# Patient Record
Sex: FEMALE | Race: WHITE | NOT HISPANIC OR LATINO
[De-identification: names, ages, dates, MRNs, and addresses within clinical notes are randomized per-mention and may not be internally consistent; named-entity substitution may affect disease eponyms.]

---

## 2017-01-30 ENCOUNTER — APPOINTMENT (OUTPATIENT)
Dept: CARDIOLOGY | Facility: CLINIC | Age: 67
End: 2017-01-30

## 2017-04-27 ENCOUNTER — APPOINTMENT (OUTPATIENT)
Dept: OTOLARYNGOLOGY | Facility: CLINIC | Age: 67
End: 2017-04-27

## 2017-07-19 ENCOUNTER — APPOINTMENT (OUTPATIENT)
Dept: CARDIOLOGY | Facility: CLINIC | Age: 67
End: 2017-07-19

## 2017-07-31 ENCOUNTER — EMERGENCY (EMERGENCY)
Facility: HOSPITAL | Age: 67
LOS: 0 days | Discharge: HOME | End: 2017-07-31
Admitting: FAMILY MEDICINE

## 2017-07-31 DIAGNOSIS — R19.7 DIARRHEA, UNSPECIFIED: ICD-10-CM

## 2017-07-31 DIAGNOSIS — E03.9 HYPOTHYROIDISM, UNSPECIFIED: ICD-10-CM

## 2017-07-31 DIAGNOSIS — N18.9 CHRONIC KIDNEY DISEASE, UNSPECIFIED: ICD-10-CM

## 2017-07-31 DIAGNOSIS — I12.9 HYPERTENSIVE CHRONIC KIDNEY DISEASE WITH STAGE 1 THROUGH STAGE 4 CHRONIC KIDNEY DISEASE, OR UNSPECIFIED CHRONIC KIDNEY DISEASE: ICD-10-CM

## 2017-07-31 DIAGNOSIS — R06.02 SHORTNESS OF BREATH: ICD-10-CM

## 2017-07-31 DIAGNOSIS — R53.1 WEAKNESS: ICD-10-CM

## 2017-07-31 DIAGNOSIS — N39.0 URINARY TRACT INFECTION, SITE NOT SPECIFIED: ICD-10-CM

## 2017-07-31 DIAGNOSIS — E11.9 TYPE 2 DIABETES MELLITUS WITHOUT COMPLICATIONS: ICD-10-CM

## 2017-07-31 DIAGNOSIS — Z79.899 OTHER LONG TERM (CURRENT) DRUG THERAPY: ICD-10-CM

## 2017-08-15 ENCOUNTER — APPOINTMENT (OUTPATIENT)
Dept: CARDIOLOGY | Facility: CLINIC | Age: 67
End: 2017-08-15

## 2017-08-15 VITALS — SYSTOLIC BLOOD PRESSURE: 120 MMHG | HEART RATE: 92 BPM | RESPIRATION RATE: 18 BRPM | DIASTOLIC BLOOD PRESSURE: 70 MMHG

## 2017-08-15 VITALS — HEIGHT: 63 IN

## 2017-08-15 DIAGNOSIS — I07.1 RHEUMATIC TRICUSPID INSUFFICIENCY: ICD-10-CM

## 2018-02-14 ENCOUNTER — APPOINTMENT (OUTPATIENT)
Dept: CARDIOLOGY | Facility: CLINIC | Age: 68
End: 2018-02-14

## 2018-02-14 VITALS — HEART RATE: 80 BPM | RESPIRATION RATE: 18 BRPM | SYSTOLIC BLOOD PRESSURE: 118 MMHG | DIASTOLIC BLOOD PRESSURE: 70 MMHG

## 2018-02-14 VITALS — HEIGHT: 63 IN

## 2018-08-20 ENCOUNTER — OUTPATIENT (OUTPATIENT)
Dept: OUTPATIENT SERVICES | Facility: HOSPITAL | Age: 68
LOS: 1 days | Discharge: HOME | End: 2018-08-20

## 2018-08-20 ENCOUNTER — LABORATORY RESULT (OUTPATIENT)
Age: 68
End: 2018-08-20

## 2018-08-20 ENCOUNTER — APPOINTMENT (OUTPATIENT)
Dept: OBGYN | Facility: CLINIC | Age: 68
End: 2018-08-20
Payer: MEDICARE

## 2018-08-20 VITALS — HEIGHT: 63 IN

## 2018-08-20 DIAGNOSIS — M85.80 OTHER SPECIFIED DISORDERS OF BONE DENSITY AND STRUCTURE, UNSPECIFIED SITE: ICD-10-CM

## 2018-08-20 PROCEDURE — 99387 INIT PM E/M NEW PAT 65+ YRS: CPT

## 2018-08-20 PROCEDURE — 81003 URINALYSIS AUTO W/O SCOPE: CPT | Mod: QW

## 2018-08-21 DIAGNOSIS — Z01.419 ENCOUNTER FOR GYNECOLOGICAL EXAMINATION (GENERAL) (ROUTINE) WITHOUT ABNORMAL FINDINGS: ICD-10-CM

## 2018-08-27 ENCOUNTER — APPOINTMENT (OUTPATIENT)
Dept: OTOLARYNGOLOGY | Facility: CLINIC | Age: 68
End: 2018-08-27

## 2018-09-07 LAB
BILIRUB UR QL STRIP: NORMAL
BILIRUB UR QL STRIP: NORMAL
CLARITY UR: CLEAR
GLUCOSE UR-MCNC: NORMAL
GLUCOSE UR-MCNC: NORMAL
HCG UR QL: NORMAL EU/DL
HCG UR QL: NORMAL EU/DL
HGB UR QL STRIP.AUTO: NORMAL
HGB UR QL STRIP.AUTO: NORMAL
KETONES UR-MCNC: NORMAL
KETONES UR-MCNC: NORMAL
LEUKOCYTE ESTERASE UR QL STRIP: 25
LEUKOCYTE ESTERASE UR QL STRIP: 25
NITRITE UR QL STRIP: NORMAL
NITRITE UR QL STRIP: NORMAL
PH UR STRIP: 7
PH UR STRIP: 7
PROT UR STRIP-MCNC: NORMAL
PROT UR STRIP-MCNC: NORMAL
SP GR UR STRIP: 1.01
SP GR UR STRIP: 1.01

## 2018-09-13 ENCOUNTER — APPOINTMENT (OUTPATIENT)
Dept: OBGYN | Facility: CLINIC | Age: 68
End: 2018-09-13
Payer: MEDICARE

## 2018-09-13 PROCEDURE — 76830 TRANSVAGINAL US NON-OB: CPT

## 2018-09-13 PROCEDURE — 76705 ECHO EXAM OF ABDOMEN: CPT

## 2018-09-13 PROCEDURE — 77085 DXA BONE DENSITY AXL VRT FX: CPT

## 2018-09-25 ENCOUNTER — APPOINTMENT (OUTPATIENT)
Dept: CARDIOLOGY | Facility: CLINIC | Age: 68
End: 2018-09-25

## 2018-09-25 VITALS — HEIGHT: 63 IN

## 2018-09-25 VITALS
RESPIRATION RATE: 18 BRPM | SYSTOLIC BLOOD PRESSURE: 136 MMHG | WEIGHT: 245 LBS | HEART RATE: 84 BPM | DIASTOLIC BLOOD PRESSURE: 80 MMHG | BODY MASS INDEX: 43.4 KG/M2

## 2018-10-12 ENCOUNTER — APPOINTMENT (OUTPATIENT)
Dept: OTOLARYNGOLOGY | Facility: CLINIC | Age: 68
End: 2018-10-12
Payer: MEDICARE

## 2018-10-12 VITALS
SYSTOLIC BLOOD PRESSURE: 130 MMHG | HEIGHT: 63 IN | DIASTOLIC BLOOD PRESSURE: 87 MMHG | BODY MASS INDEX: 43.41 KG/M2 | WEIGHT: 245 LBS

## 2018-10-12 DIAGNOSIS — H90.3 SENSORINEURAL HEARING LOSS, BILATERAL: ICD-10-CM

## 2018-10-12 DIAGNOSIS — H61.20 IMPACTED CERUMEN, UNSPECIFIED EAR: ICD-10-CM

## 2018-10-12 PROCEDURE — 92557 COMPREHENSIVE HEARING TEST: CPT

## 2018-10-12 PROCEDURE — 99203 OFFICE O/P NEW LOW 30 MIN: CPT | Mod: 25

## 2018-10-12 PROCEDURE — 92570 ACOUSTIC IMMITANCE TESTING: CPT

## 2019-02-26 ENCOUNTER — APPOINTMENT (OUTPATIENT)
Dept: CARDIOLOGY | Facility: CLINIC | Age: 69
End: 2019-02-26

## 2019-03-04 ENCOUNTER — OUTPATIENT (OUTPATIENT)
Dept: OUTPATIENT SERVICES | Facility: HOSPITAL | Age: 69
LOS: 1 days | Discharge: HOME | End: 2019-03-04

## 2019-03-04 DIAGNOSIS — E78.5 HYPERLIPIDEMIA, UNSPECIFIED: ICD-10-CM

## 2019-03-04 DIAGNOSIS — E13.9 OTHER SPECIFIED DIABETES MELLITUS WITHOUT COMPLICATIONS: ICD-10-CM

## 2019-03-04 DIAGNOSIS — D51.9 VITAMIN B12 DEFICIENCY ANEMIA, UNSPECIFIED: ICD-10-CM

## 2019-03-04 DIAGNOSIS — R74.8 ABNORMAL LEVELS OF OTHER SERUM ENZYMES: ICD-10-CM

## 2019-03-04 DIAGNOSIS — E55.9 VITAMIN D DEFICIENCY, UNSPECIFIED: ICD-10-CM

## 2019-03-04 DIAGNOSIS — R94.6 ABNORMAL RESULTS OF THYROID FUNCTION STUDIES: ICD-10-CM

## 2019-03-04 DIAGNOSIS — R53.82 CHRONIC FATIGUE, UNSPECIFIED: ICD-10-CM

## 2019-03-09 ENCOUNTER — OUTPATIENT (OUTPATIENT)
Dept: OUTPATIENT SERVICES | Facility: HOSPITAL | Age: 69
LOS: 1 days | Discharge: HOME | End: 2019-03-09

## 2019-03-09 DIAGNOSIS — R53.82 CHRONIC FATIGUE, UNSPECIFIED: ICD-10-CM

## 2019-04-23 ENCOUNTER — APPOINTMENT (OUTPATIENT)
Dept: CARDIOLOGY | Facility: CLINIC | Age: 69
End: 2019-04-23
Payer: MEDICARE

## 2019-04-23 VITALS — HEIGHT: 63 IN

## 2019-04-23 VITALS — SYSTOLIC BLOOD PRESSURE: 130 MMHG | HEART RATE: 84 BPM | DIASTOLIC BLOOD PRESSURE: 80 MMHG | RESPIRATION RATE: 18 BRPM

## 2019-04-23 PROCEDURE — 99214 OFFICE O/P EST MOD 30 MIN: CPT

## 2019-04-23 PROCEDURE — 93000 ELECTROCARDIOGRAM COMPLETE: CPT

## 2019-04-23 NOTE — PHYSICAL EXAM
[Normal Appearance] : normal appearance [General Appearance - Well Developed] : well developed [Well Groomed] : well groomed [General Appearance - Well Nourished] : well nourished [No Deformities] : no deformities [Normal Conjunctiva] : the conjunctiva exhibited no abnormalities [General Appearance - In No Acute Distress] : no acute distress [Eyelids - No Xanthelasma] : the eyelids demonstrated no xanthelasmas [Normal Oral Mucosa] : normal oral mucosa [No Oral Pallor] : no oral pallor [No Oral Cyanosis] : no oral cyanosis [Normal Jugular Venous V Waves Present] : normal jugular venous V waves present [Normal Jugular Venous A Waves Present] : normal jugular venous A waves present [No Jugular Venous Schumacher A Waves] : no jugular venous schumacher A waves [Respiration, Rhythm And Depth] : normal respiratory rhythm and effort [Exaggerated Use Of Accessory Muscles For Inspiration] : no accessory muscle use [Auscultation Breath Sounds / Voice Sounds] : lungs were clear to auscultation bilaterally [Heart Rate And Rhythm] : heart rate and rhythm were normal [Heart Sounds] : normal S1 and S2 [Systolic grade ___/6] : A grade [unfilled]/6 systolic murmur was heard. [Bowel Sounds] : normal bowel sounds [Abdomen Soft] : soft [Abdomen Tenderness] : non-tender [Abdomen Mass (___ Cm)] : no abdominal mass palpated [FreeTextEntry1] : walks with cane [Nail Clubbing] : no clubbing of the fingernails [Petechial Hemorrhages (___cm)] : no petechial hemorrhages [Cyanosis, Localized] : no localized cyanosis [Skin Color & Pigmentation] : normal skin color and pigmentation [] : no rash [No Venous Stasis] : no venous stasis [Skin Lesions] : no skin lesions [No Skin Ulcers] : no skin ulcer [No Xanthoma] : no  xanthoma was observed [Oriented To Time, Place, And Person] : oriented to person, place, and time

## 2019-10-02 ENCOUNTER — OUTPATIENT (OUTPATIENT)
Dept: OUTPATIENT SERVICES | Facility: HOSPITAL | Age: 69
LOS: 1 days | Discharge: HOME | End: 2019-10-02

## 2019-10-02 DIAGNOSIS — E78.5 HYPERLIPIDEMIA, UNSPECIFIED: ICD-10-CM

## 2019-10-02 DIAGNOSIS — E13.9 OTHER SPECIFIED DIABETES MELLITUS WITHOUT COMPLICATIONS: ICD-10-CM

## 2019-10-02 DIAGNOSIS — R53.82 CHRONIC FATIGUE, UNSPECIFIED: ICD-10-CM

## 2019-10-28 ENCOUNTER — OTHER (OUTPATIENT)
Age: 69
End: 2019-10-28

## 2019-10-28 ENCOUNTER — APPOINTMENT (OUTPATIENT)
Dept: CARDIOLOGY | Facility: CLINIC | Age: 69
End: 2019-10-28
Payer: MEDICARE

## 2019-10-28 VITALS — HEART RATE: 84 BPM | SYSTOLIC BLOOD PRESSURE: 130 MMHG | RESPIRATION RATE: 18 BRPM | DIASTOLIC BLOOD PRESSURE: 80 MMHG

## 2019-10-28 VITALS — HEIGHT: 63 IN

## 2019-10-28 PROCEDURE — 99214 OFFICE O/P EST MOD 30 MIN: CPT

## 2019-10-28 PROCEDURE — 93000 ELECTROCARDIOGRAM COMPLETE: CPT

## 2019-10-28 NOTE — PHYSICAL EXAM
[General Appearance - Well Developed] : well developed [Normal Appearance] : normal appearance [Well Groomed] : well groomed [General Appearance - Well Nourished] : well nourished [No Deformities] : no deformities [General Appearance - In No Acute Distress] : no acute distress [Normal Conjunctiva] : the conjunctiva exhibited no abnormalities [Eyelids - No Xanthelasma] : the eyelids demonstrated no xanthelasmas [Normal Oral Mucosa] : normal oral mucosa [No Oral Pallor] : no oral pallor [No Oral Cyanosis] : no oral cyanosis [Normal Jugular Venous A Waves Present] : normal jugular venous A waves present [Normal Jugular Venous V Waves Present] : normal jugular venous V waves present [No Jugular Venous Schumacher A Waves] : no jugular venous schumacher A waves [Heart Rate And Rhythm] : heart rate and rhythm were normal [Heart Sounds] : normal S1 and S2 [Systolic grade ___/6] : A grade [unfilled]/6 systolic murmur was heard. [Respiration, Rhythm And Depth] : normal respiratory rhythm and effort [Exaggerated Use Of Accessory Muscles For Inspiration] : no accessory muscle use [Auscultation Breath Sounds / Voice Sounds] : lungs were clear to auscultation bilaterally [Bowel Sounds] : normal bowel sounds [Abdomen Soft] : soft [Abdomen Tenderness] : non-tender [Abdomen Mass (___ Cm)] : no abdominal mass palpated [FreeTextEntry1] : walks with cane [Nail Clubbing] : no clubbing of the fingernails [Cyanosis, Localized] : no localized cyanosis [Petechial Hemorrhages (___cm)] : no petechial hemorrhages [Skin Color & Pigmentation] : normal skin color and pigmentation [] : no rash [No Venous Stasis] : no venous stasis [Skin Lesions] : no skin lesions [No Skin Ulcers] : no skin ulcer [No Xanthoma] : no  xanthoma was observed [Oriented To Time, Place, And Person] : oriented to person, place, and time

## 2020-04-21 ENCOUNTER — APPOINTMENT (OUTPATIENT)
Dept: CARDIOLOGY | Facility: CLINIC | Age: 70
End: 2020-04-21

## 2020-04-24 ENCOUNTER — APPOINTMENT (OUTPATIENT)
Dept: CARDIOLOGY | Facility: CLINIC | Age: 70
End: 2020-04-24

## 2021-07-25 ENCOUNTER — LABORATORY RESULT (OUTPATIENT)
Age: 71
End: 2021-07-25

## 2021-07-26 ENCOUNTER — NON-APPOINTMENT (OUTPATIENT)
Age: 71
End: 2021-07-26

## 2021-07-26 ENCOUNTER — APPOINTMENT (OUTPATIENT)
Dept: OBGYN | Facility: CLINIC | Age: 71
End: 2021-07-26
Payer: MEDICARE

## 2021-07-26 VITALS — HEIGHT: 62 IN | TEMPERATURE: 98 F

## 2021-07-26 DIAGNOSIS — M81.0 AGE-RELATED OSTEOPOROSIS W/OUT CURRENT PATHOLOGICAL FRACTURE: ICD-10-CM

## 2021-07-26 DIAGNOSIS — Z78.0 ASYMPTOMATIC MENOPAUSAL STATE: ICD-10-CM

## 2021-07-26 DIAGNOSIS — Z01.419 ENCOUNTER FOR GYNECOLOGICAL EXAMINATION (GENERAL) (ROUTINE) W/OUT ABNORMAL FINDINGS: ICD-10-CM

## 2021-07-26 DIAGNOSIS — N39.3 STRESS INCONTINENCE (FEMALE) (MALE): ICD-10-CM

## 2021-07-26 PROCEDURE — 77085 DXA BONE DENSITY AXL VRT FX: CPT

## 2021-07-26 PROCEDURE — 99397 PER PM REEVAL EST PAT 65+ YR: CPT

## 2021-07-26 PROCEDURE — 99072 ADDL SUPL MATRL&STAF TM PHE: CPT

## 2021-07-30 PROBLEM — N39.3 URINARY, INCONTINENCE, STRESS FEMALE: Status: ACTIVE | Noted: 2021-07-30

## 2021-07-30 PROBLEM — Z78.0 MENOPAUSE: Status: ACTIVE | Noted: 2018-08-20

## 2021-07-30 PROBLEM — M81.0 OSTEOPOROSIS, POSTMENOPAUSAL: Status: ACTIVE | Noted: 2021-07-30

## 2021-07-30 PROBLEM — Z01.419 WELL WOMAN EXAM WITH ROUTINE GYNECOLOGICAL EXAM: Status: ACTIVE | Noted: 2018-08-20

## 2021-08-06 ENCOUNTER — APPOINTMENT (OUTPATIENT)
Dept: OTOLARYNGOLOGY | Facility: CLINIC | Age: 71
End: 2021-08-06

## 2021-08-12 ENCOUNTER — NON-APPOINTMENT (OUTPATIENT)
Age: 71
End: 2021-08-12

## 2021-08-12 ENCOUNTER — APPOINTMENT (OUTPATIENT)
Dept: OBGYN | Facility: CLINIC | Age: 71
End: 2021-08-12

## 2021-09-13 ENCOUNTER — APPOINTMENT (OUTPATIENT)
Dept: CARDIOLOGY | Facility: CLINIC | Age: 71
End: 2021-09-13
Payer: MEDICARE

## 2021-09-13 PROCEDURE — 93306 TTE W/DOPPLER COMPLETE: CPT

## 2021-09-20 ENCOUNTER — APPOINTMENT (OUTPATIENT)
Dept: CARDIOLOGY | Facility: CLINIC | Age: 71
End: 2021-09-20
Payer: MEDICARE

## 2021-09-20 VITALS — SYSTOLIC BLOOD PRESSURE: 130 MMHG | DIASTOLIC BLOOD PRESSURE: 80 MMHG | HEART RATE: 84 BPM | RESPIRATION RATE: 18 BRPM

## 2021-09-20 PROCEDURE — 99214 OFFICE O/P EST MOD 30 MIN: CPT

## 2021-09-20 PROCEDURE — 93000 ELECTROCARDIOGRAM COMPLETE: CPT

## 2021-09-20 RX ORDER — ACETAMINOPHEN AND CODEINE 300; 30 MG/1; MG/1
300-30 TABLET ORAL
Qty: 12 | Refills: 0 | Status: ACTIVE | COMMUNITY
Start: 2021-09-09

## 2021-09-20 RX ORDER — ROSUVASTATIN CALCIUM 10 MG/1
10 TABLET, FILM COATED ORAL DAILY
Qty: 30 | Refills: 0 | Status: DISCONTINUED | COMMUNITY
Start: 2019-04-30 | End: 2021-09-20

## 2021-09-20 RX ORDER — ROSUVASTATIN CALCIUM 20 MG/1
20 TABLET, FILM COATED ORAL
Qty: 90 | Refills: 0 | Status: ACTIVE | COMMUNITY
Start: 2020-12-29

## 2021-09-20 NOTE — PHYSICAL EXAM
[General Appearance - Well Developed] : well developed [Normal Appearance] : normal appearance [Well Groomed] : well groomed [No Deformities] : no deformities [General Appearance - Well Nourished] : well nourished [General Appearance - In No Acute Distress] : no acute distress [Normal Conjunctiva] : the conjunctiva exhibited no abnormalities [Eyelids - No Xanthelasma] : the eyelids demonstrated no xanthelasmas [Normal Oral Mucosa] : normal oral mucosa [No Oral Pallor] : no oral pallor [No Oral Cyanosis] : no oral cyanosis [Normal Jugular Venous A Waves Present] : normal jugular venous A waves present [Normal Jugular Venous V Waves Present] : normal jugular venous V waves present [No Jugular Venous Schumacher A Waves] : no jugular venous schmuacher A waves [Heart Rate And Rhythm] : heart rate and rhythm were normal [Heart Sounds] : normal S1 and S2 [Systolic grade ___/6] : A grade [unfilled]/6 systolic murmur was heard. [Respiration, Rhythm And Depth] : normal respiratory rhythm and effort [Exaggerated Use Of Accessory Muscles For Inspiration] : no accessory muscle use [Auscultation Breath Sounds / Voice Sounds] : lungs were clear to auscultation bilaterally [Bowel Sounds] : normal bowel sounds [Abdomen Soft] : soft [Abdomen Tenderness] : non-tender [Abdomen Mass (___ Cm)] : no abdominal mass palpated [FreeTextEntry1] : walks with cane [Nail Clubbing] : no clubbing of the fingernails [Cyanosis, Localized] : no localized cyanosis [Petechial Hemorrhages (___cm)] : no petechial hemorrhages [Skin Color & Pigmentation] : normal skin color and pigmentation [] : no rash [No Venous Stasis] : no venous stasis [Skin Lesions] : no skin lesions [No Skin Ulcers] : no skin ulcer [No Xanthoma] : no  xanthoma was observed [Oriented To Time, Place, And Person] : oriented to person, place, and time

## 2022-05-03 ENCOUNTER — APPOINTMENT (OUTPATIENT)
Dept: CARDIOLOGY | Facility: CLINIC | Age: 72
End: 2022-05-03
Payer: MEDICARE

## 2022-05-03 VITALS — DIASTOLIC BLOOD PRESSURE: 80 MMHG | RESPIRATION RATE: 18 BRPM | SYSTOLIC BLOOD PRESSURE: 120 MMHG | HEART RATE: 76 BPM

## 2022-05-03 PROCEDURE — 93000 ELECTROCARDIOGRAM COMPLETE: CPT

## 2022-05-03 PROCEDURE — 99214 OFFICE O/P EST MOD 30 MIN: CPT

## 2022-05-04 ENCOUNTER — APPOINTMENT (OUTPATIENT)
Age: 72
End: 2022-05-04
Payer: MEDICARE

## 2022-05-17 ENCOUNTER — APPOINTMENT (OUTPATIENT)
Age: 72
End: 2022-05-17
Payer: MEDICARE

## 2022-05-17 VITALS
OXYGEN SATURATION: 93 % | HEIGHT: 62 IN | DIASTOLIC BLOOD PRESSURE: 72 MMHG | RESPIRATION RATE: 14 BRPM | SYSTOLIC BLOOD PRESSURE: 114 MMHG | HEART RATE: 88 BPM

## 2022-05-17 PROCEDURE — 99214 OFFICE O/P EST MOD 30 MIN: CPT

## 2022-05-17 NOTE — REASON FOR VISIT
[Follow-Up] : a follow-up visit [Sleep Apnea] : sleep apnea [Obesity] : obesity [TextBox_44] : The patient underwent sleep testing in December 2019.  The test showed significant obstructive sleep apnea syndrome.  CPAP machine was ordered.  However the patient never got the machine.  This was right around around the start of the COVID pandemic.  Since that time she has been lost to follow-up\par \par The patient returns today with signs and symptoms of ongoing disease.  She wishes to acquire the CPAP at this.  I am the test may be too old and that Medicare may not approve.  She does not want to repeat any testing.  I told her I would try to put it through based on the old test hoping that Medicare will approve the equipment.  If they refuse stating that the test is too low she will need to have at least a home sleep test to redocument the presence of sleep apnea.

## 2022-05-17 NOTE — ASSESSMENT
[FreeTextEntry1] : Assessment:\par CORTEZ\par Obesity\par \par PLAN:\par The patient  needs the PAP device .\par New supplies ordered \par Weight loss discussed\par I stressed the need maintain compliance  with the PAP device.\par The patient is not to use an Ozone or UV sterilizer. \par F/U in 3months \par if the insurance refuses she will need to have a home sleep test to review document the disease.\par \par \par

## 2022-11-08 ENCOUNTER — APPOINTMENT (OUTPATIENT)
Dept: CARDIOLOGY | Facility: CLINIC | Age: 72
End: 2022-11-08

## 2022-11-08 VITALS — HEART RATE: 85 BPM | HEIGHT: 62 IN

## 2022-11-08 VITALS — SYSTOLIC BLOOD PRESSURE: 118 MMHG | RESPIRATION RATE: 18 BRPM | DIASTOLIC BLOOD PRESSURE: 80 MMHG

## 2022-11-08 PROCEDURE — 93000 ELECTROCARDIOGRAM COMPLETE: CPT

## 2022-11-08 PROCEDURE — 99214 OFFICE O/P EST MOD 30 MIN: CPT | Mod: 25

## 2022-11-08 RX ORDER — INSULIN LISPRO 100 [IU]/ML
(75-25) 100 INJECTION, SUSPENSION SUBCUTANEOUS
Qty: 210 | Refills: 0 | Status: ACTIVE | COMMUNITY
Start: 2022-07-11

## 2022-12-20 ENCOUNTER — NON-APPOINTMENT (OUTPATIENT)
Age: 72
End: 2022-12-20

## 2022-12-21 ENCOUNTER — EMERGENCY (EMERGENCY)
Facility: HOSPITAL | Age: 72
LOS: 0 days | Discharge: HOME | End: 2022-12-21
Attending: EMERGENCY MEDICINE | Admitting: EMERGENCY MEDICINE

## 2022-12-21 VITALS
OXYGEN SATURATION: 99 % | HEART RATE: 92 BPM | DIASTOLIC BLOOD PRESSURE: 56 MMHG | RESPIRATION RATE: 20 BRPM | TEMPERATURE: 98 F | SYSTOLIC BLOOD PRESSURE: 115 MMHG

## 2022-12-21 VITALS
SYSTOLIC BLOOD PRESSURE: 147 MMHG | OXYGEN SATURATION: 100 % | RESPIRATION RATE: 18 BRPM | HEART RATE: 78 BPM | TEMPERATURE: 98 F | WEIGHT: 225.09 LBS | DIASTOLIC BLOOD PRESSURE: 50 MMHG

## 2022-12-21 DIAGNOSIS — R03.1 NONSPECIFIC LOW BLOOD-PRESSURE READING: ICD-10-CM

## 2022-12-21 DIAGNOSIS — R19.7 DIARRHEA, UNSPECIFIED: ICD-10-CM

## 2022-12-21 LAB
ALBUMIN SERPL ELPH-MCNC: 3.9 G/DL — SIGNIFICANT CHANGE UP (ref 3.5–5.2)
ALP SERPL-CCNC: 142 U/L — HIGH (ref 30–115)
ALT FLD-CCNC: 28 U/L — SIGNIFICANT CHANGE UP (ref 0–41)
ANION GAP SERPL CALC-SCNC: 15 MMOL/L — HIGH (ref 7–14)
AST SERPL-CCNC: 34 U/L — SIGNIFICANT CHANGE UP (ref 0–41)
BILIRUB SERPL-MCNC: 0.5 MG/DL — SIGNIFICANT CHANGE UP (ref 0.2–1.2)
BUN SERPL-MCNC: 48 MG/DL — HIGH (ref 10–20)
CALCIUM SERPL-MCNC: 9.1 MG/DL — SIGNIFICANT CHANGE UP (ref 8.4–10.5)
CHLORIDE SERPL-SCNC: 99 MMOL/L — SIGNIFICANT CHANGE UP (ref 98–110)
CO2 SERPL-SCNC: 22 MMOL/L — SIGNIFICANT CHANGE UP (ref 17–32)
CREAT SERPL-MCNC: 3.4 MG/DL — HIGH (ref 0.7–1.5)
EGFR: 14 ML/MIN/1.73M2 — LOW
GLUCOSE SERPL-MCNC: 140 MG/DL — HIGH (ref 70–99)
HCT VFR BLD CALC: 32 % — LOW (ref 37–47)
HGB BLD-MCNC: 11.5 G/DL — LOW (ref 12–16)
LIDOCAIN IGE QN: 30 U/L — SIGNIFICANT CHANGE UP (ref 7–60)
MCHC RBC-ENTMCNC: 35.9 G/DL — SIGNIFICANT CHANGE UP (ref 32–37)
MCHC RBC-ENTMCNC: 38.2 PG — HIGH (ref 27–31)
MCV RBC AUTO: 106.3 FL — HIGH (ref 81–99)
NRBC # BLD: 0 /100 WBCS — SIGNIFICANT CHANGE UP (ref 0–0)
PLATELET # BLD AUTO: 195 K/UL — SIGNIFICANT CHANGE UP (ref 130–400)
POTASSIUM SERPL-MCNC: 4.2 MMOL/L — SIGNIFICANT CHANGE UP (ref 3.5–5)
POTASSIUM SERPL-SCNC: 4.2 MMOL/L — SIGNIFICANT CHANGE UP (ref 3.5–5)
PROT SERPL-MCNC: 6.3 G/DL — SIGNIFICANT CHANGE UP (ref 6–8)
RBC # BLD: 3.01 M/UL — LOW (ref 4.2–5.4)
RBC # FLD: 13.2 % — SIGNIFICANT CHANGE UP (ref 11.5–14.5)
SODIUM SERPL-SCNC: 136 MMOL/L — SIGNIFICANT CHANGE UP (ref 135–146)
WBC # BLD: 5.81 K/UL — SIGNIFICANT CHANGE UP (ref 4.8–10.8)
WBC # FLD AUTO: 5.81 K/UL — SIGNIFICANT CHANGE UP (ref 4.8–10.8)

## 2022-12-21 PROCEDURE — 99284 EMERGENCY DEPT VISIT MOD MDM: CPT

## 2022-12-21 RX ORDER — SODIUM CHLORIDE 9 MG/ML
1000 INJECTION INTRAMUSCULAR; INTRAVENOUS; SUBCUTANEOUS ONCE
Refills: 0 | Status: COMPLETED | OUTPATIENT
Start: 2022-12-21 | End: 2022-12-21

## 2022-12-21 RX ADMIN — SODIUM CHLORIDE 2000 MILLILITER(S): 9 INJECTION INTRAMUSCULAR; INTRAVENOUS; SUBCUTANEOUS at 20:57

## 2022-12-21 NOTE — ED PROVIDER NOTE - PATIENT PORTAL LINK FT
You can access the FollowMyHealth Patient Portal offered by [facility] by registering at the following website: [email or web address]. By joining [facility]’s FollowMyHealth portal, you will also be able to view your health information using other applications (apps) compatible with our system.

## 2022-12-21 NOTE — ED PROVIDER NOTE - OBJECTIVE STATEMENT
72-year-old female to ED with low blood pressure.  Patient has few days of diarrhea was seen at urgent care today noted to have blood pressure of 70 systolic and sent to ED for evaluation.  Patient given IV fluids symptoms much improved on route to ED and on arrival blood pressure 147/50.  No other complaints no headache or back pain.  No fevers no sick contacts no cough congestion URI.  Questionable flu exposure

## 2022-12-21 NOTE — ED ADULT TRIAGE NOTE - CHIEF COMPLAINT QUOTE
Patient complaining of weakness, nausea, and diarrhea x1 week- Patient was at  and was found to be hypotensive.(70/40)

## 2022-12-21 NOTE — ED PEDIATRIC NURSE NOTE - CHIEF COMPLAINT QUOTE
Pt came from Urgent care Center due to diarrhea. Pt c/o watery stools. [place] [facility] [place]

## 2022-12-21 NOTE — ED PROVIDER NOTE - NSFOLLOWUPINSTRUCTIONS_ED_ALL_ED_FT
Diarrhea    Diarrhea is frequent loose and watery bowel movements that has many causes. Diarrhea can make you feel weak and cause you to become dehydrated. Diarrhea typically lasts 3-5 days. However, it can last longer if it is a sign of something more serious. Drink clear fluids to prevent dehydration. Eat bland, easy-to-digest foods as tolerated.     SEEK IMMEDIATE MEDICAL CARE IF YOU HAVE THE FOLLOWING SYMPTOMS: fever, lightheadedness/dizziness, chest pain, black or bloody stools, shortness of breath, severe abdominal or back pain, or any signs of dehydration.

## 2022-12-21 NOTE — ED PROVIDER NOTE - NS ED ROS FT
ROS  Constitutional:  See HPI.  Eyes:  No visual changes, eye pain or discharge.  ENMT:  No hearing changes, pain, discharge or infections. No neck pain or stiffness.  Cardiac:  No chest pain, SOB or edema. No chest pain with exertion.  Respiratory:  No cough or respiratory distress. No hemoptysis.  GI:    diarrhea    :  No dysuria, frequency or burning.  MS:  No myalgia, muscle weakness, joint pain or back pain.  Neuro:  No focal neurological complaints.  Skin:  No skin rash.  Except as documented in the HPI,  all other systems are negative.

## 2022-12-21 NOTE — ED PROVIDER NOTE - CLINICAL SUMMARY MEDICAL DECISION MAKING FREE TEXT BOX
plant to check labs, give IVF and reassess plant to check labs, give IVF and reassess      Patient feeling much much better.  No longer dizziness or weakness.  Discussed further plan she is adamant about going home and prefers to sleep in her own bed.  Will follow-up with Dr. Kinsey for her kidney function.  Baseline creatinine 3.2.

## 2023-02-08 ENCOUNTER — NON-APPOINTMENT (OUTPATIENT)
Age: 73
End: 2023-02-08

## 2023-02-10 ENCOUNTER — APPOINTMENT (OUTPATIENT)
Dept: ORTHOPEDIC SURGERY | Facility: CLINIC | Age: 73
End: 2023-02-10
Payer: MEDICARE

## 2023-02-10 VITALS — HEIGHT: 63 IN | WEIGHT: 223 LBS | BODY MASS INDEX: 39.51 KG/M2

## 2023-02-10 DIAGNOSIS — S61.218A LACERATION W/OUT FOREIGN BODY OF OTHER FINGER W/OUT DAMAGE TO NAIL, INITIAL ENCOUNTER: ICD-10-CM

## 2023-02-10 PROCEDURE — 99202 OFFICE O/P NEW SF 15 MIN: CPT | Mod: 25

## 2023-02-10 PROCEDURE — 20550 NJX 1 TENDON SHEATH/LIGAMENT: CPT | Mod: F3

## 2023-02-10 NOTE — ASSESSMENT
[FreeTextEntry1] : Patient is a right middle finger laceration.  L plates not involved.  She will do Vaseline dressing changes.  I instructed her how to do this did watch a video and see a handout regarding the same.\par \par For her left ring finger she was here for cortisone injection to left ring finger A1 pulley area.  I read this well.  She make arrangements to see us back in a month for potential second injection.

## 2023-02-10 NOTE — HISTORY OF PRESENT ILLNESS
[de-identified] : 72-year-old female comes in the office for evaluation as her left hand bothers her with locking pain discomfort in the left ring finger she also sustained a laceration with a mandolin slicer to her right middle finger she comes in without bandage as well.

## 2023-02-10 NOTE — PHYSICAL EXAM
[de-identified] : The bandages removed.  She did lose skin at the tip of the distal phalanx.  There is good range of motion to the fingers no redness or drainage.  The nail plate is intact.\par \par She has tenderness to palpation of the left ring finger A1 pulley area there is triggering present there is normal sensation normal cap refill erythema ecchymoses or abrasions present.  No Dupuytren's

## 2023-02-13 ENCOUNTER — APPOINTMENT (OUTPATIENT)
Dept: PODIATRY | Facility: CLINIC | Age: 73
End: 2023-02-13
Payer: MEDICARE

## 2023-02-13 ENCOUNTER — RESULT REVIEW (OUTPATIENT)
Age: 73
End: 2023-02-13

## 2023-02-13 VITALS
SYSTOLIC BLOOD PRESSURE: 160 MMHG | HEIGHT: 63 IN | BODY MASS INDEX: 39.87 KG/M2 | HEART RATE: 95 BPM | WEIGHT: 225 LBS | DIASTOLIC BLOOD PRESSURE: 80 MMHG | OXYGEN SATURATION: 97 % | TEMPERATURE: 97 F

## 2023-02-13 PROCEDURE — 99203 OFFICE O/P NEW LOW 30 MIN: CPT

## 2023-02-22 ENCOUNTER — OUTPATIENT (OUTPATIENT)
Dept: OUTPATIENT SERVICES | Facility: HOSPITAL | Age: 73
LOS: 1 days | End: 2023-02-22
Payer: MEDICARE

## 2023-02-22 DIAGNOSIS — E11.610 TYPE 2 DIABETES MELLITUS WITH DIABETIC NEUROPATHIC ARTHROPATHY: ICD-10-CM

## 2023-02-22 PROCEDURE — 73630 X-RAY EXAM OF FOOT: CPT | Mod: 50

## 2023-02-22 PROCEDURE — 73630 X-RAY EXAM OF FOOT: CPT | Mod: 26,50

## 2023-02-23 DIAGNOSIS — E11.610 TYPE 2 DIABETES MELLITUS WITH DIABETIC NEUROPATHIC ARTHROPATHY: ICD-10-CM

## 2023-02-27 ENCOUNTER — APPOINTMENT (OUTPATIENT)
Dept: PODIATRY | Facility: CLINIC | Age: 73
End: 2023-02-27
Payer: MEDICARE

## 2023-02-27 PROCEDURE — 99213 OFFICE O/P EST LOW 20 MIN: CPT

## 2023-03-01 NOTE — PROCEDURE
[FreeTextEntry1] : Discussed x-rays in detail patient made aware of condition the condition is not Charcot foot deformity to the as per radiologist right foot appears to be degenerative osteoarthritis discussed in detail with patient in total will make appointment with orthotist in clinic

## 2023-03-01 NOTE — PHYSICAL EXAM
[General Appearance - Alert] : alert [General Appearance - In No Acute Distress] : in no acute distress [Ankle Swelling (On Exam)] : not present [Varicose Veins Of Lower Extremities] : not present [Delayed in the Right Toes] : capillary refills normal in right toes [Delayed in the Left Toes] : capillary refills normal in the left toes [2+] : left foot dorsalis pedis 2+ [No Joint Swelling] : no joint swelling [Normal Foot/Ankle] : Both lower extremities were exposed and visualized. Standing exam demonstrates normal foot posture and alignment. Hindfoot exam shows no hindfoot valgus or varus [Skin Color & Pigmentation] : normal skin color and pigmentation [Skin Turgor] : normal skin turgor [] : no rash [Skin Lesions] : no skin lesions [Foot Ulcer] : no foot ulcer [Skin Induration] : no skin induration [Vibration Dec.] : diminished vibratory sensation at the level of the toes [Position Sense Dec.] : diminished position sense at the level of the toes [Diminished Throughout Right Foot] : diminished sensation with monofilament testing throughout right foot [Diminished Throughout Left Foot] : diminished sensation with monofilament testing throughout left foot [Oriented To Time, Place, And Person] : oriented to person, place, and time [Impaired Insight] : insight and judgment were intact [Affect] : the affect was normal

## 2023-03-01 NOTE — HISTORY OF PRESENT ILLNESS
[FreeTextEntry1] : This is a patient that is seen for podiatric care\par \par Patient complains of bilateral collapsed arches and flatfeet\par \par Patient is diabetic and has a history of neuropathy\par \par Denies nausea vomiting fevers chills no shortness of breath

## 2023-03-01 NOTE — PROCEDURE
[FreeTextEntry1] : Patient evaluated history reviewed\par Discussed possible Charcot foot right being worse than the left\par Will obtain x-rays to evaluate possible collapsed right midfoot\par Discussed osteoarthritis and Charcot in detail\par Patient will follow-up in 2 weeks\par

## 2023-03-10 ENCOUNTER — APPOINTMENT (OUTPATIENT)
Dept: ORTHOPEDIC SURGERY | Facility: CLINIC | Age: 73
End: 2023-03-10
Payer: MEDICARE

## 2023-03-10 VITALS — BODY MASS INDEX: 39.87 KG/M2 | WEIGHT: 225 LBS | HEIGHT: 63 IN

## 2023-03-10 PROCEDURE — 20550 NJX 1 TENDON SHEATH/LIGAMENT: CPT

## 2023-03-10 NOTE — ASSESSMENT
[FreeTextEntry1] : Patient is a left ring finger trigger digit patient will receive her second cortisone injection today injection does not work she will consider surgical intervention for trigger finger release under local anesthesia.

## 2023-03-10 NOTE — PHYSICAL EXAM
[de-identified] : Very minimal wound on the right middle finger there is triggering and tenderness to palpation the A1 pulley left ring finger there is normal sensation normal cap refill there is no erythema ecchymoses or abrasions no Dupuytren's contracture.

## 2023-03-10 NOTE — HISTORY OF PRESENT ILLNESS
[de-identified] : 72-year-old female had a wound to the right middle finger which is healing nicely left ring finger had a cortisone injection for for trigger digit that did well also with improvement in symptoms of locking.

## 2023-03-16 ENCOUNTER — APPOINTMENT (OUTPATIENT)
Dept: OBGYN | Facility: CLINIC | Age: 73
End: 2023-03-16

## 2023-04-04 ENCOUNTER — APPOINTMENT (OUTPATIENT)
Dept: PODIATRY | Facility: CLINIC | Age: 73
End: 2023-04-04
Payer: MEDICARE

## 2023-04-04 ENCOUNTER — OUTPATIENT (OUTPATIENT)
Dept: OUTPATIENT SERVICES | Facility: HOSPITAL | Age: 73
LOS: 1 days | End: 2023-04-04
Payer: MEDICARE

## 2023-04-04 DIAGNOSIS — Z00.00 ENCOUNTER FOR GENERAL ADULT MEDICAL EXAMINATION WITHOUT ABNORMAL FINDINGS: ICD-10-CM

## 2023-04-04 PROCEDURE — 99213 OFFICE O/P EST LOW 20 MIN: CPT

## 2023-04-05 NOTE — PROCEDURE
[] : A mold was applied. [FreeTextEntry1] : pt with bilateral flat feet. initiated fabrication of orthotics with pink and white plastizote. pt will be getting new shoes will return in several weeks with shoes,will complete fabrication of inserts at that time.

## 2023-04-14 DIAGNOSIS — E11.610 TYPE 2 DIABETES MELLITUS WITH DIABETIC NEUROPATHIC ARTHROPATHY: ICD-10-CM

## 2023-04-14 DIAGNOSIS — M79.672 PAIN IN LEFT FOOT: ICD-10-CM

## 2023-04-14 DIAGNOSIS — R26.2 DIFFICULTY IN WALKING, NOT ELSEWHERE CLASSIFIED: ICD-10-CM

## 2023-04-14 DIAGNOSIS — M79.671 PAIN IN RIGHT FOOT: ICD-10-CM

## 2023-04-24 ENCOUNTER — APPOINTMENT (OUTPATIENT)
Dept: PODIATRY | Facility: CLINIC | Age: 73
End: 2023-04-24
Payer: MEDICARE

## 2023-04-24 ENCOUNTER — OUTPATIENT (OUTPATIENT)
Dept: OUTPATIENT SERVICES | Facility: HOSPITAL | Age: 73
LOS: 1 days | End: 2023-04-24
Payer: MEDICARE

## 2023-04-24 DIAGNOSIS — M79.672 PAIN IN LEFT FOOT: ICD-10-CM

## 2023-04-24 DIAGNOSIS — Z00.00 ENCOUNTER FOR GENERAL ADULT MEDICAL EXAMINATION WITHOUT ABNORMAL FINDINGS: ICD-10-CM

## 2023-04-24 DIAGNOSIS — M79.671 PAIN IN RIGHT FOOT: ICD-10-CM

## 2023-04-24 DIAGNOSIS — E11.610 TYPE 2 DIABETES MELLITUS WITH DIABETIC NEUROPATHIC ARTHROPATHY: ICD-10-CM

## 2023-04-24 PROCEDURE — 99213 OFFICE O/P EST LOW 20 MIN: CPT

## 2023-05-03 NOTE — PROCEDURE
[FreeTextEntry1] : pt with new shoes. began fabrication of orthotics. with orthotics shoes proved to be too small. pt will exchange for 1/2 size bigger and return here in 3 weeks for completion of orthotics.

## 2023-05-23 NOTE — ASU PATIENT PROFILE, ADULT - FALL HARM RISK - UNIVERSAL INTERVENTIONS
Bed in lowest position, wheels locked, appropriate side rails in place/Call bell, personal items and telephone in reach/Instruct patient to call for assistance before getting out of bed or chair/Non-slip footwear when patient is out of bed/[place]cker to call system/Physically safe environment - no spills, clutter or unnecessary equipment/Purposeful Proactive Rounding/Room/bathroom lighting operational, light cord in reach

## 2023-05-24 ENCOUNTER — APPOINTMENT (OUTPATIENT)
Dept: ORTHOPEDIC SURGERY | Facility: AMBULATORY SURGERY CENTER | Age: 73
End: 2023-05-24

## 2023-05-24 ENCOUNTER — TRANSCRIPTION ENCOUNTER (OUTPATIENT)
Age: 73
End: 2023-05-24

## 2023-05-24 ENCOUNTER — OUTPATIENT (OUTPATIENT)
Dept: INPATIENT UNIT | Facility: HOSPITAL | Age: 73
LOS: 1 days | Discharge: HOME | End: 2023-05-24
Payer: MEDICARE

## 2023-05-24 VITALS
DIASTOLIC BLOOD PRESSURE: 63 MMHG | RESPIRATION RATE: 23 BRPM | HEART RATE: 72 BPM | SYSTOLIC BLOOD PRESSURE: 133 MMHG | TEMPERATURE: 98 F | HEIGHT: 65 IN | OXYGEN SATURATION: 99 % | WEIGHT: 220.02 LBS

## 2023-05-24 VITALS
SYSTOLIC BLOOD PRESSURE: 132 MMHG | OXYGEN SATURATION: 98 % | DIASTOLIC BLOOD PRESSURE: 63 MMHG | HEART RATE: 77 BPM | RESPIRATION RATE: 18 BRPM

## 2023-05-24 DIAGNOSIS — Z96.659 PRESENCE OF UNSPECIFIED ARTIFICIAL KNEE JOINT: Chronic | ICD-10-CM

## 2023-05-24 DIAGNOSIS — M65.342 TRIGGER FINGER, LEFT RING FINGER: ICD-10-CM

## 2023-05-24 PROCEDURE — 26055 INCISE FINGER TENDON SHEATH: CPT | Mod: F3

## 2023-05-24 RX ORDER — BUPROPION HYDROCHLORIDE 150 MG/1
1 TABLET, EXTENDED RELEASE ORAL
Refills: 0 | DISCHARGE

## 2023-05-24 RX ORDER — ROSUVASTATIN CALCIUM 5 MG/1
1 TABLET ORAL
Refills: 0 | DISCHARGE

## 2023-05-24 RX ORDER — GABAPENTIN 400 MG/1
1 CAPSULE ORAL
Refills: 0 | DISCHARGE

## 2023-05-24 RX ORDER — LEVOTHYROXINE SODIUM 125 MCG
1 TABLET ORAL
Refills: 0 | DISCHARGE

## 2023-05-24 RX ORDER — DULOXETINE HYDROCHLORIDE 30 MG/1
1 CAPSULE, DELAYED RELEASE ORAL
Refills: 0 | DISCHARGE

## 2023-05-24 RX ORDER — CLOPIDOGREL BISULFATE 75 MG/1
1 TABLET, FILM COATED ORAL
Refills: 0 | DISCHARGE

## 2023-05-24 RX ORDER — FINASTERIDE 5 MG/1
1 TABLET, FILM COATED ORAL
Refills: 0 | DISCHARGE

## 2023-05-24 NOTE — ASU DISCHARGE PLAN (ADULT/PEDIATRIC) - ASU DC SPECIAL INSTRUCTIONSFT

## 2023-05-24 NOTE — ASU DISCHARGE PLAN (ADULT/PEDIATRIC) - NS MD DC FALL RISK RISK
For information on Fall & Injury Prevention, visit: [email or web address] OR  [email or web address] OR  https://www.cdc.gov/steadi/patient.html

## 2023-05-26 DIAGNOSIS — M65.342 TRIGGER FINGER, LEFT RING FINGER: ICD-10-CM

## 2023-06-01 ENCOUNTER — TRANSCRIPTION ENCOUNTER (OUTPATIENT)
Age: 73
End: 2023-06-01

## 2023-06-01 ENCOUNTER — APPOINTMENT (OUTPATIENT)
Dept: ORTHOPEDIC SURGERY | Facility: CLINIC | Age: 73
End: 2023-06-01
Payer: MEDICARE

## 2023-06-01 ENCOUNTER — APPOINTMENT (OUTPATIENT)
Dept: PULMONOLOGY | Facility: CLINIC | Age: 73
End: 2023-06-01
Payer: MEDICARE

## 2023-06-01 VITALS
BODY MASS INDEX: 38.98 KG/M2 | WEIGHT: 220 LBS | SYSTOLIC BLOOD PRESSURE: 124 MMHG | HEIGHT: 63 IN | DIASTOLIC BLOOD PRESSURE: 70 MMHG | OXYGEN SATURATION: 97 % | RESPIRATION RATE: 14 BRPM | HEART RATE: 86 BPM

## 2023-06-01 PROBLEM — I10 ESSENTIAL (PRIMARY) HYPERTENSION: Chronic | Status: ACTIVE | Noted: 2023-05-24

## 2023-06-01 PROBLEM — M19.90 UNSPECIFIED OSTEOARTHRITIS, UNSPECIFIED SITE: Chronic | Status: ACTIVE | Noted: 2023-05-24

## 2023-06-01 PROBLEM — E03.9 HYPOTHYROIDISM, UNSPECIFIED: Chronic | Status: ACTIVE | Noted: 2023-05-24

## 2023-06-01 PROBLEM — Z86.73 PERSONAL HISTORY OF TRANSIENT ISCHEMIC ATTACK (TIA), AND CEREBRAL INFARCTION WITHOUT RESIDUAL DEFICITS: Chronic | Status: ACTIVE | Noted: 2023-05-24

## 2023-06-01 PROCEDURE — 99024 POSTOP FOLLOW-UP VISIT: CPT

## 2023-06-01 PROCEDURE — 99212 OFFICE O/P EST SF 10 MIN: CPT

## 2023-06-01 NOTE — DISCUSSION/SUMMARY
[de-identified] : At this time the sutures were removed, patient tolerated this well.\par At this point continue working on range of motion and scar massage, we will see them back as needed. \par Patient will call me if any other problems or concerns.  Patient verbalized understanding and agreed with the plan, all questions were answered in the office today.\par

## 2023-06-01 NOTE — HISTORY OF PRESENT ILLNESS
[de-identified] : 72-year-old female is here today for her first postop appointment status post left fourth trigger finger release 5/24/2023 with Dr. Ramirez.  She is doing well.

## 2023-06-01 NOTE — REASON FOR VISIT
[Follow-Up] : a follow-up visit [TextBox_44] : Patient comes to office arguing with both front office and back office staff.  She was offered a spot for an appointment today she declined it but showed up anyway saying that she booked the appointment.  It is unclear where the misunderstanding arose.  She is stating that her machine is broken and that a prescription is needed to send to the vendor to evaluate the machine.  This was sent yesterday after her phone call. [Sleep Apnea] : sleep apnea

## 2023-06-01 NOTE — ASSESSMENT
[FreeTextEntry1] : It is unclear where the misunderstanding arose\par She needs to have her CPAP unit evaluated by the vendor.  If truly broken beyond repair it needs to be replaced.\par This was discussed with the patient in detail.\par She remained aggravated throughout the entire visit.

## 2023-06-01 NOTE — PHYSICAL EXAM
[de-identified] : On examination of her left hand there is resolving swelling, no ecchymosis.  The wound is healing well, sutures are in place.  There is no surrounding erythema or drainage from the wound.  She is able to open and close her fist, sensation is intact throughout, 2+ radial pulse.

## 2023-06-05 ENCOUNTER — APPOINTMENT (OUTPATIENT)
Dept: ORTHOPEDIC SURGERY | Facility: CLINIC | Age: 73
End: 2023-06-05
Payer: MEDICARE

## 2023-06-05 VITALS — BODY MASS INDEX: 38.98 KG/M2 | HEIGHT: 63 IN | WEIGHT: 220 LBS

## 2023-06-05 PROCEDURE — 99024 POSTOP FOLLOW-UP VISIT: CPT

## 2023-06-05 NOTE — PHYSICAL EXAM
[de-identified] : Patient has small area that is still open in the finger there is some fluid coming out of that area.  She has no redness or drainage surrounding it.  Range of motion of the finger.

## 2023-06-05 NOTE — ASSESSMENT
[FreeTextEntry1] : Patient is a left ring finger trigger digit done.  She has a wound issue at this time.  Concerned about there being a sinus tract developing she will consider going back to the office in the near future for evaluation regarding this.  The potential for opening up of the wound with 6 excision and then closing of the skin we discussed with the patient.  She will keep an eye on the situation of the sinus tract closes she will see us as needed
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
## 2023-06-22 ENCOUNTER — APPOINTMENT (OUTPATIENT)
Dept: ORTHOPEDIC SURGERY | Facility: CLINIC | Age: 73
End: 2023-06-22
Payer: MEDICARE

## 2023-06-22 DIAGNOSIS — M65.342 TRIGGER FINGER, LEFT RING FINGER: ICD-10-CM

## 2023-06-22 DIAGNOSIS — M18.11 UNILATERAL PRIMARY OSTEOARTHRITIS OF FIRST CARPOMETACARPAL JOINT, RIGHT HAND: ICD-10-CM

## 2023-06-22 PROCEDURE — 99213 OFFICE O/P EST LOW 20 MIN: CPT | Mod: 24

## 2023-06-22 NOTE — ASSESSMENT
[FreeTextEntry1] : Patient status post left ring finger trigger digit release.  Her wounds have healed.  On the right side she has basal joint arthritis.  Cortisone injections bracing anti-inflammatories and the surgical intervention for basal joint arthritis were discussed with the patient.  She does not want to have any surgery performed on the right hand.

## 2023-06-22 NOTE — HISTORY OF PRESENT ILLNESS
[de-identified] : 72-year-old female status post left ring finger trigger digit release.  She is happy that the wounds have healed and she is moving well.  She is also reporting right-sided thumb pain and discomfort.

## 2023-06-22 NOTE — PHYSICAL EXAM
[de-identified] : Patient is a well-healed surgical skin incision.  Good range of motion of the left hand.  There is no flexion contracture or Dupuytren's present.  On the right side she has pain discomfort along the basal joint with a positive axial grind.

## 2023-07-12 ENCOUNTER — OUTPATIENT (OUTPATIENT)
Dept: OUTPATIENT SERVICES | Facility: HOSPITAL | Age: 73
LOS: 1 days | End: 2023-07-12
Payer: MEDICARE

## 2023-07-12 ENCOUNTER — APPOINTMENT (OUTPATIENT)
Dept: PODIATRY | Facility: CLINIC | Age: 73
End: 2023-07-12
Payer: MEDICARE

## 2023-07-12 DIAGNOSIS — M79.671 PAIN IN RIGHT FOOT: ICD-10-CM

## 2023-07-12 DIAGNOSIS — E11.610 TYPE 2 DIABETES MELLITUS WITH DIABETIC NEUROPATHIC ARTHROPATHY: ICD-10-CM

## 2023-07-12 DIAGNOSIS — L97.509 NON-PRESSURE CHRONIC ULCER OF OTHER PART OF UNSPECIFIED FOOT WITH UNSPECIFIED SEVERITY: ICD-10-CM

## 2023-07-12 DIAGNOSIS — Z96.659 PRESENCE OF UNSPECIFIED ARTIFICIAL KNEE JOINT: Chronic | ICD-10-CM

## 2023-07-12 DIAGNOSIS — Z00.00 ENCOUNTER FOR GENERAL ADULT MEDICAL EXAMINATION WITHOUT ABNORMAL FINDINGS: ICD-10-CM

## 2023-07-12 DIAGNOSIS — M14.671 CHARCOT'S JOINT, RIGHT ANKLE AND FOOT: ICD-10-CM

## 2023-07-12 PROCEDURE — 99213 OFFICE O/P EST LOW 20 MIN: CPT

## 2023-07-24 ENCOUNTER — APPOINTMENT (OUTPATIENT)
Dept: PULMONOLOGY | Facility: CLINIC | Age: 73
End: 2023-07-24

## 2023-07-26 NOTE — PROCEDURE
[] : A mold was applied. [FreeTextEntry1] : right Charcot foot. orthotics fabricated from pink and white plastizote. went over wearing instructions and precautions. if any problem pt will not wear. will call if has a problem. follow up in several weeks with a telephone call.

## 2023-08-03 ENCOUNTER — OUTPATIENT (OUTPATIENT)
Dept: OUTPATIENT SERVICES | Facility: HOSPITAL | Age: 73
LOS: 1 days | End: 2023-08-03
Payer: MEDICARE

## 2023-08-03 ENCOUNTER — APPOINTMENT (OUTPATIENT)
Dept: PODIATRY | Facility: CLINIC | Age: 73
End: 2023-08-03
Payer: MEDICARE

## 2023-08-03 ENCOUNTER — APPOINTMENT (OUTPATIENT)
Dept: PULMONOLOGY | Facility: CLINIC | Age: 73
End: 2023-08-03

## 2023-08-03 DIAGNOSIS — Z96.659 PRESENCE OF UNSPECIFIED ARTIFICIAL KNEE JOINT: Chronic | ICD-10-CM

## 2023-08-03 DIAGNOSIS — Z00.00 ENCOUNTER FOR GENERAL ADULT MEDICAL EXAMINATION WITHOUT ABNORMAL FINDINGS: ICD-10-CM

## 2023-08-03 PROCEDURE — 99213 OFFICE O/P EST LOW 20 MIN: CPT

## 2023-08-07 NOTE — PROCEDURE
[] : A mold was applied. [FreeTextEntry1] : pt states feel comfortable with orthotics but that shoe feels that toes are hitting toe box with ambulation. feet measured with a Luna device. shoe size corresponds with foot size possibly orthotic pushing feet forward. added felt to tongues of shoes to keep feet further back,also ground down front of orthotic. pt will purchase a new snearker 1/2 size bigger. pt will make an appointment when obtains sneakers.

## 2023-08-08 ENCOUNTER — APPOINTMENT (OUTPATIENT)
Dept: PODIATRY | Facility: CLINIC | Age: 73
End: 2023-08-08
Payer: MEDICARE

## 2023-08-08 VITALS
TEMPERATURE: 96.5 F | WEIGHT: 217 LBS | BODY MASS INDEX: 38.45 KG/M2 | HEIGHT: 63 IN | HEART RATE: 105 BPM | OXYGEN SATURATION: 95 %

## 2023-08-08 DIAGNOSIS — E11.610 TYPE 2 DIABETES MELLITUS WITH DIABETIC NEUROPATHIC ARTHROPATHY: ICD-10-CM

## 2023-08-08 DIAGNOSIS — M79.671 PAIN IN RIGHT FOOT: ICD-10-CM

## 2023-08-08 DIAGNOSIS — M79.672 PAIN IN LEFT FOOT: ICD-10-CM

## 2023-08-08 PROCEDURE — 99213 OFFICE O/P EST LOW 20 MIN: CPT

## 2023-08-09 ENCOUNTER — APPOINTMENT (OUTPATIENT)
Dept: PULMONOLOGY | Facility: CLINIC | Age: 73
End: 2023-08-09
Payer: MEDICARE

## 2023-08-09 DIAGNOSIS — G47.33 OBSTRUCTIVE SLEEP APNEA (ADULT) (PEDIATRIC): ICD-10-CM

## 2023-08-09 DIAGNOSIS — E66.9 OBESITY, UNSPECIFIED: ICD-10-CM

## 2023-08-09 PROBLEM — E11.610 CHARCOT FOOT DUE TO DIABETES MELLITUS: Status: ACTIVE | Noted: 2023-02-13

## 2023-08-09 PROCEDURE — 99213 OFFICE O/P EST LOW 20 MIN: CPT | Mod: 95

## 2023-08-09 NOTE — ASSESSMENT
[FreeTextEntry1] : Assessment: CORTEZ Obesity   PLAN: The patient is benefitting from the PAP device. New supplies will be ordered when required. Weight loss discussed. I stressed the need maintain compliance with the PAP device. The patient is not to use an Ozone or UV sterilizer. F/U in 12 months

## 2023-08-09 NOTE — REASON FOR VISIT
[Follow-Up] : a follow-up visit [Sleep Apnea] : sleep apnea [Home] : at home, [unfilled] , at the time of the visit. [Medical Office: ([facility])___] : at the medical office located in  [Patient] : the patient [This encounter was initiated by telehealth (audio with video) and converted to telephone (audio only) due to technical difficulties.] : This encounter was initiated by telehealth (audio with video) and converted to telephone (audio only) due to technical difficulties. [TextBox_44] : doing well in general

## 2023-08-09 NOTE — REASON FOR VISIT
[Follow-Up] : a follow-up visit [Sleep Apnea] : sleep apnea [Home] : at home, [unfilled] , at the time of the visit. [Medical Office: ([facility])___] : at the medical office located in  [Patient] : the patient [This encounter was initiated by telehealth (audio with video) and converted to telephone (audio only) due to technical difficulties.] : This encounter was initiated by telehealth (audio with video) and converted to telephone (audio only) due to technical difficulties. [TextBox_44] : doing well in general

## 2023-08-09 NOTE — REASON FOR VISIT
[Follow-Up] : a follow-up visit [Sleep Apnea] : sleep apnea [Home] : at home, [unfilled] , at the time of the visit. [Medical Office: ([facility])___] : at the medical office located in  [Patient] : the patient [This encounter was initiated by telehealth (audio with video) and converted to telephone (audio only) due to technical difficulties.] : This encounter was initiated by telehealth (audio with video) and converted to telephone (audio only) due to technical difficulties. [TextBox_44] : doing well in general

## 2023-09-14 ENCOUNTER — OUTPATIENT (OUTPATIENT)
Dept: OUTPATIENT SERVICES | Facility: HOSPITAL | Age: 73
LOS: 1 days | End: 2023-09-14
Payer: MEDICARE

## 2023-09-14 ENCOUNTER — APPOINTMENT (OUTPATIENT)
Dept: PODIATRY | Facility: CLINIC | Age: 73
End: 2023-09-14
Payer: MEDICARE

## 2023-09-14 DIAGNOSIS — Z00.00 ENCOUNTER FOR GENERAL ADULT MEDICAL EXAMINATION WITHOUT ABNORMAL FINDINGS: ICD-10-CM

## 2023-09-14 DIAGNOSIS — Z96.659 PRESENCE OF UNSPECIFIED ARTIFICIAL KNEE JOINT: Chronic | ICD-10-CM

## 2023-09-14 PROCEDURE — 99213 OFFICE O/P EST LOW 20 MIN: CPT

## 2023-09-18 ENCOUNTER — APPOINTMENT (OUTPATIENT)
Dept: CARDIOLOGY | Facility: CLINIC | Age: 73
End: 2023-09-18
Payer: MEDICARE

## 2023-09-18 VITALS — DIASTOLIC BLOOD PRESSURE: 80 MMHG | SYSTOLIC BLOOD PRESSURE: 120 MMHG | HEART RATE: 83 BPM | RESPIRATION RATE: 18 BRPM

## 2023-09-18 VITALS — HEIGHT: 63 IN

## 2023-09-18 PROCEDURE — 99214 OFFICE O/P EST MOD 30 MIN: CPT | Mod: 25

## 2023-09-18 PROCEDURE — 93000 ELECTROCARDIOGRAM COMPLETE: CPT

## 2023-09-22 DIAGNOSIS — M14.672 CHARCOT'S JOINT, LEFT ANKLE AND FOOT: ICD-10-CM
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
## 2023-09-22 DIAGNOSIS — M79.672 PAIN IN LEFT FOOT: ICD-10-CM

## 2023-09-22 DIAGNOSIS — M77.41 METATARSALGIA, RIGHT FOOT: ICD-10-CM
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
## 2023-09-22 DIAGNOSIS — M79.671 PAIN IN RIGHT FOOT: ICD-10-CM

## 2023-09-22 DIAGNOSIS — M14.671 CHARCOT'S JOINT, RIGHT ANKLE AND FOOT: ICD-10-CM

## 2023-12-21 ENCOUNTER — OUTPATIENT (OUTPATIENT)
Dept: OUTPATIENT SERVICES | Facility: HOSPITAL | Age: 73
LOS: 1 days | End: 2023-12-21
Payer: MEDICARE

## 2023-12-21 DIAGNOSIS — Z96.659 PRESENCE OF UNSPECIFIED ARTIFICIAL KNEE JOINT: Chronic | ICD-10-CM

## 2023-12-21 DIAGNOSIS — M25.552 PAIN IN LEFT HIP: ICD-10-CM

## 2023-12-21 PROCEDURE — 73502 X-RAY EXAM HIP UNI 2-3 VIEWS: CPT | Mod: LT

## 2023-12-21 PROCEDURE — 73502 X-RAY EXAM HIP UNI 2-3 VIEWS: CPT | Mod: 26,LT

## 2023-12-22 DIAGNOSIS — M25.552 PAIN IN LEFT HIP: ICD-10-CM

## 2024-01-18 ENCOUNTER — APPOINTMENT (OUTPATIENT)
Dept: CARDIOLOGY | Facility: CLINIC | Age: 74
End: 2024-01-18
Payer: MEDICARE

## 2024-01-18 PROCEDURE — 93306 TTE W/DOPPLER COMPLETE: CPT

## 2024-01-29 ENCOUNTER — APPOINTMENT (OUTPATIENT)
Dept: ORTHOPEDIC SURGERY | Facility: CLINIC | Age: 74
End: 2024-01-29

## 2024-03-18 ENCOUNTER — APPOINTMENT (OUTPATIENT)
Dept: CARDIOLOGY | Facility: CLINIC | Age: 74
End: 2024-03-18
Payer: MEDICARE

## 2024-03-18 VITALS — DIASTOLIC BLOOD PRESSURE: 70 MMHG | SYSTOLIC BLOOD PRESSURE: 110 MMHG | RESPIRATION RATE: 18 BRPM | HEART RATE: 84 BPM

## 2024-03-18 VITALS — HEIGHT: 63 IN | WEIGHT: 216 LBS | BODY MASS INDEX: 38.27 KG/M2

## 2024-03-18 DIAGNOSIS — Z87.448 PERSONAL HISTORY OF OTHER DISEASES OF URINARY SYSTEM: ICD-10-CM

## 2024-03-18 DIAGNOSIS — I35.0 NONRHEUMATIC AORTIC (VALVE) STENOSIS: ICD-10-CM

## 2024-03-18 DIAGNOSIS — I35.1 NONRHEUMATIC AORTIC (VALVE) INSUFFICIENCY: ICD-10-CM

## 2024-03-18 DIAGNOSIS — Z86.39 PERSONAL HISTORY OF OTHER ENDOCRINE, NUTRITIONAL AND METABOLIC DISEASE: ICD-10-CM

## 2024-03-18 DIAGNOSIS — I10 ESSENTIAL (PRIMARY) HYPERTENSION: ICD-10-CM

## 2024-03-18 DIAGNOSIS — I34.0 NONRHEUMATIC MITRAL (VALVE) INSUFFICIENCY: ICD-10-CM

## 2024-03-18 DIAGNOSIS — E11.9 TYPE 2 DIABETES MELLITUS W/OUT COMPLICATIONS: ICD-10-CM

## 2024-03-18 PROCEDURE — 93000 ELECTROCARDIOGRAM COMPLETE: CPT

## 2024-03-18 PROCEDURE — 99214 OFFICE O/P EST MOD 30 MIN: CPT | Mod: 25

## 2024-03-18 NOTE — PHYSICAL EXAM
[General Appearance - Well Developed] : well developed [Normal Appearance] : normal appearance [Well Groomed] : well groomed [No Deformities] : no deformities [General Appearance - Well Nourished] : well nourished [Normal Conjunctiva] : the conjunctiva exhibited no abnormalities [General Appearance - In No Acute Distress] : no acute distress [Eyelids - No Xanthelasma] : the eyelids demonstrated no xanthelasmas [Normal Oral Mucosa] : normal oral mucosa [No Oral Cyanosis] : no oral cyanosis [No Oral Pallor] : no oral pallor [Normal Jugular Venous A Waves Present] : normal jugular venous A waves present [Normal Jugular Venous V Waves Present] : normal jugular venous V waves present [No Jugular Venous Schumacher A Waves] : no jugular venous schumacher A waves [Heart Rate And Rhythm] : heart rate and rhythm were normal [Heart Sounds] : normal S1 and S2 [Systolic grade ___/6] : A grade [unfilled]/6 systolic murmur was heard. [Respiration, Rhythm And Depth] : normal respiratory rhythm and effort [Exaggerated Use Of Accessory Muscles For Inspiration] : no accessory muscle use [Bowel Sounds] : normal bowel sounds [Auscultation Breath Sounds / Voice Sounds] : lungs were clear to auscultation bilaterally [Abdomen Soft] : soft [Abdomen Tenderness] : non-tender [Abdomen Mass (___ Cm)] : no abdominal mass palpated [FreeTextEntry1] : walks with cane [Cyanosis, Localized] : no localized cyanosis [Nail Clubbing] : no clubbing of the fingernails [Petechial Hemorrhages (___cm)] : no petechial hemorrhages [] : no rash [Skin Color & Pigmentation] : normal skin color and pigmentation [No Venous Stasis] : no venous stasis [Skin Lesions] : no skin lesions [No Skin Ulcers] : no skin ulcer [Oriented To Time, Place, And Person] : oriented to person, place, and time [No Xanthoma] : no  xanthoma was observed

## 2024-09-18 NOTE — ED ADULT NURSE NOTE - NSSEPSISSUSPECTED_ED_A_ED
67y Male with history of long standing HTN and DM presents with SOB. Nephrology consulted for elevated Scr.    1) CKD-3a: Baseline Scr 1.5-1.7 as per PMD likely due to long standing HTN and DM. Scr stable despite CT with IV contrast and LHC on 9/17. Defer further work up as patient follows with an outpatient nephrologist. Avoid nephrotoxins. Monitor electrolytes.    2) HTN with CKD: BP improving. Continue with current medications. Holding home lisinopril given plans for repeat LHC and recent contrast.    3) LE edema: With elevated LVEDP during LHC. Continue with lasix 40 mg IV twice daily as per cardiology. F/U TTE. Monitor UO.    4) CAD: For repeat LHC once euvolemic. Patient educated that he has a 14% risk of developing JASVIR and 0.1% risk of requiring RRT. Would optimize by starting mucomyst 1200 mg PO Q12 hours X 4 doses the evening prior to LHC and holding diuretics on the day of procedure.      [facility] NEPHROLOG[facility]  Jorge Hurst M.D.  Shawn Corbin D.O.  Lilli Gallagher M.D.  MD Zoila Franklin, MSN, ANP-C    Telephone: [phone]  Facsimile: [phone] [place] [address], [address]  [place] [address]   62 year old male with hx  Hypertension, Diabetes Mellitus, Aortic Regurgitation, Diastolic Heart Failure, CBD Stone, s/p Stent, PAD, Coronavirus 3/2020, Chronic Kidney Disease presenting with sob, uncontrolled HTN     #uncontrolled HTN   -pt reports he missed recent meds d/t SOB   -sys bp 200s on admission   -resume outpt meds -labetalol 300mg TID, doxasozin 2mg daily in PM, clon 0.2mg BID, norvasc 10mg daily, and hydral 25mg BID  -remains off lisinopril pending labs due to recent NANCY  -monitor edema, if increases will dec amlodopine dosing to5 qd  -awaiting cta chest/ abd to r/o dissection    #acute on chronic Diastolic CHF   -likely in the setting of uncontrolled HTN   -plan for echo   -plan for cath   -hs trop x1 neg   -repeat 2nd trop   -ecg with no ischemic changes, lVH with repol noted   -start lasix 40 mg IVP daily   -check Chest xray   -resume ASA 81 mg daily     # Aortic Regurgitation  -prior echo revealed stable AI, mild-moderate  -check ECHO     # PAD  -known moderate distal right SFA disease  -resume ASA and continue to monitor clinically      dvt ppx  No

## 2024-10-22 ENCOUNTER — APPOINTMENT (OUTPATIENT)
Dept: CARDIOLOGY | Facility: CLINIC | Age: 74
End: 2024-10-22
Payer: MEDICARE

## 2024-10-22 VITALS — WEIGHT: 216 LBS | HEIGHT: 63 IN | BODY MASS INDEX: 38.27 KG/M2

## 2024-10-22 VITALS — DIASTOLIC BLOOD PRESSURE: 70 MMHG | SYSTOLIC BLOOD PRESSURE: 124 MMHG | HEART RATE: 84 BPM | RESPIRATION RATE: 18 BRPM

## 2024-10-22 DIAGNOSIS — I35.1 NONRHEUMATIC AORTIC (VALVE) INSUFFICIENCY: ICD-10-CM

## 2024-10-22 DIAGNOSIS — I35.0 NONRHEUMATIC AORTIC (VALVE) STENOSIS: ICD-10-CM

## 2024-10-22 DIAGNOSIS — I34.0 NONRHEUMATIC MITRAL (VALVE) INSUFFICIENCY: ICD-10-CM

## 2024-10-22 DIAGNOSIS — E11.9 TYPE 2 DIABETES MELLITUS W/OUT COMPLICATIONS: ICD-10-CM

## 2024-10-22 DIAGNOSIS — I10 ESSENTIAL (PRIMARY) HYPERTENSION: ICD-10-CM

## 2024-10-22 DIAGNOSIS — Z87.448 PERSONAL HISTORY OF OTHER DISEASES OF URINARY SYSTEM: ICD-10-CM

## 2024-10-22 PROCEDURE — 93000 ELECTROCARDIOGRAM COMPLETE: CPT

## 2024-10-22 PROCEDURE — 99214 OFFICE O/P EST MOD 30 MIN: CPT

## 2024-10-22 PROCEDURE — G2211 COMPLEX E/M VISIT ADD ON: CPT

## 2024-12-03 NOTE — ASU PREOP CHECKLIST - AICD PRESENT
[id number]     [facility]   #[id number]    
Well Adult Note  Name: Jose Alejandro Mesa Today’s Date: 12/3/2024   MRN: [id number] Sex: Male   Age: 62 y.o. Ethnicity: Non- / Non    : 1962 Race: Other Asian      Jose Alejanrdo Mesa is here for a well adult exam.       Assessment & Plan   Encounter for well adult exam without abnormal findings  Assessment & Plan:   Patient comes in for wellness exam  patient  does not have any complaints today   we discussed age appropriate USPSTF screens  Over 75% of the visit was spent counselling patient on appropriate lifestyle choices.   Orders:  -     Hemoglobin A1C; Future  -     CBC with Auto Differential; Future  -     Comprehensive Metabolic Panel; Future  -     Microalbumin / Creatinine Urine Ratio; Future  -     Lipid Panel; Future  -     PSA Screening; Future  -     TSH with Reflex; Future  -     Vitamin B12; Future  -     Vitamin D 25 Hydroxy; Future  Type 2 diabetes mellitus without complication, without long-term current use of insulin (HCC)  Assessment & Plan:   A1C:   Lab Results   Component Value Date/Time    LABA1C 6.6 2024 10:31 AM    LABA1C 7.0 2022 05:21 AM     Stable- continue home blood sugar monitoring  Counselled on Diet and exercise with goal to achieve appropriate BMI  Continue Diabetic medication as documented in medication list Metformin  500 mg twice a day  Continue home foot care  Patient agreed with plan with verbal understanding     Orders:  -     Hemoglobin A1C; Future  -     CBC with Auto Differential; Future  -     Comprehensive Metabolic Panel; Future  -     Microalbumin / Creatinine Urine Ratio; Future  -     Vitamin B12; Future  Other hyperlipidemia  Assessment & Plan:   Stable and controlled  Continue medication as documented in your medication list   Atorvastatin 80 mg nightly  Orders:  -     Lipid Panel; Future  Primary hypertension  Assessment & Plan:   Stable  Continue anti-hypertensive medication as documented in your medication list  HCTZ  25 mg  To verify 
no

## 2025-01-02 NOTE — ED ADULT NURSE NOTE - HIV OFFER
Goal Outcome Evaluation:  VSS, c/o pain on right back, states pain has moved from left side to right since the epidural radiating upward, no c/o numbnedd or tingling, up with assist & walker to BR, voiding w/o difficulty, BM,  falls protocol, bed alarm in use, D/C home, Home health to follow for PT  Plan of Care Reviewed With: patient                                          Opt out

## 2025-02-20 ENCOUNTER — APPOINTMENT (OUTPATIENT)
Dept: OBGYN | Facility: CLINIC | Age: 75
End: 2025-02-20
Payer: MEDICARE

## 2025-02-20 DIAGNOSIS — Z01.419 ENCOUNTER FOR GYNECOLOGICAL EXAMINATION (GENERAL) (ROUTINE) W/OUT ABNORMAL FINDINGS: ICD-10-CM

## 2025-02-20 PROCEDURE — G0101: CPT | Mod: GA

## 2025-02-20 RX ORDER — CLOTRIMAZOLE AND BETAMETHASONE DIPROPIONATE 10; .5 MG/G; MG/G
1-0.05 CREAM TOPICAL TWICE DAILY
Qty: 1 | Refills: 3 | Status: ACTIVE | COMMUNITY
Start: 2025-02-20 | End: 1900-01-01

## 2025-02-25 LAB — CYTOLOGY CVX/VAG DOC THIN PREP: ABNORMAL

## 2025-03-20 ENCOUNTER — INPATIENT (INPATIENT)
Facility: HOSPITAL | Age: 75
LOS: 7 days | Discharge: NURSING, REHAB OR LONG-TERM CARE | DRG: 41 | End: 2025-03-28
Attending: STUDENT IN AN ORGANIZED HEALTH CARE EDUCATION/TRAINING PROGRAM | Admitting: STUDENT IN AN ORGANIZED HEALTH CARE EDUCATION/TRAINING PROGRAM
Payer: MEDICARE

## 2025-03-20 VITALS
RESPIRATION RATE: 18 BRPM | OXYGEN SATURATION: 98 % | HEART RATE: 79 BPM | DIASTOLIC BLOOD PRESSURE: 90 MMHG | SYSTOLIC BLOOD PRESSURE: 163 MMHG

## 2025-03-20 DIAGNOSIS — Z96.659 PRESENCE OF UNSPECIFIED ARTIFICIAL KNEE JOINT: Chronic | ICD-10-CM

## 2025-03-20 LAB
ALBUMIN SERPL ELPH-MCNC: 4.2 G/DL — SIGNIFICANT CHANGE UP (ref 3.5–5.2)
ALP SERPL-CCNC: 141 U/L — HIGH (ref 30–115)
ALT FLD-CCNC: 16 U/L — SIGNIFICANT CHANGE UP (ref 0–41)
ANION GAP SERPL CALC-SCNC: 11 MMOL/L — SIGNIFICANT CHANGE UP (ref 7–14)
APTT BLD: 32.4 SEC — SIGNIFICANT CHANGE UP (ref 27–39.2)
AST SERPL-CCNC: 29 U/L — SIGNIFICANT CHANGE UP (ref 0–41)
BASOPHILS # BLD AUTO: 0 K/UL — SIGNIFICANT CHANGE UP (ref 0–0.2)
BASOPHILS NFR BLD AUTO: 0 % — SIGNIFICANT CHANGE UP (ref 0–1)
BILIRUB SERPL-MCNC: 0.5 MG/DL — SIGNIFICANT CHANGE UP (ref 0.2–1.2)
BUN SERPL-MCNC: 30 MG/DL — HIGH (ref 10–20)
CALCIUM SERPL-MCNC: 9.6 MG/DL — SIGNIFICANT CHANGE UP (ref 8.4–10.5)
CHLORIDE SERPL-SCNC: 103 MMOL/L — SIGNIFICANT CHANGE UP (ref 98–110)
CO2 SERPL-SCNC: 26 MMOL/L — SIGNIFICANT CHANGE UP (ref 17–32)
CREAT SERPL-MCNC: 1.8 MG/DL — HIGH (ref 0.7–1.5)
EGFR: 29 ML/MIN/1.73M2 — LOW
EGFR: 29 ML/MIN/1.73M2 — LOW
EOSINOPHIL # BLD AUTO: 0.04 K/UL — SIGNIFICANT CHANGE UP (ref 0–0.7)
EOSINOPHIL NFR BLD AUTO: 0.9 % — SIGNIFICANT CHANGE UP (ref 0–8)
GLUCOSE BLDC GLUCOMTR-MCNC: 139 MG/DL — HIGH (ref 70–99)
GLUCOSE SERPL-MCNC: 158 MG/DL — HIGH (ref 70–99)
HCT VFR BLD CALC: 27.7 % — LOW (ref 37–47)
HGB BLD-MCNC: 9.6 G/DL — LOW (ref 12–16)
INR BLD: 0.94 RATIO — SIGNIFICANT CHANGE UP (ref 0.65–1.3)
LYMPHOCYTES # BLD AUTO: 0.81 K/UL — LOW (ref 1.2–3.4)
LYMPHOCYTES # BLD AUTO: 20.6 % — SIGNIFICANT CHANGE UP (ref 20.5–51.1)
MCHC RBC-ENTMCNC: 34.7 G/DL — SIGNIFICANT CHANGE UP (ref 32–37)
MCHC RBC-ENTMCNC: 37.9 PG — HIGH (ref 27–31)
MCV RBC AUTO: 109.5 FL — HIGH (ref 81–99)
MONOCYTES # BLD AUTO: 0.29 K/UL — SIGNIFICANT CHANGE UP (ref 0.1–0.6)
MONOCYTES NFR BLD AUTO: 7.5 % — SIGNIFICANT CHANGE UP (ref 1.7–9.3)
NEUTROPHILS # BLD AUTO: 2.6 K/UL — SIGNIFICANT CHANGE UP (ref 1.4–6.5)
NEUTROPHILS NFR BLD AUTO: 66.3 % — SIGNIFICANT CHANGE UP (ref 42.2–75.2)
NRBC BLD AUTO-RTO: SIGNIFICANT CHANGE UP /100 WBCS (ref 0–0)
PLATELET # BLD AUTO: 174 K/UL — SIGNIFICANT CHANGE UP (ref 130–400)
PMV BLD: 9.4 FL — SIGNIFICANT CHANGE UP (ref 7.4–10.4)
POTASSIUM SERPL-MCNC: 4.5 MMOL/L — SIGNIFICANT CHANGE UP (ref 3.5–5)
POTASSIUM SERPL-SCNC: 4.5 MMOL/L — SIGNIFICANT CHANGE UP (ref 3.5–5)
PROT SERPL-MCNC: 6.4 G/DL — SIGNIFICANT CHANGE UP (ref 6–8)
PROTHROM AB SERPL-ACNC: 11.1 SEC — SIGNIFICANT CHANGE UP (ref 9.95–12.87)
RBC # BLD: 2.53 M/UL — LOW (ref 4.2–5.4)
RBC # FLD: 13.8 % — SIGNIFICANT CHANGE UP (ref 11.5–14.5)
SODIUM SERPL-SCNC: 140 MMOL/L — SIGNIFICANT CHANGE UP (ref 135–146)
TROPONIN T, HIGH SENSITIVITY RESULT: 74 NG/L — CRITICAL HIGH (ref 6–13)
WBC # BLD: 3.92 K/UL — LOW (ref 4.8–10.8)
WBC # FLD AUTO: 3.92 K/UL — LOW (ref 4.8–10.8)

## 2025-03-20 PROCEDURE — 80061 LIPID PANEL: CPT

## 2025-03-20 PROCEDURE — C1764: CPT

## 2025-03-20 PROCEDURE — 71045 X-RAY EXAM CHEST 1 VIEW: CPT

## 2025-03-20 PROCEDURE — 97535 SELF CARE MNGMENT TRAINING: CPT | Mod: GO

## 2025-03-20 PROCEDURE — 84484 ASSAY OF TROPONIN QUANT: CPT

## 2025-03-20 PROCEDURE — 36415 COLL VENOUS BLD VENIPUNCTURE: CPT

## 2025-03-20 PROCEDURE — 92526 ORAL FUNCTION THERAPY: CPT | Mod: GN

## 2025-03-20 PROCEDURE — 70498 CT ANGIOGRAPHY NECK: CPT | Mod: 26

## 2025-03-20 PROCEDURE — 92610 EVALUATE SWALLOWING FUNCTION: CPT | Mod: GN

## 2025-03-20 PROCEDURE — 70450 CT HEAD/BRAIN W/O DYE: CPT | Mod: 26,XU

## 2025-03-20 PROCEDURE — 93005 ELECTROCARDIOGRAM TRACING: CPT

## 2025-03-20 PROCEDURE — 82962 GLUCOSE BLOOD TEST: CPT

## 2025-03-20 PROCEDURE — 93306 TTE W/DOPPLER COMPLETE: CPT

## 2025-03-20 PROCEDURE — G0425: CPT | Mod: 93

## 2025-03-20 PROCEDURE — 80053 COMPREHEN METABOLIC PANEL: CPT

## 2025-03-20 PROCEDURE — 83036 HEMOGLOBIN GLYCOSYLATED A1C: CPT

## 2025-03-20 PROCEDURE — 82728 ASSAY OF FERRITIN: CPT

## 2025-03-20 PROCEDURE — 0042T: CPT

## 2025-03-20 PROCEDURE — 97530 THERAPEUTIC ACTIVITIES: CPT | Mod: GP

## 2025-03-20 PROCEDURE — 70551 MRI BRAIN STEM W/O DYE: CPT | Mod: MC

## 2025-03-20 PROCEDURE — 85025 COMPLETE CBC W/AUTO DIFF WBC: CPT

## 2025-03-20 PROCEDURE — 93010 ELECTROCARDIOGRAM REPORT: CPT

## 2025-03-20 PROCEDURE — 84100 ASSAY OF PHOSPHORUS: CPT

## 2025-03-20 PROCEDURE — 97166 OT EVAL MOD COMPLEX 45 MIN: CPT | Mod: GO

## 2025-03-20 PROCEDURE — 33285 INSJ SUBQ CAR RHYTHM MNTR: CPT

## 2025-03-20 PROCEDURE — 83550 IRON BINDING TEST: CPT

## 2025-03-20 PROCEDURE — 84443 ASSAY THYROID STIM HORMONE: CPT

## 2025-03-20 PROCEDURE — 85576 BLOOD PLATELET AGGREGATION: CPT

## 2025-03-20 PROCEDURE — 83540 ASSAY OF IRON: CPT

## 2025-03-20 PROCEDURE — 83735 ASSAY OF MAGNESIUM: CPT

## 2025-03-20 PROCEDURE — 70496 CT ANGIOGRAPHY HEAD: CPT | Mod: 26

## 2025-03-20 PROCEDURE — 97110 THERAPEUTIC EXERCISES: CPT | Mod: GP

## 2025-03-20 PROCEDURE — 97162 PT EVAL MOD COMPLEX 30 MIN: CPT | Mod: GP

## 2025-03-20 PROCEDURE — 99285 EMERGENCY DEPT VISIT HI MDM: CPT

## 2025-03-20 PROCEDURE — 97116 GAIT TRAINING THERAPY: CPT | Mod: GP

## 2025-03-20 RX ORDER — DULOXETINE 20 MG/1
60 CAPSULE, DELAYED RELEASE ORAL DAILY
Refills: 0 | Status: DISCONTINUED | OUTPATIENT
Start: 2025-03-21 | End: 2025-03-28

## 2025-03-20 RX ORDER — DEXTROSE 50 % IN WATER 50 %
12.5 SYRINGE (ML) INTRAVENOUS ONCE
Refills: 0 | Status: DISCONTINUED | OUTPATIENT
Start: 2025-03-20 | End: 2025-03-22

## 2025-03-20 RX ORDER — DEXTROSE 50 % IN WATER 50 %
15 SYRINGE (ML) INTRAVENOUS ONCE
Refills: 0 | Status: DISCONTINUED | OUTPATIENT
Start: 2025-03-20 | End: 2025-03-22

## 2025-03-20 RX ORDER — LEVOTHYROXINE SODIUM 300 MCG
150 TABLET ORAL DAILY
Refills: 0 | Status: DISCONTINUED | OUTPATIENT
Start: 2025-03-21 | End: 2025-03-28

## 2025-03-20 RX ORDER — INSULIN LISPRO 100 [IU]/ML
0 INJECTION, SUSPENSION SUBCUTANEOUS
Refills: 0 | DISCHARGE

## 2025-03-20 RX ORDER — GLUCAGON 3 MG/1
1 POWDER NASAL ONCE
Refills: 0 | Status: DISCONTINUED | OUTPATIENT
Start: 2025-03-20 | End: 2025-03-22

## 2025-03-20 RX ORDER — ATORVASTATIN CALCIUM 80 MG/1
80 TABLET, FILM COATED ORAL AT BEDTIME
Refills: 0 | Status: DISCONTINUED | OUTPATIENT
Start: 2025-03-20 | End: 2025-03-28

## 2025-03-20 RX ORDER — SODIUM CHLORIDE 9 G/1000ML
1000 INJECTION, SOLUTION INTRAVENOUS
Refills: 0 | Status: DISCONTINUED | OUTPATIENT
Start: 2025-03-20 | End: 2025-03-22

## 2025-03-20 RX ORDER — LISINOPRIL AND HYDROCHLOROTHIAZIDE 12.5; 2 MG/1; MG/1
1 TABLET ORAL
Refills: 0 | DISCHARGE

## 2025-03-20 RX ORDER — CLOPIDOGREL BISULFATE 75 MG/1
75 TABLET, FILM COATED ORAL DAILY
Refills: 0 | Status: DISCONTINUED | OUTPATIENT
Start: 2025-03-21 | End: 2025-03-21

## 2025-03-20 RX ORDER — DEXTROSE 50 % IN WATER 50 %
25 SYRINGE (ML) INTRAVENOUS ONCE
Refills: 0 | Status: DISCONTINUED | OUTPATIENT
Start: 2025-03-20 | End: 2025-03-22

## 2025-03-20 RX ORDER — ASPIRIN 325 MG
81 TABLET ORAL DAILY
Refills: 0 | Status: DISCONTINUED | OUTPATIENT
Start: 2025-03-21 | End: 2025-03-28

## 2025-03-20 RX ORDER — CLOPIDOGREL BISULFATE 75 MG/1
300 TABLET, FILM COATED ORAL ONCE
Refills: 0 | Status: COMPLETED | OUTPATIENT
Start: 2025-03-20 | End: 2025-03-20

## 2025-03-20 RX ORDER — INSULIN LISPRO 100 U/ML
INJECTION, SOLUTION INTRAVENOUS; SUBCUTANEOUS
Refills: 0 | Status: DISCONTINUED | OUTPATIENT
Start: 2025-03-20 | End: 2025-03-22

## 2025-03-20 RX ORDER — ENOXAPARIN SODIUM 100 MG/ML
40 INJECTION SUBCUTANEOUS EVERY 24 HOURS
Refills: 0 | Status: DISCONTINUED | OUTPATIENT
Start: 2025-03-20 | End: 2025-03-28

## 2025-03-20 RX ORDER — ASPIRIN 325 MG
324 TABLET ORAL ONCE
Refills: 0 | Status: COMPLETED | OUTPATIENT
Start: 2025-03-20 | End: 2025-03-20

## 2025-03-20 RX ADMIN — Medication 50 MILLILITER(S): at 20:48

## 2025-03-20 RX ADMIN — CLOPIDOGREL BISULFATE 300 MILLIGRAM(S): 75 TABLET, FILM COATED ORAL at 20:05

## 2025-03-20 RX ADMIN — ATORVASTATIN CALCIUM 80 MILLIGRAM(S): 80 TABLET, FILM COATED ORAL at 21:20

## 2025-03-20 RX ADMIN — Medication 324 MILLIGRAM(S): at 20:06

## 2025-03-20 RX ADMIN — Medication 20 MILLIGRAM(S): at 21:20

## 2025-03-20 NOTE — H&P ADULT - NSHPLABSRESULTS_GEN_ALL_CORE
LABS:                        9.6    3.92  )-----------( 174      ( 20 Mar 2025 18:15 )             27.7     03-20    140  |  103  |  30[H]  ----------------------------<  158[H]  4.5   |  26  |  1.8[H]    Ca    9.6      20 Mar 2025 18:15    TPro  6.4  /  Alb  4.2  /  TBili  0.5  /  DBili  x   /  AST  29  /  ALT  16  /  AlkPhos  141[H]  03-20    PT/INR - ( 20 Mar 2025 18:15 )   PT: 11.10 sec;   INR: 0.94 ratio         PTT - ( 20 Mar 2025 18:15 )  PTT:32.4 sec

## 2025-03-20 NOTE — STROKE CODE NOTE - NSMDCONSULT QTN_Y FT
74 year old female PMH HTN, hyperthyroidism, HLD who presented to the ED due to slurred speech and dropping things out of the right hand, last known well yesterday. Stroke prenotification called in Ed at 1746. patient with NIHSS 1 for slurred speech, slight pronator drift, dysconjugate gaze at baseline, right leg with drift.   CTH with hypodenisty in right frontal. CTA with severe stenosis of left M2.  not a candidate for tnk as patient out of window and with nondisabling symptoms.     Plan:  - admit to stroke unit  - load with aspirin 324mg & Plavix 300mg now   - high dose statin  - BP goal 140-180  - start NS 50cc/hr  - tte  - q4 hour neuro checks  - bedside dysphagia screen   - PT/OT/SLP   - Obtain Hemoglobin A1C, Lipid Profile Panel, and TSH  - DVT prophylaxis 74 year old female PMH HTN, hyperthyroidism, HLD who presented to the ED due to slurred speech and dropping things out of the right hand, last known well yesterday. Stroke prenotification called in Ed at 1746. patient with NIHSS 1 for slurred speech, slight pronator drift, dysconjugate gaze at baseline, right leg with drift.   CTH with hypodenisty in right frontal. CTA with severe stenosis of left M2.  not a candidate for tnk as patient out of window and with nondisabling symptoms.     Plan:  - admit to stroke unit  - load with aspirin 324mg & Plavix 300mg now   - high dose statin  - BP goal 140-180  - start NS 50cc/hr  - tte  - q4 hour neuro checks  - bedside dysphagia screen   - PT/OT/SLP   - Obtain Hemoglobin A1C, Lipid Profile Panel, and TSH  - DVT prophylaxis    Discussed with Telestroke attending, Dr. Deejay Dc

## 2025-03-20 NOTE — ED PROVIDER NOTE - CLINICAL SUMMARY MEDICAL DECISION MAKING FREE TEXT BOX
pt presenting for eval of slurred speech, weakness. lkw yesterday. no falls, injuries, ac use. Well appearing, NAD, non toxic. NCAT PERRLA EOMI neck supple non tender normal wob cta bl rrr abdomen s nt nd no rebound no guarding WWPx4 +dysarthria, RLE weakness, otherwise neuro non focal. labs ekg imaging reviewed. sp neuro eval. will admit for further eval

## 2025-03-20 NOTE — H&P ADULT - ATTENDING COMMENTS
Pt is a 75 yo F with PMHxnof HTN, HLD, DM, CORTEZ (noncompliant with CPAP), remote smoker, aortic stenosis, who presented with right hand weakness and dysarthria. Improved today, NIHSS 2. CT head without acute process. CTA head/neck with focal critical left M1 stenosis,    Impr: left lentiform nucleus/corona radiata small scattered strokes  Given overall appearance of L MCA, favor underlying embolus rather than atherosclerotic plaque  PTA: ASA/plavix. . D/c plavix and start brilinta 90mg BID  Discussed importance of CORTEZ management  ILR placement pending  PT/OT/ST, tele, -160, dispo planning-

## 2025-03-20 NOTE — H&P ADULT - NSHPPHYSICALEXAM_GEN_ALL_CORE
Neurological Exam:   Mental status: Awake, alert and oriented x3.  Recent and remote memory intact.  No dysarthria, no aphasia.    Cranial nerves: Pupils equally round and reactive to light, visual fields full, no nystagmus, extraocular muscles intact, V1 through V3 intact bilaterally and symmetric, face symmetric, hearing intact to finger rub, tongue was midline.  (+) Baseline disconjugate gaze.  Motor:  No drift.  R hand  4/5 with slow finger tap, 4+/5 proximal RLE.  LUE/LLE 5/5.  Normal tone and bulk.  No abnormal movements.    Sensation: Intact to light touch, proprioception, and pinprick.  Coordination: No dysmetria on finger-to-nose and heel-to-shin.  Reflexes: 1+ in bilateral UE/LE  Gait: deferred    NIHSS - 1 (dysarthria)

## 2025-03-20 NOTE — ED PROVIDER NOTE - PHYSICAL EXAMINATION
VITAL SIGNS: I have reviewed nursing notes and confirm.  CONSTITUTIONAL: well-appearing, non-toxic, NAD  SKIN: Warm dry, normal skin turgor  HEAD: NCAT  EYES: EOMI, PERRLA, no scleral icterus  ENT: Moist mucous membranes, normal pharynx with no erythema or exudates  NECK: Supple; non tender. Full ROM. No cervical LAD  CARD: RRR, no murmurs, rubs or gallops  RESP: clear to ausculation b/l.  No rales, rhonchi, or wheezing.  ABD: soft, non-tender, non-distended, no rebound or guarding. No CVA tenderness  EXT: Full ROM, no bony tenderness, no pedal edema, no calf tenderness  NEURO: normal motor. normal sensory. CN II-XII intact. Cerebellar testing normal.  PSYCH: Cooperative, appropriate. VITAL SIGNS: I have reviewed nursing notes and confirm.  CONSTITUTIONAL: well-appearing, non-toxic, NAD  SKIN: Warm dry, normal skin turgor  HEAD: NCAT  EYES: EOMI, PERRLA, no scleral icterus  ENT: Moist mucous membranes  NECK: Supple; non tender. Full ROM.   CARD: RRR, no murmurs, rubs or gallops  RESP: clear to ausculation b/l.  No rales, rhonchi, or wheezing.  ABD: soft, non-tender, non-distended, no rebound or guarding.   EXT: Full ROM, no bony tenderness, no pedal edema, no calf tenderness  NEURO: (+) 3/5 strength RLE, 5/5 LLE, LUE, RUE normal sensory. CN II-XII intact. (+) slurred speech   PSYCH: Cooperative, appropriate.

## 2025-03-20 NOTE — ED ADULT NURSE NOTE - NSFALLHARMRISKINTERV_ED_ALL_ED

## 2025-03-20 NOTE — ED ADULT NURSE NOTE - OBJECTIVE STATEMENT
Patient is a 74 year old female coming in for slurred speech and right sided weakness- LKW noon. Stroke code called upon prenotification

## 2025-03-20 NOTE — H&P ADULT - ASSESSMENT
73yo woman PMH of aortic stenosis, hypertension, diabetes, CORTEZ, hypothyroidism, former smoker (quit 1996; still occasionally vapes medical marijuana) presenting with 1 day of R hand weakness and R leg weakness with additional dysarthria starting around 3pm on day of admission, found to have severe stenosis of L M1 and admitted to stroke unit.  Etiology likely ICAD given risk factors and severe L M1 stenosis correlating with symptoms.      #Acute Infarct  - s/p DAPT load  - c/w ASA + Plavix daily  - atorvastatin 80mg qD  - SBP goal 140-180  - NS 50cc overnight for BP control  - MRI non/con pending  - TTE pending  - stroke core measures pending    #HTN  - SBP goal 140-180  - holding home lisinoprile 20/25 mg BID    #Hypothyroid  - c/w home Synthroid 150mcg qD    #DM w/ neuropathy  - A1c pending  - takes home Humalog 75/25 pen   - mISS  - takes home gabapentin 400mg TID    #Depression/Anxiety  - takes home duloxetine 60mg qD  - takes home Bupropion SR 150mg qD    #PPX/dispo  - lovenox  - PT/OT/Physiatry pending 75yo woman PMH of aortic stenosis, hypertension, diabetes, CORTEZ, hypothyroidism, former smoker (quit 1996; still occasionally vapes medical marijuana) presenting with 1 day of R hand weakness and R leg weakness with additional dysarthria starting around 3pm on day of admission, found to have severe stenosis of L M1 and admitted to stroke unit.  Etiology likely ICAD given risk factors and severe L M1 stenosis correlating with symptoms.      #Acute Infarct  - s/p DAPT load  - c/w ASA + Plavix daily  - atorvastatin 80mg qD  - SBP goal 140-180  - NS 50cc overnight for BP control  - MRI non-con pending  - TTE pending  - stroke core measures pending    #HTN  - SBP goal 140-180  - holding home lisinoprile 20/25 mg BID    #Hypothyroid  - c/w home Synthroid 150mcg qD    #DM w/ neuropathy  - A1c pending  - takes home Humalog 75/25 pen   - mISS  - takes home gabapentin 400mg TID    #Depression/Anxiety  - takes home duloxetine 60mg qD  - takes home Bupropion SR 150mg qD    #PPX/dispo  - lovenox  - PT/OT/Physiatry pending 73yo woman PMH of aortic stenosis, hypertension, diabetes, CORTEZ, hypothyroidism, former smoker (quit 1996; still occasionally vapes medical marijuana) presenting with 1 day of R hand weakness and R leg weakness with additional dysarthria starting around 3pm on day of admission, found to have severe stenosis of L M1 and admitted to stroke unit.  Etiology likely ICAD given risk factors and severe L M1 stenosis correlating with symptoms.      #Acute Infarct  - s/p DAPT load  - c/w ASA + Plavix daily  - atorvastatin 80mg qD  - SBP goal 140-220  - NS 50cc overnight for BP control  - MRI non-con pending  - TTE pending  - stroke core measures pending    #HTN  - SBP goal 140-220  - holding home lisinopril 20/25 mg BID    #Hypothyroid  - c/w home Synthroid 150mcg qD    #DM w/ neuropathy  - A1c pending  - takes home Humalog 75/25 pen   - mISS  - takes home gabapentin 400mg TID    #Depression/Anxiety  - takes home duloxetine 60mg qD  - takes home Bupropion SR 150mg qD    #PPX/dispo  - lovenox  - PT/OT/Physiatry pending

## 2025-03-20 NOTE — H&P ADULT - HISTORY OF PRESENT ILLNESS
75yo woman PMH of aortic stenosis, hypertension, diabetes, CORTEZ, hypothyroidism, former smoker (quit 1996; still occasionally vapes medical marijuana), disconjugate gaze at baseline, noticed R hand clumsiness the day before admission, was having difficulty gripping things and when she tried to write, her writing was "like chickenscratch."  She also noticed she was having a harder time picking up her R foot.  The following day she went to a doctor who noticed she had slurred speech.  Came to ED as stroke code.  NIH 1 for dysarthria with additional R hand weakness and mild R leg weakness found to have near complete stenosis of left M1 on CTA.  Baseline mRS 0.      CTH:  hypodensity in the right caudate head with small calcifications faintly evident on the last CT, more prominent now and more calcified.; hypodensity in the brainstem possibly artifact.  CTP:  negative  CTAs:  Near complete stenosis of the left M1 MCA with normal flow seen distally.  Moderate focal stenosis of the A2 segment of the right DIVINE.   73yo woman PMH of aortic stenosis, hypertension, diabetes, CORTEZ, hypothyroidism, former smoker (quit 1996; still occasionally vapes medical marijuana), disconjugate gaze at baseline, noticed R hand clumsiness the day before admission, was having difficulty gripping things and when she tried to write, her writing was "like chicken scratch."  She also noticed she was having a harder time picking up her R foot.  The following day she went to a doctor who noticed she had slurred speech.  Came to ED as stroke code.  NIH 1 for dysarthria with additional R hand weakness and mild R leg weakness found to have near complete stenosis of left M1 on CTA.  Baseline mRS 0.      CTH:  hypodensity in the right caudate head with small calcifications faintly evident on the last CT, more prominent now and more calcified.; hypodensity in the brainstem possibly artifact.  CTP:  negative  CTAs:  Near complete stenosis of the left M1 MCA with normal flow seen distally.  Moderate focal stenosis of the A2 segment of the right DIVINE.

## 2025-03-20 NOTE — ED PROVIDER NOTE - OBJECTIVE STATEMENT
74yoF w. PMH of Aortic stenosis, hypertension, diabetes, CORTEZ, previous stroke, presents to the ED due to weakness to right side of the face, right arm and right leg that started at 3 PM.

## 2025-03-21 ENCOUNTER — RESULT REVIEW (OUTPATIENT)
Age: 75
End: 2025-03-21

## 2025-03-21 LAB
A1C WITH ESTIMATED AVERAGE GLUCOSE RESULT: 7.9 % — HIGH (ref 4–5.6)
ALBUMIN SERPL ELPH-MCNC: 4 G/DL — SIGNIFICANT CHANGE UP (ref 3.5–5.2)
ALP SERPL-CCNC: 137 U/L — HIGH (ref 30–115)
ALT FLD-CCNC: 15 U/L — SIGNIFICANT CHANGE UP (ref 0–41)
ANION GAP SERPL CALC-SCNC: 13 MMOL/L — SIGNIFICANT CHANGE UP (ref 7–14)
AST SERPL-CCNC: 25 U/L — SIGNIFICANT CHANGE UP (ref 0–41)
BASOPHILS # BLD AUTO: 0.01 K/UL — SIGNIFICANT CHANGE UP (ref 0–0.2)
BASOPHILS NFR BLD AUTO: 0.3 % — SIGNIFICANT CHANGE UP (ref 0–1)
BILIRUB SERPL-MCNC: 0.4 MG/DL — SIGNIFICANT CHANGE UP (ref 0.2–1.2)
BUN SERPL-MCNC: 30 MG/DL — HIGH (ref 10–20)
CALCIUM SERPL-MCNC: 9.2 MG/DL — SIGNIFICANT CHANGE UP (ref 8.4–10.5)
CHLORIDE SERPL-SCNC: 105 MMOL/L — SIGNIFICANT CHANGE UP (ref 98–110)
CHOLEST SERPL-MCNC: 152 MG/DL — SIGNIFICANT CHANGE UP
CO2 SERPL-SCNC: 24 MMOL/L — SIGNIFICANT CHANGE UP (ref 17–32)
CREAT SERPL-MCNC: 1.9 MG/DL — HIGH (ref 0.7–1.5)
EGFR: 27 ML/MIN/1.73M2 — LOW
EGFR: 27 ML/MIN/1.73M2 — LOW
EOSINOPHIL # BLD AUTO: 0.1 K/UL — SIGNIFICANT CHANGE UP (ref 0–0.7)
EOSINOPHIL NFR BLD AUTO: 3.2 % — SIGNIFICANT CHANGE UP (ref 0–8)
ESTIMATED AVERAGE GLUCOSE: 180 MG/DL — HIGH (ref 68–114)
GLUCOSE BLDC GLUCOMTR-MCNC: 156 MG/DL — HIGH (ref 70–99)
GLUCOSE BLDC GLUCOMTR-MCNC: 190 MG/DL — HIGH (ref 70–99)
GLUCOSE BLDC GLUCOMTR-MCNC: 192 MG/DL — HIGH (ref 70–99)
GLUCOSE BLDC GLUCOMTR-MCNC: 261 MG/DL — HIGH (ref 70–99)
GLUCOSE BLDC GLUCOMTR-MCNC: 348 MG/DL — HIGH (ref 70–99)
GLUCOSE BLDC GLUCOMTR-MCNC: 351 MG/DL — HIGH (ref 70–99)
GLUCOSE SERPL-MCNC: 203 MG/DL — HIGH (ref 70–99)
HCT VFR BLD CALC: 28.8 % — LOW (ref 37–47)
HDLC SERPL-MCNC: 50 MG/DL — LOW
HGB BLD-MCNC: 9.6 G/DL — LOW (ref 12–16)
IMM GRANULOCYTES NFR BLD AUTO: 0.6 % — HIGH (ref 0.1–0.3)
LIPID PNL WITH DIRECT LDL SERPL: 78 MG/DL — SIGNIFICANT CHANGE UP
LYMPHOCYTES # BLD AUTO: 1.23 K/UL — SIGNIFICANT CHANGE UP (ref 1.2–3.4)
LYMPHOCYTES # BLD AUTO: 39.3 % — SIGNIFICANT CHANGE UP (ref 20.5–51.1)
MAGNESIUM SERPL-MCNC: 1.6 MG/DL — LOW (ref 1.8–2.4)
MCHC RBC-ENTMCNC: 33.3 G/DL — SIGNIFICANT CHANGE UP (ref 32–37)
MCHC RBC-ENTMCNC: 37.8 PG — HIGH (ref 27–31)
MCV RBC AUTO: 113.4 FL — HIGH (ref 81–99)
MONOCYTES # BLD AUTO: 0.4 K/UL — SIGNIFICANT CHANGE UP (ref 0.1–0.6)
MONOCYTES NFR BLD AUTO: 12.8 % — HIGH (ref 1.7–9.3)
NEUTROPHILS # BLD AUTO: 1.37 K/UL — LOW (ref 1.4–6.5)
NEUTROPHILS NFR BLD AUTO: 43.8 % — SIGNIFICANT CHANGE UP (ref 42.2–75.2)
NON HDL CHOLESTEROL: 102 MG/DL — SIGNIFICANT CHANGE UP
NRBC BLD AUTO-RTO: 0 /100 WBCS — SIGNIFICANT CHANGE UP (ref 0–0)
PA ADP PRP-ACNC: 239 PRU — SIGNIFICANT CHANGE UP (ref 182–335)
PHOSPHATE SERPL-MCNC: 3.9 MG/DL — SIGNIFICANT CHANGE UP (ref 2.1–4.9)
PLATELET # BLD AUTO: 173 K/UL — SIGNIFICANT CHANGE UP (ref 130–400)
PMV BLD: 9.5 FL — SIGNIFICANT CHANGE UP (ref 7.4–10.4)
POTASSIUM SERPL-MCNC: 4.8 MMOL/L — SIGNIFICANT CHANGE UP (ref 3.5–5)
POTASSIUM SERPL-SCNC: 4.8 MMOL/L — SIGNIFICANT CHANGE UP (ref 3.5–5)
PROT SERPL-MCNC: 6 G/DL — SIGNIFICANT CHANGE UP (ref 6–8)
RBC # BLD: 2.54 M/UL — LOW (ref 4.2–5.4)
RBC # FLD: 14.6 % — HIGH (ref 11.5–14.5)
SODIUM SERPL-SCNC: 142 MMOL/L — SIGNIFICANT CHANGE UP (ref 135–146)
TRIGL SERPL-MCNC: 139 MG/DL — SIGNIFICANT CHANGE UP
TROPONIN T, HIGH SENSITIVITY RESULT: 68 NG/L — CRITICAL HIGH (ref 6–13)
TSH SERPL-MCNC: 1.08 UIU/ML — SIGNIFICANT CHANGE UP (ref 0.27–4.2)
WBC # BLD: 3.13 K/UL — LOW (ref 4.8–10.8)
WBC # FLD AUTO: 3.13 K/UL — LOW (ref 4.8–10.8)

## 2025-03-21 PROCEDURE — 99232 SBSQ HOSP IP/OBS MODERATE 35: CPT

## 2025-03-21 PROCEDURE — 93306 TTE W/DOPPLER COMPLETE: CPT | Mod: 26

## 2025-03-21 PROCEDURE — 93010 ELECTROCARDIOGRAM REPORT: CPT

## 2025-03-21 PROCEDURE — 70551 MRI BRAIN STEM W/O DYE: CPT | Mod: 26

## 2025-03-21 PROCEDURE — 99223 1ST HOSP IP/OBS HIGH 75: CPT

## 2025-03-21 RX ORDER — MAGNESIUM SULFATE 500 MG/ML
2 SYRINGE (ML) INJECTION
Refills: 0 | Status: COMPLETED | OUTPATIENT
Start: 2025-03-21 | End: 2025-03-21

## 2025-03-21 RX ORDER — TICAGRELOR 90 MG/1
90 TABLET ORAL
Refills: 0 | Status: DISCONTINUED | OUTPATIENT
Start: 2025-03-21 | End: 2025-03-28

## 2025-03-21 RX ORDER — GABAPENTIN 400 MG/1
1 CAPSULE ORAL
Refills: 0 | DISCHARGE

## 2025-03-21 RX ORDER — FINASTERIDE 1 MG/1
5 TABLET, FILM COATED ORAL AT BEDTIME
Refills: 0 | Status: DISCONTINUED | OUTPATIENT
Start: 2025-03-21 | End: 2025-03-28

## 2025-03-21 RX ORDER — SODIUM CHLORIDE 9 G/1000ML
1000 INJECTION, SOLUTION INTRAVENOUS
Refills: 0 | Status: DISCONTINUED | OUTPATIENT
Start: 2025-03-21 | End: 2025-03-22

## 2025-03-21 RX ORDER — ACETAMINOPHEN 500 MG/5ML
650 LIQUID (ML) ORAL EVERY 6 HOURS
Refills: 0 | Status: DISCONTINUED | OUTPATIENT
Start: 2025-03-21 | End: 2025-03-28

## 2025-03-21 RX ORDER — GABAPENTIN 400 MG/1
400 CAPSULE ORAL
Refills: 0 | Status: DISCONTINUED | OUTPATIENT
Start: 2025-03-21 | End: 2025-03-21

## 2025-03-21 RX ORDER — BUPROPION HYDROBROMIDE 522 MG/1
150 TABLET, EXTENDED RELEASE ORAL DAILY
Refills: 0 | Status: DISCONTINUED | OUTPATIENT
Start: 2025-03-21 | End: 2025-03-28

## 2025-03-21 RX ORDER — GABAPENTIN 400 MG/1
100 CAPSULE ORAL THREE TIMES A DAY
Refills: 0 | Status: DISCONTINUED | OUTPATIENT
Start: 2025-03-21 | End: 2025-03-28

## 2025-03-21 RX ADMIN — ENOXAPARIN SODIUM 40 MILLIGRAM(S): 100 INJECTION SUBCUTANEOUS at 22:07

## 2025-03-21 RX ADMIN — INSULIN LISPRO 10: 100 INJECTION, SOLUTION INTRAVENOUS; SUBCUTANEOUS at 12:49

## 2025-03-21 RX ADMIN — BUPROPION HYDROBROMIDE 150 MILLIGRAM(S): 522 TABLET, EXTENDED RELEASE ORAL at 15:42

## 2025-03-21 RX ADMIN — GABAPENTIN 100 MILLIGRAM(S): 400 CAPSULE ORAL at 22:06

## 2025-03-21 RX ADMIN — GABAPENTIN 100 MILLIGRAM(S): 400 CAPSULE ORAL at 15:41

## 2025-03-21 RX ADMIN — DULOXETINE 60 MILLIGRAM(S): 20 CAPSULE, DELAYED RELEASE ORAL at 12:56

## 2025-03-21 RX ADMIN — CLOPIDOGREL BISULFATE 75 MILLIGRAM(S): 75 TABLET, FILM COATED ORAL at 12:53

## 2025-03-21 RX ADMIN — Medication 15 UNIT(S): at 18:34

## 2025-03-21 RX ADMIN — ATORVASTATIN CALCIUM 80 MILLIGRAM(S): 80 TABLET, FILM COATED ORAL at 22:06

## 2025-03-21 RX ADMIN — Medication 150 MICROGRAM(S): at 05:18

## 2025-03-21 RX ADMIN — Medication 25 GRAM(S): at 18:21

## 2025-03-21 RX ADMIN — FINASTERIDE 5 MILLIGRAM(S): 1 TABLET, FILM COATED ORAL at 22:06

## 2025-03-21 RX ADMIN — Medication 81 MILLIGRAM(S): at 12:53

## 2025-03-21 RX ADMIN — INSULIN LISPRO 2: 100 INJECTION, SOLUTION INTRAVENOUS; SUBCUTANEOUS at 16:27

## 2025-03-21 RX ADMIN — Medication 500 MILLILITER(S): at 06:15

## 2025-03-21 RX ADMIN — Medication 25 GRAM(S): at 16:02

## 2025-03-21 RX ADMIN — SODIUM CHLORIDE 75 MILLILITER(S): 9 INJECTION, SOLUTION INTRAVENOUS at 16:02

## 2025-03-21 RX ADMIN — Medication 250 MILLILITER(S): at 01:46

## 2025-03-21 RX ADMIN — TICAGRELOR 90 MILLIGRAM(S): 90 TABLET ORAL at 22:06

## 2025-03-21 NOTE — PHYSICAL THERAPY INITIAL EVALUATION ADULT - GAIT TRAINING, PT EVAL
Goal: pt will ambulate w/ rolling walker 100 feet independent by discharge to facilitate return to PLOF.

## 2025-03-21 NOTE — PROGRESS NOTE ADULT - ASSESSMENT
75yo woman PMH of aortic stenosis, hypertension, diabetes, CORTEZ, hypothyroidism, former smoker (quit 1996; still occasionally vapes medical marijuana) presenting with 1 day of R hand and leg weakness plus dysarthria, CTH with hypodensity in the right caudate head with small calcifications faintly evident on the last CT, CTA with near occlusion of left M1 MCA with moderate focal stenosis of the A2 segment of the right DIVINE, MRH confirms stroke subcortical L MCA territory suspect cardioembolic. Patient already taking plavix daily at baseline per primary care provider. PRU 3/21 234 suggesting plavix nonresponder.    Subcortical L MCA territory suspect cardioembolic:  HTN:  symptoms improved,   Continue DAPT and Lipitor.   EP plan for loop recorder   SBP goal: 120-160, allow permissive HTN.     CKD stage 3-4  Cr 1.9, s/p IV contrast, start on RL 75 cc for 24hrs    Hypothyroid Synthroid 150mcg daily     DM w/ neuropathy  A1c 7.9  holding home Humalog 75/25 twice daily     sciatica  diabetic neuropathy  gabapentin 400mg twice daily (home dose)   duloxetine 60mg daily     Depression/Anxiety  Bupropion SR 150mg daily > interchange to bupropion XL 150mg daily     CORTEZ  follow up pulm outpatient for better fitting CPAP  - supplemental O2 at night

## 2025-03-21 NOTE — PROGRESS NOTE ADULT - ASSESSMENT
75yo woman PMH of aortic stenosis, hypertension, diabetes, CORTEZ, hypothyroidism, former smoker (quit 1996; still occasionally vapes medical marijuana) presenting with 1 day of R hand weakness and R leg weakness with additional dysarthria starting around 3pm on day of admission, found to have severe stenosis of L M1 and admitted to stroke unit.  Etiology likely ICAD given risk factors and severe L M1 stenosis correlating with symptoms.      #Acute Infarct  - s/p DAPT load  - c/w ASA + Plavix daily  - atorvastatin 80mg qD  - SBP goal 140-220  - NS 50cc overnight for BP control  - MRI non-con pending  - TTE pending  - stroke core measures pending    #HTN  - SBP goal 140-220  - holding home lisinopril 20/25 mg BID    #Hypothyroid  - c/w home Synthroid 150mcg qD    #DM w/ neuropathy  - A1c pending  - takes home Humalog 75/25 pen   - mISS  - takes home gabapentin 400mg TID    #Depression/Anxiety  - takes home duloxetine 60mg qD  - takes home Bupropion SR 150mg qD    #PPX/dispo  - lovenox  - PT/OT/Physiatry pending   75yo woman PMH of aortic stenosis, hypertension, diabetes, CORTEZ, hypothyroidism, former smoker (quit 1996; still occasionally vapes medical marijuana) presenting with 1 day of R hand and leg weakness plus dysarthria, CTH with hypodensity in the right caudate head with small calcifications faintly evident on the last CT, CTA with near occlusion of left M1 MCA with moderate focal stenosis of the A2 segment of the right DIVINE, MRH confirms stroke subcortical L MCA territory suspect cardioembolic. Patient already taking plavix daily at baseline per primary care provider. PRU 3/21 234 suggesting plavix nonresponder.    #subcortical L MCA territory suspect cardioembolic  #HTN  - s/p DAPT load  - EP consult for ILR  - core measures: A1C, LDL, TSH  - antiplatelets/anticoagulants: aspirin 81mg daily , clopidogrel 75mg daily > switch to brilinta 90mg twice daily   - antilipemics: atorvastatin 80mg nightly inpatient > discharge on rosuvastatin 40mg nightly   - SBP goal: 120-160  - q4h neuro and vitals checks  - holding home lisinopril- hydrochlorothiazide 20/25 mg twice daily     #CKD baseline 2.3-3.4  - Cr improved from previous  - follow up hospitalist recommendations     #CORTEZ  - follow up pulm outpatient for better fitting CPAP  - supplemental O2 at night    #Hypothyroid  - c/w home Synthroid 150mcg daily     #DM w/ neuropathy  - A1c 7.9  - holding home Humalog 75/25 twice daily   - mISS    #sciatica  #diabetic neuropathy  - gabapentin 400mg twice daily (home dose)   - duloxetine 60mg daily     #hair loss  - continue home finasteride 5mg daily 5mg nightly     #Depression/Anxiety  - Bupropion SR 150mg daily > interchange to bupropion XL 150mg daily     --------------------------------------------------  #prophylactic measures  - DVT prophylaxis: lovenox / SCDs  - GI prophylaxis: not indicated at this time  - Diet: DASH/TLC / consistent carbohydrate  --------------------------------------------------  #disposition -   - PT / OT / Physiatry advises discharge to: pending   - patient planning to discharge to: pending      73yo woman PMH of aortic stenosis, hypertension, diabetes, CORTEZ, hypothyroidism, former smoker (quit 1996; still occasionally vapes medical marijuana) presenting with 1 day of R hand and leg weakness plus dysarthria, CTH with hypodensity in the right caudate head with small calcifications faintly evident on the last CT, CTA with near occlusion of left M1 MCA with moderate focal stenosis of the A2 segment of the right DIVINE, MRH confirms stroke subcortical L MCA territory suspect cardioembolic. Patient already taking plavix daily at baseline per primary care provider. PRU 3/21 234 suggesting plavix nonresponder.    #subcortical L MCA territory suspect cardioembolic  #HTN  - s/p DAPT load  - EP consult for ILR  - antiplatelets/anticoagulants: aspirin 81mg daily , clopidogrel 75mg daily > switch to brilinta 90mg twice daily   - antilipemics: atorvastatin 80mg nightly inpatient > discharge on rosuvastatin 40mg nightly   - SBP goal: 120-160  - q4h neuro and vitals checks  - holding home lisinopril- hydrochlorothiazide 20/25 mg twice daily     #CKD baseline 2.3-3.4  - Cr improved from previous  - 75c LR 24 hours    #CORTEZ  - follow up pulm outpatient for better fitting CPAP  - supplemental O2 at night    #Hypothyroid  - c/w home Synthroid 150mcg daily     #DM w/ neuropathy  - A1c 7.9  - holding home Humalog 75/25 twice daily   - mISS > adjust regimen 3/21 per inpatient insulin requirements    #sciatica  #diabetic neuropathy  - gabapentin 400mg twice daily (home dose) > converted to 100mg three times daily per [facility]  - duloxetine 60mg daily     #hair loss  - finasteride 5mg daily 5mg nightly (home dose)     #Depression/Anxiety  - Bupropion SR 150mg daily > interchange to bupropion XL 150mg daily     --------------------------------------------------  #prophylactic measures  - DVT prophylaxis: lovenox / SCDs  - GI prophylaxis: not indicated at this time  - Diet: DASH/TLC / consistent carbohydrate  --------------------------------------------------  #disposition -   - PT / OT / Physiatry advises discharge to: pending   - patient planning to discharge to: pending      73yo woman PMH of aortic stenosis, hypertension, diabetes, CORTEZ, hypothyroidism, former smoker (quit 1996; still occasionally vapes medical marijuana) presenting with 1 day of R hand and leg weakness plus dysarthria, CTH with hypodensity in the right caudate head with small calcifications faintly evident on the last CT, CTA with near occlusion of left M1 MCA with moderate focal stenosis of the A2 segment of the right DIVINE, MRH confirms stroke subcortical L MCA territory suspect cardioembolic. Patient already taking plavix daily at baseline per primary care provider. PRU 3/21 234 suggesting plavix nonresponder.    #subcortical L MCA territory suspect cardioembolic  #HTN  - s/p DAPT load  - EP consult for ILR  - antiplatelets/anticoagulants: aspirin 81mg daily , clopidogrel 75mg daily > switch to brilinta 90mg twice daily   - antilipemics: atorvastatin 80mg nightly inpatient > discharge on rosuvastatin 40mg nightly   - SBP goal: 120-160  - q4h neuro and vitals checks  - holding home lisinopril- hydrochlorothiazide 20/25 mg twice daily     #CKD baseline 2.3-3.4  - Cr improved from previous  - 75c LR 24 hours    #CORTEZ  - follow up pulm outpatient for better fitting CPAP  - supplemental O2 at night    #Hypothyroid  - c/w home Synthroid 150mcg daily     #DM w/ neuropathy  - A1c 7.9  - holding home Humalog 75/25 ~45U twice daily   - hospitalist advising converting to insulin NPH 30U twice daily > will start on 15U twice daily and increase as tolerated/required  - insulin corrective sliding scale     #sciatica  #diabetic neuropathy  - gabapentin 400mg twice daily (home dose) > converted to 100mg three times daily per CrCl  - duloxetine 60mg daily     #hair loss  - finasteride 5mg daily 5mg nightly (home dose)     #Depression/Anxiety  - Bupropion SR 150mg daily > interchange to bupropion XL 150mg daily     --------------------------------------------------  #prophylactic measures  - DVT prophylaxis: lovenox / SCDs  - GI prophylaxis: not indicated at this time  - Diet: DASH/TLC / consistent carbohydrate  --------------------------------------------------  #disposition -   - PT / OT / Physiatry advises discharge to: pending   - patient planning to discharge to: pending

## 2025-03-21 NOTE — PHYSICAL THERAPY INITIAL EVALUATION ADULT - LEVEL OF INDEPENDENCE: SUPINE/SIT, REHAB EVAL
Pt arrived at floor with transport around 18:00pm. Wife at bedside. Assumed care of pt at shift change. A/Ox4, discussed plan of care. Pt on room air. Complains RUQ pain. PRN oxycodone given. Ns started and running at 83/hr. Welcome packaged provided to pt.  Bed alarm applied.   All needs met at this time. Bed in lowest position, treaded socks on, personal belongings and call light within reach, instructed to call for any assistance, hourly rounding in place.   independent

## 2025-03-21 NOTE — CHART NOTE - NSCHARTNOTEFT_GEN_A_CORE
Neurovascular:  Patient with SBP goal 140-180, when deep sleeping , 119.  Given 250 ml NS x2 (has AS). NIHSS stable.

## 2025-03-21 NOTE — CONSULT NOTE ADULT - ASSESSMENT
IMPRESSION: Rehab of L stroke / R HP, dysarthria / aortic stenosis, hypertension, diabetes, CORTEZ, hypothyroidism    PRECAUTIONS: [   ] Cardiac  [   ] Respiratory  [   ] Seizures [   ] Contact Isolation  [   ] Droplet Isolation  [   ] Other    Weight Bearing Status:     RECOMMENDATION:    Out of Bed to Chair     DVT/Decubiti Prophylaxis    REHAB PLAN:     [  x  ] Bedside P/T 3-5 times a week   [ x   ]   Bedside O/T  2-3 times a week             [ x   ] Speech Therapy               [    ]  No Rehab Therapy Indicated   Conditioning/ROM                                    ADL  Bed Mobility                                               Conditioning/ROM  Transfers                                                     Bed Mobility  Sitting /Standing Balance                         Transfers                                        Gait Training                                               Sitting/Standing Balance  Stair Training [   ]Applicable                    Home equipment Eval                                                                        Splinting  [   ] Only      GOALS:   ADL   [  x  ]   Independent                    Transfers  [  x  ] Independent                          Ambulation  [x    ] Independent     [ x    ] With device                            [    ]  CG                                                         [    ]  CG                                                                  [    ] CG                            [    ] Min A                                                   [    ] Min A                                                              [    ] Min  A          DISCHARGE PLAN:   [    ]  Good candidate for Intensive Rehabilitation/Hospital based                                             Will tolerate 3hrs Intensive Rehab Daily                                       [     ]  Short Term Rehab in Skilled Nursing Facility                                       [     ]  Home with Outpatient or  services                                         [   x  ]  Possible Candidate for Intensive Hospital based Rehab

## 2025-03-21 NOTE — PROGRESS NOTE ADULT - SUBJECTIVE AND OBJECTIVE BOX
GASTON DURAN  74y  Female      Patient is a 74y old  Female who presents with a chief complaint of stroke (21 Mar 2025 12:46)      INTERVAL HPI/OVERNIGHT EVENTS:  She feels ok, speech is more clear, right side weakness improved.   Vital Signs Last 24 Hrs  T(C): 36.8 (21 Mar 2025 11:43), Max: 36.8 (21 Mar 2025 11:43)  T(F): 98.3 (21 Mar 2025 11:43), Max: 98.3 (21 Mar 2025 11:43)  HR: 89 (21 Mar 2025 11:43) (73 - 89)  BP: 131/68 (21 Mar 2025 11:43) (111/60 - 163/90)  BP(mean): 104 (20 Mar 2025 21:00) (104 - 104)  RR: 18 (21 Mar 2025 11:43) (16 - 18)  SpO2: 100% (21 Mar 2025 11:43) (95% - 100%)    Parameters below as of 21 Mar 2025 11:43  Patient On (Oxygen Delivery Method): nasal cannula  O2 Flow (L/min): 2              Consultant(s) Notes Reviewed:  [x ] YES  [ ] NO          MEDICATIONS  (STANDING):  aspirin  chewable 81 milliGRAM(s) Oral daily  atorvastatin 80 milliGRAM(s) Oral at bedtime  buPROPion XL (24-Hour) . 150 milliGRAM(s) Oral daily  dextrose 5%. 1000 milliLiter(s) (50 mL/Hr) IV Continuous <Continuous>  dextrose 5%. 1000 milliLiter(s) (100 mL/Hr) IV Continuous <Continuous>  dextrose 50% Injectable 25 Gram(s) IV Push once  dextrose 50% Injectable 12.5 Gram(s) IV Push once  dextrose 50% Injectable 25 Gram(s) IV Push once  DULoxetine 60 milliGRAM(s) Oral daily  enoxaparin Injectable 40 milliGRAM(s) SubCutaneous every 24 hours  finasteride 5 milliGRAM(s) Oral at bedtime  gabapentin 100 milliGRAM(s) Oral three times a day  glucagon  Injectable 1 milliGRAM(s) IntraMuscular once  insulin lispro (ADMELOG) corrective regimen sliding scale   SubCutaneous three times a day before meals  levothyroxine 150 MICROGram(s) Oral daily  sodium chloride 0.9%. 1000 milliLiter(s) (50 mL/Hr) IV Continuous <Continuous>  ticagrelor 90 milliGRAM(s) Oral two times a day    MEDICATIONS  (PRN):  acetaminophen     Tablet .. 650 milliGRAM(s) Oral every 6 hours PRN Temp greater or equal to 38C (100.4F), Mild Pain (1 - 3)  dextrose Oral Gel 15 Gram(s) Oral once PRN Blood Glucose LESS THAN 70 milliGRAM(s)/deciliter      LABS                          9.6    3.13  )-----------( 173      ( 21 Mar 2025 05:48 )             28.8     03-21    142  |  105  |  30[H]  ----------------------------<  203[H]  4.8   |  24  |  1.9[H]    Ca    9.2      21 Mar 2025 05:48  Phos  3.9     03-21  Mg     1.6     03-21    TPro  6.0  /  Alb  4.0  /  TBili  0.4  /  DBili  x   /  AST  25  /  ALT  15  /  AlkPhos  137[H]  03-21      Urinalysis Basic - ( 21 Mar 2025 05:48 )    Color: x / Appearance: x / SG: x / pH: x  Gluc: 203 mg/dL / Ketone: x  / Bili: x / Urobili: x   Blood: x / Protein: x / Nitrite: x   Leuk Esterase: x / RBC: x / WBC x   Sq Epi: x / Non Sq Epi: x / Bacteria: x      PT/INR - ( 20 Mar 2025 18:15 )   PT: 11.10 sec;   INR: 0.94 ratio         PTT - ( 20 Mar 2025 18:15 )  PTT:32.4 sec  Lactate Trend        CAPILLARY BLOOD GLUCOSE      POCT Blood Glucose.: 351 mg/dL (21 Mar 2025 12:48)        RADIOLOGY & ADDITIONAL TESTS:    Imaging Personally Reviewed:  [ ] YES  [ ] NO    HEALTH ISSUES - PROBLEM Dx:          PHYSICAL EXAM:  GENERAL: NAD, well-developed.  HEAD:  Atraumatic, Normocephalic.  EYES: EOMI, PERRLA, conjunctiva and sclera clear.  NECK: Supple, No JVD.  CHEST/LUNG: Clear to auscultation bilaterally; No wheeze.  HEART: Regular rate and rhythm; S1 S2.   ABDOMEN: Soft, Nontender, Nondistended; Bowel sounds present.  EXTREMITIES:  2+ Peripheral Pulses, No clubbing, cyanosis, or edema.  PSYCH: AAOx3.  NEUROLOGY: non-focal.  SKIN: No rashes or lesions.

## 2025-03-21 NOTE — PHYSICAL THERAPY INITIAL EVALUATION ADULT - PERTINENT HX OF CURRENT PROBLEM, REHAB EVAL
75yo woman PMH of aortic stenosis, hypertension, diabetes, CORTEZ, hypothyroidism, former smoker (quit 1996; still occasionally vapes medical marijuana) presenting with 1 day of R hand weakness and R leg weakness with additional dysarthria starting around 3pm on day of admission, found to have severe stenosis of L M1 and admitted to stroke unit.  Etiology likely ICAD given risk factors and severe L M1 stenosis correlating with symptoms.

## 2025-03-21 NOTE — CONSULT NOTE ADULT - SUBJECTIVE AND OBJECTIVE BOX
HPI:  73yo woman PMH of aortic stenosis, hypertension, diabetes, CORTEZ, hypothyroidism, former smoker (quit 1996; still occasionally vapes medical marijuana), disconjugate gaze at baseline, noticed R hand clumsiness the day before admission, was having difficulty gripping things and when she tried to write, her writing was "like chicken scratch."  She also noticed she was having a harder time picking up her R foot.  The following day she went to a doctor who noticed she had slurred speech.  Came to ED as stroke code.  NIH 1 for dysarthria with additional R hand weakness and mild R leg weakness found to have near complete stenosis of left M1 on CTA.  Baseline mRS 0.      CTH:  hypodensity in the right caudate head with small calcifications faintly evident on the last CT, more prominent now and more calcified.; hypodensity in the brainstem possibly artifact.  CTP:  negative  CTAs:  Near complete stenosis of the left M1 MCA with normal flow seen distally.  Moderate focal stenosis of the A2 segment of the right DIVINE.     < from: MR Head No Cont (03.21.25 @ 08:08) >  IMPRESSION:    1.  Acute left corona radiata/basal ganglia/p posterior limb/internal   capsule infarct    2.  Chronic right corona radiata infarct    3.  Moderate microvascular ischemic changes.    4.  Focal area left MCA correspond to severe stenosis.    < end of copied text >      Subcortical L MCA territory suspect cardioembolic:  HTN:  symptoms improved,   Continue DAPT and Lipitor.   EP plan for loop recorder   SBP goal: 120-160, allow permissive HTN.       PAST MEDICAL & SURGICAL HISTORY:  HTN (hypertension)      History of TIAs      Hypothyroid      Arthritis      H/O total knee replacement          Hospital Course:    TODAY'S SUBJECTIVE & REVIEW OF SYMPTOMS:     Constitutional WNL   Cardio WNL   Resp WNL   GI WNL  Heme WNL  Endo WNL  Skin WNL  MSK WNL  Neuro right side weakness, dysarthria   Cognitive WNL  Psych WNL      MEDICATIONS  (STANDING):  aspirin  chewable 81 milliGRAM(s) Oral daily  atorvastatin 80 milliGRAM(s) Oral at bedtime  buPROPion XL (24-Hour) . 150 milliGRAM(s) Oral daily  dextrose 5%. 1000 milliLiter(s) (50 mL/Hr) IV Continuous <Continuous>  dextrose 5%. 1000 milliLiter(s) (100 mL/Hr) IV Continuous <Continuous>  dextrose 50% Injectable 25 Gram(s) IV Push once  dextrose 50% Injectable 12.5 Gram(s) IV Push once  dextrose 50% Injectable 25 Gram(s) IV Push once  DULoxetine 60 milliGRAM(s) Oral daily  enoxaparin Injectable 40 milliGRAM(s) SubCutaneous every 24 hours  finasteride 5 milliGRAM(s) Oral at bedtime  gabapentin 100 milliGRAM(s) Oral three times a day  glucagon  Injectable 1 milliGRAM(s) IntraMuscular once  insulin lispro (ADMELOG) corrective regimen sliding scale   SubCutaneous three times a day before meals  lactated ringers. 1000 milliLiter(s) (75 mL/Hr) IV Continuous <Continuous>  levothyroxine 150 MICROGram(s) Oral daily  magnesium sulfate  IVPB 2 Gram(s) IV Intermittent every 2 hours  sodium chloride 0.9%. 1000 milliLiter(s) (50 mL/Hr) IV Continuous <Continuous>  ticagrelor 90 milliGRAM(s) Oral two times a day    MEDICATIONS  (PRN):  acetaminophen     Tablet .. 650 milliGRAM(s) Oral every 6 hours PRN Temp greater or equal to 38C (100.4F), Mild Pain (1 - 3)  dextrose Oral Gel 15 Gram(s) Oral once PRN Blood Glucose LESS THAN 70 milliGRAM(s)/deciliter      FAMILY HISTORY:      Allergies    clindamycin (Rash)    Intolerances        SOCIAL HISTORY:    [  ] Etoh  [  ] Smoking  [  ] Substance abuse     Home Environment:  [ x  ] Home Alone  [   ] Lives with Family  [   ] Home Health Aid    Dwelling:  [ x  ] Apartment  [   ] Private House  [   ] Adult Home  [   ] Skilled Nursing Facility      [   ] Short Term  [   ] Long Term  [  x ] Stairs       Elevator [x   ]    FUNCTIONAL STATUS PTA: (Check all that apply)  Ambulation: [   x ]Independent    [   ] Dependent     [   ] Non-Ambulatory  Assistive Device: [   ] SA Cane  [   ]  Q Cane  [   ] Walker  [   ]  Wheelchair  ADL : [  x ] Independent  [    ]  Dependent       Vital Signs Last 24 Hrs  T(C): 36.6 (21 Mar 2025 15:38), Max: 36.8 (21 Mar 2025 11:43)  T(F): 97.8 (21 Mar 2025 15:38), Max: 98.3 (21 Mar 2025 11:43)  HR: 83 (21 Mar 2025 15:38) (73 - 89)  BP: 148/58 (21 Mar 2025 15:38) (111/60 - 163/90)  BP(mean): 88 (21 Mar 2025 15:38) (88 - 104)  RR: 18 (21 Mar 2025 15:38) (16 - 18)  SpO2: 95% (21 Mar 2025 15:38) (95% - 100%)    Parameters below as of 21 Mar 2025 15:38  Patient On (Oxygen Delivery Method): nasal cannula          PHYSICAL EXAM: Awake & Alert  GENERAL: NAD  HEAD:  Normocephalic  CHEST/LUNG: Clear   HEART: S1S2+  ABDOMEN: Soft, Nontender  EXTREMITIES:  no calf tenderness    NERVOUS SYSTEM:  Cranial Nerves 2-12 intact [   ] Abnormal  [ x  ]mild dysarthria   ROM: WFL all extremities [ x  ]  Abnormal [   ]  Motor Strength: WFL all extremities  [   ]  Abnormal [x   ]4/5 right side, 5/5 left side   Sensation: intact to light touch [ x  ] Abnormal [   ]    FUNCTIONAL STATUS:  Bed Mobility: Independent [   ]  Supervision [   ]  Needs Assistance [x   ]  N/A [   ]  Transfers: Independent [   ]  Supervision [   ]  Needs Assistance [   ]  N/A [   ]   Ambulation: Independent [   ]  Supervision [   ]  Needs Assistance [   ]  N/A [   ]  ADL: Independent [   ] Requires Assistance [   ] N/A [   ]      LABS:                        9.6    3.13  )-----------( 173      ( 21 Mar 2025 05:48 )             28.8     03-21    142  |  105  |  30[H]  ----------------------------<  203[H]  4.8   |  24  |  1.9[H]    Ca    9.2      21 Mar 2025 05:48  Phos  3.9     03-21  Mg     1.6     03-21    TPro  6.0  /  Alb  4.0  /  TBili  0.4  /  DBili  x   /  AST  25  /  ALT  15  /  AlkPhos  137[H]  03-21    PT/INR - ( 20 Mar 2025 18:15 )   PT: 11.10 sec;   INR: 0.94 ratio         PTT - ( 20 Mar 2025 18:15 )  PTT:32.4 sec  Urinalysis Basic - ( 21 Mar 2025 05:48 )    Color: x / Appearance: x / SG: x / pH: x  Gluc: 203 mg/dL / Ketone: x  / Bili: x / Urobili: x   Blood: x / Protein: x / Nitrite: x   Leuk Esterase: x / RBC: x / WBC x   Sq Epi: x / Non Sq Epi: x / Bacteria: x        RADIOLOGY & ADDITIONAL STUDIES:

## 2025-03-21 NOTE — OCCUPATIONAL THERAPY INITIAL EVALUATION ADULT - PERTINENT HX OF CURRENT PROBLEM, REHAB EVAL
75yo woman PMH of aortic stenosis, hypertension, diabetes, CORTEZ, hypothyroidism, former smoker (quit 1996; still occasionally vapes medical marijuana), disconjugate gaze at baseline, noticed R hand clumsiness the day before admission, was having difficulty gripping things and when she tried to write, her writing was "like chickenscratch."  She also noticed she was having a harder time picking up her R foot.  The following day she went to a doctor who noticed she had slurred speech.  Came to ED as stroke code.  NIH 1 for dysarthria with additional R hand weakness and mild R leg weakness found to have near complete stenosis of left M1 on CTA.  Baseline mRS 0

## 2025-03-21 NOTE — PHYSICAL THERAPY INITIAL EVALUATION ADULT - ADDITIONAL COMMENTS
pt lives alone in a 4th floor apartment. The elevator was broken prior to admission. pt was staying  in a [facility] house without steps. Unsure of DC plan.  She was independent with a device prior to admission. pt lives alone in a 4th floor apartment. The elevator was broken prior to admission. Upon admission, pt will stay at a friend's house with 5 steps to enter and one flight inside.  She was independent with a device prior to admission.

## 2025-03-21 NOTE — CONSULT NOTE ADULT - SUBJECTIVE AND OBJECTIVE BOX
HPI:  73yo woman PMH of aortic stenosis, hypertension, diabetes, CORTEZ, hypothyroidism, former smoker (quit 1996; still occasionally vapes medical marijuana), disconjugate gaze at baseline, noticed R hand clumsiness the day before admission, was having difficulty gripping things and when she tried to write, her writing was "like chickenscratch."  She also noticed she was having a harder time picking up her R foot.  The following day she went to a doctor who noticed she had slurred speech.  Came to ED as stroke code.  NIH 1 for dysarthria with additional R hand weakness and mild R leg weakness found to have near complete stenosis of left M1 on CTA.  Baseline mRS 0.      CTH:  hypodensity in the right caudate head with small calcifications faintly evident on the last CT, more prominent now and more calcified.; hypodensity in the brainstem possibly artifact.  CTP:  negative  CTAs:  Near complete stenosis of the left M1 MCA with normal flow seen distally.  Moderate focal stenosis of the A2 segment of the right DIVINE.    EP consulted for ILR. denies any palpitations, no chest pain, no sob, no syncope    PAST MEDICAL & SURGICAL HISTORY  HTN (hypertension)    History of TIAs    Hypothyroid    Arthritis    H/O total knee replacement        FAMILY HISTORY:  FAMILY HISTORY:      SOCIAL HISTORY:  []smoker  []Alcohol  []Drug    ALLERGIES:  clindamycin (Rash)      MEDICATIONS:  MEDICATIONS  (STANDING):  aspirin  chewable 81 milliGRAM(s) Oral daily  atorvastatin 80 milliGRAM(s) Oral at bedtime  clopidogrel Tablet 75 milliGRAM(s) Oral daily  dextrose 5%. 1000 milliLiter(s) (50 mL/Hr) IV Continuous <Continuous>  dextrose 5%. 1000 milliLiter(s) (100 mL/Hr) IV Continuous <Continuous>  dextrose 50% Injectable 25 Gram(s) IV Push once  dextrose 50% Injectable 12.5 Gram(s) IV Push once  dextrose 50% Injectable 25 Gram(s) IV Push once  DULoxetine 60 milliGRAM(s) Oral daily  enoxaparin Injectable 40 milliGRAM(s) SubCutaneous every 24 hours  glucagon  Injectable 1 milliGRAM(s) IntraMuscular once  insulin lispro (ADMELOG) corrective regimen sliding scale   SubCutaneous three times a day before meals  levothyroxine 150 MICROGram(s) Oral daily  sodium chloride 0.9%. 1000 milliLiter(s) (50 mL/Hr) IV Continuous <Continuous>    MEDICATIONS  (PRN):  acetaminophen     Tablet .. 650 milliGRAM(s) Oral every 6 hours PRN Temp greater or equal to 38C (100.4F), Mild Pain (1 - 3)  dextrose Oral Gel 15 Gram(s) Oral once PRN Blood Glucose LESS THAN 70 milliGRAM(s)/deciliter      HOME MEDICATIONS:  Home Medications:  Budeprion  mg/12 hours oral tablet, extended release: 1 orally (24 May 2023 13:50)  clopidogrel 75 mg oral tablet: 1 orally (24 May 2023 13:47)  Cymbalta 60 mg oral delayed release capsule: 1 orally (24 May 2023 13:50)  finasteride 5 mg oral tablet: 1 orally (24 May 2023 13:50)  gabapentin 400 mg oral tablet: 1 tab(s) orally 3 times a day (20 Mar 2025 23:17)  HumaLOG Mix 75/25 Pen subcutaneous suspension: subcutaneous 2 times a day (20 Mar 2025 23:15)  levothyroxine 150 mcg (0.15 mg) oral tablet: 1 orally (24 May 2023 13:50)  lisinopril-hydrochlorothiazide 20 mg-25 mg oral tablet: 1 tab(s) orally 2 times a day (20 Mar 2025 23:16)  rosuvastatin 20 mg oral capsule: 1 orally (24 May 2023 13:50)      VITALS:   T(F): 98.3 (03-21 @ 11:43), Max: 98.3 (03-21 @ 11:43)  HR: 89 (03-21 @ 11:43) (73 - 89)  BP: 131/68 (03-21 @ 11:43) (111/60 - 163/90)  BP(mean): 104 (03-20 @ 21:00) (104 - 104)  RR: 18 (03-21 @ 11:43) (16 - 18)  SpO2: 100% (03-21 @ 11:43) (95% - 100%)    I&O's Summary      REVIEW OF SYSTEMS:  CONSTITUTIONAL: No weakness, fevers or chills  EYES: No visual changes  ENT: No vertigo or throat pain   NECK: No pain or stiffness  RESPIRATORY: No cough, wheezing, hemoptysis; No shortness of breath  CARDIOVASCULAR: No chest pain or palpitations  GASTROINTESTINAL: No abdominal or epigastric pain. No nausea, vomiting, or hematemesis; No diarrhea or constipation. No melena or hematochezia.  GENITOURINARY: No dysuria, frequency or hematuria  NEUROLOGICAL: No numbness or weakness  SKIN: No itching, no rashes  MSK: No pain    PHYSICAL EXAM:  NEURO: patient is awake , alert and oriented  GEN: Not in acute distress  NECK: no thyroid enlargement, no JVD  LUNGS: Clear to auscultation bilaterally   CARDIOVASCULAR: S1/S2 present, RRR , no murmurs or rubs, no carotid bruits,  + PP bilaterally  ABD: Soft, non-tender, non-distended, +BS  EXT: No RAÚL  SKIN: Intact    LABS:                        9.6    3.13  )-----------( 173      ( 21 Mar 2025 05:48 )             28.8     03-21    142  |  105  |  30[H]  ----------------------------<  203[H]  4.8   |  24  |  1.9[H]    Ca    9.2      21 Mar 2025 05:48  Phos  3.9     03-21  Mg     1.6     03-21    TPro  6.0  /  Alb  4.0  /  TBili  0.4  /  DBili  x   /  AST  25  /  ALT  15  /  AlkPhos  137[H]  03-21    PT/INR - ( 20 Mar 2025 18:15 )   PT: 11.10 sec;   INR: 0.94 ratio         PTT - ( 20 Mar 2025 18:15 )  PTT:32.4 sec          Troponin trend:      03-21 Chol 152 LDL -- HDL 50[L] Trig 139      RADIOLOGY:  -CXR:  -TTE:  1. Technically difficult study.   2. Mildly decreased global left ventricular systolic function.   3. LV Ejection Fraction by Lester's Method with a biplane EF of 53 %.   4. Normal left ventricular internal cavity size.  5. Spectral Doppler shows impaired relaxation pattern of left   ventricular myocardial filling (Grade I diastolic dysfunction).   6. Normal right ventricular size and function.   7. Mildly enlarged left atrium.   8. Normal right atrial size.   9. Mild mitral annular calcification.  10. Mild to moderate mitral valve regurgitation.  11. Thickening and calcification of the anterior and posterior mitral   valve leaflets.  12. Mild tricuspid regurgitation.  13. Sclerotic aortic valve with normal opening.     -CCTA:  -STRESS TEST:  -CATHETERIZATION:    ECG: NSR, bifascicular block and LAFB  KS prolongation, PAC    TELEMETRY EVENTS:   HPI:  75yo woman PMH of aortic stenosis, hypertension, diabetes, CORTEZ, hypothyroidism, former smoker (quit 1996; still occasionally vapes medical marijuana), disconjugate gaze at baseline, noticed R hand clumsiness the day before admission, was having difficulty gripping things and when she tried to write, her writing was "like chicken scratch."  She also noticed she was having a harder time picking up her R foot.  The following day she went to a doctor who noticed she had slurred speech.  Came to ED as stroke code.  NIH 1 for dysarthria with additional R hand weakness and mild R leg weakness found to have near complete stenosis of left M1 on CTA.  Baseline mRS 0.    CTH:  hypodensity in the right caudate head with small calcifications faintly evident on the last CT, more prominent now and more calcified.; hypodensity in the brainstem possibly artifact.  CTP:  negative  CTAs:  Near complete stenosis of the left M1 MCA with normal flow seen distally.  Moderate focal stenosis of the A2 segment of the right DIVINE.    EP consulted for ILR. denies any palpitations, no chest pain, no sob, no syncope    PAST MEDICAL & SURGICAL HISTORY  HTN (hypertension)    History of TIAs    Hypothyroid    Arthritis    H/O total knee replacement        FAMILY HISTORY:  FAMILY HISTORY:      SOCIAL HISTORY:  []smoker  []Alcohol  []Drug    ALLERGIES:  clindamycin (Rash)      MEDICATIONS:  MEDICATIONS  (STANDING):  aspirin  chewable 81 milliGRAM(s) Oral daily  atorvastatin 80 milliGRAM(s) Oral at bedtime  clopidogrel Tablet 75 milliGRAM(s) Oral daily  dextrose 5%. 1000 milliLiter(s) (50 mL/Hr) IV Continuous <Continuous>  dextrose 5%. 1000 milliLiter(s) (100 mL/Hr) IV Continuous <Continuous>  dextrose 50% Injectable 25 Gram(s) IV Push once  dextrose 50% Injectable 12.5 Gram(s) IV Push once  dextrose 50% Injectable 25 Gram(s) IV Push once  DULoxetine 60 milliGRAM(s) Oral daily  enoxaparin Injectable 40 milliGRAM(s) SubCutaneous every 24 hours  glucagon  Injectable 1 milliGRAM(s) IntraMuscular once  insulin lispro (ADMELOG) corrective regimen sliding scale   SubCutaneous three times a day before meals  levothyroxine 150 MICROGram(s) Oral daily  sodium chloride 0.9%. 1000 milliLiter(s) (50 mL/Hr) IV Continuous <Continuous>    MEDICATIONS  (PRN):  acetaminophen     Tablet .. 650 milliGRAM(s) Oral every 6 hours PRN Temp greater or equal to 38C (100.4F), Mild Pain (1 - 3)  dextrose Oral Gel 15 Gram(s) Oral once PRN Blood Glucose LESS THAN 70 milliGRAM(s)/deciliter      HOME MEDICATIONS:  Home Medications:  Budeprion  mg/12 hours oral tablet, extended release: 1 orally (24 May 2023 13:50)  clopidogrel 75 mg oral tablet: 1 orally (24 May 2023 13:47)  Cymbalta 60 mg oral delayed release capsule: 1 orally (24 May 2023 13:50)  finasteride 5 mg oral tablet: 1 orally (24 May 2023 13:50)  gabapentin 400 mg oral tablet: 1 tab(s) orally 3 times a day (20 Mar 2025 23:17)  HumaLOG Mix 75/25 Pen subcutaneous suspension: subcutaneous 2 times a day (20 Mar 2025 23:15)  levothyroxine 150 mcg (0.15 mg) oral tablet: 1 orally (24 May 2023 13:50)  lisinopril-hydrochlorothiazide 20 mg-25 mg oral tablet: 1 tab(s) orally 2 times a day (20 Mar 2025 23:16)  rosuvastatin 20 mg oral capsule: 1 orally (24 May 2023 13:50)      VITALS:   T(F): 98.3 (03-21 @ 11:43), Max: 98.3 (03-21 @ 11:43)  HR: 89 (03-21 @ 11:43) (73 - 89)  BP: 131/68 (03-21 @ 11:43) (111/60 - 163/90)  BP(mean): 104 (03-20 @ 21:00) (104 - 104)  RR: 18 (03-21 @ 11:43) (16 - 18)  SpO2: 100% (03-21 @ 11:43) (95% - 100%)    I&O's Summary      REVIEW OF SYSTEMS:  CONSTITUTIONAL: No weakness, fevers or chills  EYES: No visual changes  ENT: No vertigo or throat pain   NECK: No pain or stiffness  RESPIRATORY: No cough, wheezing, hemoptysis; No shortness of breath  CARDIOVASCULAR: No chest pain or palpitations  GASTROINTESTINAL: No abdominal or epigastric pain. No nausea, vomiting, or hematemesis; No diarrhea or constipation. No melena or hematochezia.  GENITOURINARY: No dysuria, frequency or hematuria  NEUROLOGICAL: No numbness or weakness  SKIN: No itching, no rashes  MSK: No pain    PHYSICAL EXAM:  NEURO: patient is awake , alert and oriented  GEN: Not in acute distress  NECK: no thyroid enlargement, no JVD  LUNGS: Clear to auscultation bilaterally   CARDIOVASCULAR: S1/S2 present, RRR , no murmurs or rubs, no carotid bruits,  + PP bilaterally  ABD: Soft, non-tender, non-distended, +BS  EXT: No RAÚL  SKIN: Intact    LABS:                        9.6    3.13  )-----------( 173      ( 21 Mar 2025 05:48 )             28.8     03-21    142  |  105  |  30[H]  ----------------------------<  203[H]  4.8   |  24  |  1.9[H]    Ca    9.2      21 Mar 2025 05:48  Phos  3.9     03-21  Mg     1.6     03-21    TPro  6.0  /  Alb  4.0  /  TBili  0.4  /  DBili  x   /  AST  25  /  ALT  15  /  AlkPhos  137[H]  03-21    PT/INR - ( 20 Mar 2025 18:15 )   PT: 11.10 sec;   INR: 0.94 ratio         PTT - ( 20 Mar 2025 18:15 )  PTT:32.4 sec          Troponin trend:      03-21 Chol 152 LDL -- HDL 50[L] Trig 139      RADIOLOGY:  -CXR:  -TTE:  1. Technically difficult study.   2. Mildly decreased global left ventricular systolic function.   3. LV Ejection Fraction by Lester's Method with a biplane EF of 53 %.   4. Normal left ventricular internal cavity size.  5. Spectral Doppler shows impaired relaxation pattern of left   ventricular myocardial filling (Grade I diastolic dysfunction).   6. Normal right ventricular size and function.   7. Mildly enlarged left atrium.   8. Normal right atrial size.   9. Mild mitral annular calcification.  10. Mild to moderate mitral valve regurgitation.  11. Thickening and calcification of the anterior and posterior mitral   valve leaflets.  12. Mild tricuspid regurgitation.  13. Sclerotic aortic valve with normal opening.     -CCTA:  -STRESS TEST:  -CATHETERIZATION:    ECG: NSR, bifascicular block and LAFB  CO prolongation, PAC    TELEMETRY EVENTS:

## 2025-03-21 NOTE — CONSULT NOTE ADULT - ATTENDING COMMENTS
Based on our literature, we recommend an implantable loop recorder as the 30 day event monitor has been shown to not be as effective for the evaluation of occult AFib for cryptogenic strokes. We discussed the risks/benefits/alternatives, nature of procedure, and follow up care after device is implanted. We also discussed remote monitoring in details. We discussed the risks of bleeding and infection. I answered all questions in detail. Patient expressed understanding of the discussion and would like to proceed with loop recorder implant.

## 2025-03-21 NOTE — PROGRESS NOTE ADULT - SUBJECTIVE AND OBJECTIVE BOX
Neurology Progress Note    Interval History:     Medications:  aspirin  chewable 81 milliGRAM(s) Oral daily  atorvastatin 80 milliGRAM(s) Oral at bedtime  clopidogrel Tablet 75 milliGRAM(s) Oral daily  dextrose 5%. 1000 milliLiter(s) IV Continuous <Continuous>  dextrose 5%. 1000 milliLiter(s) IV Continuous <Continuous>  dextrose 50% Injectable 25 Gram(s) IV Push once  dextrose 50% Injectable 12.5 Gram(s) IV Push once  dextrose 50% Injectable 25 Gram(s) IV Push once  dextrose Oral Gel 15 Gram(s) Oral once PRN  DULoxetine 60 milliGRAM(s) Oral daily  enoxaparin Injectable 40 milliGRAM(s) SubCutaneous every 24 hours  glucagon  Injectable 1 milliGRAM(s) IntraMuscular once  insulin lispro (ADMELOG) corrective regimen sliding scale   SubCutaneous three times a day before meals  levothyroxine 150 MICROGram(s) Oral daily  sodium chloride 0.9% Bolus 250 milliLiter(s) IV Bolus once  sodium chloride 0.9%. 1000 milliLiter(s) IV Continuous <Continuous>    Vital Signs Last 24 Hrs  T(C): 36.7 (21 Mar 2025 01:10), Max: 36.7 (21 Mar 2025 01:10)  T(F): 98 (21 Mar 2025 01:10), Max: 98 (21 Mar 2025 01:10)  HR: 86 (21 Mar 2025 05:08) (79 - 86)  BP: 119/58 (21 Mar 2025 05:08) (111/60 - 163/90)  BP(mean): 104 (20 Mar 2025 21:00) (104 - 104)  RR: 18 (21 Mar 2025 05:08) (16 - 18)  SpO2: 100% (21 Mar 2025 05:08) (95% - 100%)    Parameters below as of 21 Mar 2025 05:08  Patient On (Oxygen Delivery Method): nasal cannula  O2 Flow (L/min): 2    Physical Exam  GENERAL: well-developed, well-nourished, in no acute distress    NEUROLOGIC:  Mental status: AOx  Language: Speech with intact fluency , naming , repetition , comprehension, absent dysarthria  Cranial nerves:   II: Visual fields are full to confrontation  III, IV, VI: Extraocular movements intact, no noticeable nystagmus, pupils equally round and reactive to light  V:  Facial sensation V1-V3 equal and intact   VII: Face is symmetric with normal eye closure and smile  VIII: Hearing is intact bilaterally intact  IX, X: Uvula is midline and soft palate rises symmetrically  XI: Head turning and shoulder shrug are intact and symmetric  XII: Tongue protrudes midline  Motor:  - shoulder abduction     R 5/5     L 5/5  - elbow flexion     R 5/5     L 5/5  - elbow extension     R 5/5     L 5/5  - hand      R 5/5     L 5/5   - wrist flexion     R 5/5     L 5/5  - wrist extension     R 5/5     L 5/5  - finger flexion     R 5/5     L 5/5  - finger extension     R 5/5     L 5/5  - finger abduction     R 5/5     L 5/5  - hip flexion     R 5/5     L 5/5  - knee flexion     R 5/5     L 5/5  - knee extension     R 5/5     L 5/5  - dorsiflexion     R 5/5     L 5/5  - plantarflexion     R 5/5     L 5/5  - pronator drift     absent / present L / R  Sensation: Intact to light touch bilaterally. No neglect or extinction on double simultaneous testing.  Coordination: No dysmetria on finger-to-nose and heel-to-shin bilaterally, rapid alternating movements intact and symmetric  Reflexes:   - biceps     R 2+     L 2+  - triceps     R 2+     L 2+  - brachioradialis     R 2+     L 2+  - patellar     R 2+     L 2+  - Achilles     R 2+     L 2+  - Babinski     R downgoing / upgoing    L downgoing / upgoing  - Hernández     R absent / present     L absent / present  Gait: Narrow gait and steady, Romberg sign negative / Deferred at this encounter    NIHSS:   - Level of consciousness:   - Ask month and age:   - 'Blink eyes' & 'squeeze hands':   - Horizontal extraocular movements:   - Visual fields:   - Facial palsy:   - Left arm motor drift:   - Right arm motor drift:   - Left leg motor drift:   - Right leg motor drift:   - Limb Ataxia:   - Sensation:   - Language/aphasia:   - Dysarthria:   - Extinction/inattention:     Labs:  CBC Full  -  ( 20 Mar 2025 18:15 )  WBC Count : 3.92 K/uL  RBC Count : 2.53 M/uL  Hemoglobin : 9.6 g/dL  Hematocrit : 27.7 %  Platelet Count - Automated : 174 K/uL  Mean Cell Volume : 109.5 fL  Mean Cell Hemoglobin : 37.9 pg  Mean Cell Hemoglobin Concentration : 34.7 g/dL  Auto Neutrophil # : x  Auto Lymphocyte # : x  Auto Monocyte # : x  Auto Eosinophil # : x  Auto Basophil # : x  Auto Neutrophil % : x  Auto Lymphocyte % : x  Auto Monocyte % : x  Auto Eosinophil % : x  Auto Basophil % : x    03-20    140  |  103  |  30[H]  ----------------------------<  158[H]  4.5   |  26  |  1.8[H]    Ca    9.6      20 Mar 2025 18:15    TPro  6.4  /  Alb  4.2  /  TBili  0.5  /  DBili  x   /  AST  29  /  ALT  16  /  AlkPhos  141[H]  03-20    LIVER FUNCTIONS - ( 20 Mar 2025 18:15 )  Alb: 4.2 g/dL / Pro: 6.4 g/dL / ALK PHOS: 141 U/L / ALT: 16 U/L / AST: 29 U/L / GGT: x           PT/INR - ( 20 Mar 2025 18:15 )   PT: 11.10 sec;   INR: 0.94 ratio         PTT - ( 20 Mar 2025 18:15 )  PTT:32.4 sec  Urinalysis Basic - ( 20 Mar 2025 18:15 )    Color: x / Appearance: x / SG: x / pH: x  Gluc: 158 mg/dL / Ketone: x  / Bili: x / Urobili: x   Blood: x / Protein: x / Nitrite: x   Leuk Esterase: x / RBC: x / WBC x   Sq Epi: x / Non Sq Epi: x / Bacteria: x     Neurology Progress Note    Interval History: patient reported doing well, in good spirits    Medications:  aspirin  chewable 81 milliGRAM(s) Oral daily  atorvastatin 80 milliGRAM(s) Oral at bedtime  clopidogrel Tablet 75 milliGRAM(s) Oral daily  dextrose 5%. 1000 milliLiter(s) IV Continuous <Continuous>  dextrose 5%. 1000 milliLiter(s) IV Continuous <Continuous>  dextrose 50% Injectable 25 Gram(s) IV Push once  dextrose 50% Injectable 12.5 Gram(s) IV Push once  dextrose 50% Injectable 25 Gram(s) IV Push once  dextrose Oral Gel 15 Gram(s) Oral once PRN  DULoxetine 60 milliGRAM(s) Oral daily  enoxaparin Injectable 40 milliGRAM(s) SubCutaneous every 24 hours  glucagon  Injectable 1 milliGRAM(s) IntraMuscular once  insulin lispro (ADMELOG) corrective regimen sliding scale   SubCutaneous three times a day before meals  levothyroxine 150 MICROGram(s) Oral daily  sodium chloride 0.9% Bolus 250 milliLiter(s) IV Bolus once  sodium chloride 0.9%. 1000 milliLiter(s) IV Continuous <Continuous>    Vital Signs Last 24 Hrs  T(C): 36.7 (21 Mar 2025 01:10), Max: 36.7 (21 Mar 2025 01:10)  T(F): 98 (21 Mar 2025 01:10), Max: 98 (21 Mar 2025 01:10)  HR: 86 (21 Mar 2025 05:08) (79 - 86)  BP: 119/58 (21 Mar 2025 05:08) (111/60 - 163/90)  BP(mean): 104 (20 Mar 2025 21:00) (104 - 104)  RR: 18 (21 Mar 2025 05:08) (16 - 18)  SpO2: 100% (21 Mar 2025 05:08) (95% - 100%)    Parameters below as of 21 Mar 2025 05:08  Patient On (Oxygen Delivery Method): nasal cannula  O2 Flow (L/min): 2    Physical Exam  GENERAL: well-developed, well-nourished, in no acute distress    NEUROLOGIC:  Mental status: AOx4  Language: Speech with intact fluency , naming , repetition , comprehension, mild dysarthria  Cranial nerves:   II: Visual fields are full to confrontation  III, IV, VI: Extraocular movements intact, no noticeable nystagmus, pupils equally round and reactive to light, OD exotropia  V:  Facial sensation V1-V3 equal and intact   VII: Face is symmetric with normal eye closure and smile  VIII: Hearing is intact bilaterally intact  IX, X: Uvula is midline and soft palate rises symmetrically  XI: Head turning and shoulder shrug are intact and symmetric  XII: Tongue protrudes midline  Motor:  - shoulder abduction     R 5/5     L 5/5  - elbow flexion     R 4+/5     L 5/5  - elbow extension     R 4+/5     L 5/5  - hand      R 5/5     L 4+/5   - wrist flexion     R 4+/5     L 5/5  - wrist extension     R 4+/5     L 5/5  - finger flexion     R 4+/5     L 5/5  - finger extension     R 4+/5     L 5/5  - hip flexion     R 5/5     L 5/5  - knee flexion     R 5/5     L 5/5  - knee extension     R 5/5     L 5/5  - dorsiflexion     R 5/5     L 5/5  - plantarflexion     R 5/5     L 5/5  - pronator drift    present R  Sensation: Intact to light touch bilaterally. No neglect or extinction on double simultaneous testing.  Coordination: dysmetria R finger-to-nose, preserved heel-to-shin bilaterally, rapid alternating movements intact and symmetric  Reflexes:   - biceps     R 2+     L 2+  - brachioradialis     R 1+     L 1+  - patellar     R 1+     L 1+  - Achilles     R 1+     L 1+  - Babinski     R downgoing    L downgoing  Gait: Deferred at this encounter    NIHSS: 2  - Limb Ataxia: RUE 1  - Dysarthria: mild 1    Labs:  CBC Full  -  ( 20 Mar 2025 18:15 )  WBC Count : 3.92 K/uL  RBC Count : 2.53 M/uL  Hemoglobin : 9.6 g/dL  Hematocrit : 27.7 %  Platelet Count - Automated : 174 K/uL  Mean Cell Volume : 109.5 fL  Mean Cell Hemoglobin : 37.9 pg  Mean Cell Hemoglobin Concentration : 34.7 g/dL  Auto Neutrophil # : x  Auto Lymphocyte # : x  Auto Monocyte # : x  Auto Eosinophil # : x  Auto Basophil # : x  Auto Neutrophil % : x  Auto Lymphocyte % : x  Auto Monocyte % : x  Auto Eosinophil % : x  Auto Basophil % : x    03-20    140  |  103  |  30[H]  ----------------------------<  158[H]  4.5   |  26  |  1.8[H]    Ca    9.6      20 Mar 2025 18:15    TPro  6.4  /  Alb  4.2  /  TBili  0.5  /  DBili  x   /  AST  29  /  ALT  16  /  AlkPhos  141[H]  03-20    LIVER FUNCTIONS - ( 20 Mar 2025 18:15 )  Alb: 4.2 g/dL / Pro: 6.4 g/dL / ALK PHOS: 141 U/L / ALT: 16 U/L / AST: 29 U/L / GGT: x           PT/INR - ( 20 Mar 2025 18:15 )   PT: 11.10 sec;   INR: 0.94 ratio         PTT - ( 20 Mar 2025 18:15 )  PTT:32.4 sec  Urinalysis Basic - ( 20 Mar 2025 18:15 )    Color: x / Appearance: x / SG: x / pH: x  Gluc: 158 mg/dL / Ketone: x  / Bili: x / Urobili: x   Blood: x / Protein: x / Nitrite: x   Leuk Esterase: x / RBC: x / WBC x   Sq Epi: x / Non Sq Epi: x / Bacteria: x

## 2025-03-21 NOTE — SWALLOW BEDSIDE ASSESSMENT ADULT - SLP PERTINENT HISTORY OF CURRENT PROBLEM
75yo woman PMH of aortic stenosis, hypertension, diabetes, CORTEZ, hypothyroidism, former smoker (quit 1996; still occasionally vapes medical marijuana) presenting with 1 day of R hand and leg weakness plus dysarthria, CTH with hypodensity in the right caudate head with small calcifications faintly evident on the last CT, CTA with near occlusion of left M1 MCA with moderate focal stenosis of the A2 segment of the right DIVINE, MRH confirms stroke subcortical L MCA territory suspect cardioembolic.

## 2025-03-21 NOTE — OCCUPATIONAL THERAPY INITIAL EVALUATION ADULT - LIVES WITH, PROFILE
Pt lives alone in 4th floor apartment Pt lives alone in 4th floor apartment. Pt reports elevator is being worked on and is currently out of order.

## 2025-03-21 NOTE — SWALLOW BEDSIDE ASSESSMENT ADULT - NS SPL SWALLOW CLINIC TRIAL FT
clary-pharyngeal swallow is WFL for thin, puree and regular consistency solids w/o overt s/s of penetration/aspiration

## 2025-03-21 NOTE — PHYSICAL THERAPY INITIAL EVALUATION ADULT - GAIT DEVIATIONS NOTED, PT EVAL
crouch/decreased summer/decreased velocity of limb motion/decreased step length/decreased stride length

## 2025-03-21 NOTE — SWALLOW BEDSIDE ASSESSMENT ADULT - SLP GENERAL OBSERVATIONS
Pt received in bed awake and alert on O2 NC, +mild dysarthria +use of over-articulation improves intelligibility

## 2025-03-21 NOTE — CONSULT NOTE ADULT - ASSESSMENT
75yo woman PMH of aortic stenosis, hypertension, diabetes, CORTEZ, hypothyroidism, former smoker (quit 1996; still occasionally vapes medical marijuana), disconjugate gaze at baseline, noticed R hand clumsiness the day before admission, was having difficulty gripping things and when she tried to write, her writing was "like chickenscratch."  She also noticed she was having a harder time picking up her R foot.  The following day she went to a doctor who noticed she had slurred speech.  Came to ED as stroke code.  NIH 1 for dysarthria with additional R hand weakness and mild R leg weakness found to have near complete stenosis of left M1 on CTA.  Baseline mRS 0.  CTH:  hypodensity in the right caudate head with small calcifications faintly evident on the last CT, more prominent now and more calcified.; hypodensity in the brainstem possibly artifact.  CTP:  negative  CTAs:  Near complete stenosis of the left M1 MCA with normal flow seen distally.  Moderate focal stenosis of the A2 segment of the right DIVINE.  MR brain with left caudate, basal ganglia, post limb and internal capsule acute infarct     EP consulted for ILR. denies any palpitations, no chest pain, no sob, no syncope  EKG with NSR, PAC, bifascicular block, LAFB, and MI prolongation.   TTE with normal EF, grade 1 DD and no AS    # Impression:  - Acute left MCA territory stroke  - HTN, DM, CORTEZ, hypothyroidism    # Recs:  - ILR before DC  - f/u with EP as needed as OP  - rest of care as per neuro team

## 2025-03-21 NOTE — PHYSICAL THERAPY INITIAL EVALUATION ADULT - NSPTDISCHREC_GEN_A_CORE
Patient requires assistance with functional mobility. Based on today's evaluation,  PT recommends D/C to home with assistance when medically appropriate pending stair evaluation

## 2025-03-21 NOTE — PATIENT PROFILE ADULT - FOOD INSECURITY
Returned call to pt from service. Has Chronic bronchitis since 08/08 and terrible cough on the phone. Intermittent vomiting when coughing gets bad . On Pro-Air inhaler q 4 hrs and prn. No fevers. + FM. Pt sounds terrible on the phone and has intermittent coughing fits . Never received antibiotics . I told her to go to [facility] immediately. Pt states she has to wait for her  to come home. I told her if it gets worse to call 911 so they can give her a neb -she does not have a nebulizer at home and to go steam in the shower NOW to help open her airways . Pt states she will go in to [facility] asap.    no

## 2025-03-22 LAB
ALBUMIN SERPL ELPH-MCNC: 4 G/DL — SIGNIFICANT CHANGE UP (ref 3.5–5.2)
ALP SERPL-CCNC: 120 U/L — HIGH (ref 30–115)
ALT FLD-CCNC: 14 U/L — SIGNIFICANT CHANGE UP (ref 0–41)
ANION GAP SERPL CALC-SCNC: 13 MMOL/L — SIGNIFICANT CHANGE UP (ref 7–14)
AST SERPL-CCNC: 26 U/L — SIGNIFICANT CHANGE UP (ref 0–41)
BASOPHILS # BLD AUTO: 0.01 K/UL — SIGNIFICANT CHANGE UP (ref 0–0.2)
BASOPHILS NFR BLD AUTO: 0.2 % — SIGNIFICANT CHANGE UP (ref 0–1)
BILIRUB SERPL-MCNC: 0.7 MG/DL — SIGNIFICANT CHANGE UP (ref 0.2–1.2)
BUN SERPL-MCNC: 30 MG/DL — HIGH (ref 10–20)
CALCIUM SERPL-MCNC: 9.5 MG/DL — SIGNIFICANT CHANGE UP (ref 8.4–10.5)
CHLORIDE SERPL-SCNC: 104 MMOL/L — SIGNIFICANT CHANGE UP (ref 98–110)
CO2 SERPL-SCNC: 24 MMOL/L — SIGNIFICANT CHANGE UP (ref 17–32)
CREAT SERPL-MCNC: 1.8 MG/DL — HIGH (ref 0.7–1.5)
EGFR: 29 ML/MIN/1.73M2 — LOW
EGFR: 29 ML/MIN/1.73M2 — LOW
EOSINOPHIL # BLD AUTO: 0.1 K/UL — SIGNIFICANT CHANGE UP (ref 0–0.7)
EOSINOPHIL NFR BLD AUTO: 2 % — SIGNIFICANT CHANGE UP (ref 0–8)
GLUCOSE BLDC GLUCOMTR-MCNC: 163 MG/DL — HIGH (ref 70–99)
GLUCOSE BLDC GLUCOMTR-MCNC: 186 MG/DL — HIGH (ref 70–99)
GLUCOSE BLDC GLUCOMTR-MCNC: 217 MG/DL — HIGH (ref 70–99)
GLUCOSE BLDC GLUCOMTR-MCNC: 236 MG/DL — HIGH (ref 70–99)
GLUCOSE BLDC GLUCOMTR-MCNC: 266 MG/DL — HIGH (ref 70–99)
GLUCOSE SERPL-MCNC: 198 MG/DL — HIGH (ref 70–99)
HCT VFR BLD CALC: 27.5 % — LOW (ref 37–47)
HGB BLD-MCNC: 9.5 G/DL — LOW (ref 12–16)
IMM GRANULOCYTES NFR BLD AUTO: 0.4 % — HIGH (ref 0.1–0.3)
LYMPHOCYTES # BLD AUTO: 0.81 K/UL — LOW (ref 1.2–3.4)
LYMPHOCYTES # BLD AUTO: 15.9 % — LOW (ref 20.5–51.1)
MAGNESIUM SERPL-MCNC: 2.1 MG/DL — SIGNIFICANT CHANGE UP (ref 1.8–2.4)
MCHC RBC-ENTMCNC: 34.5 G/DL — SIGNIFICANT CHANGE UP (ref 32–37)
MCHC RBC-ENTMCNC: 38 PG — HIGH (ref 27–31)
MCV RBC AUTO: 110 FL — HIGH (ref 81–99)
MONOCYTES # BLD AUTO: 0.68 K/UL — HIGH (ref 0.1–0.6)
MONOCYTES NFR BLD AUTO: 13.3 % — HIGH (ref 1.7–9.3)
NEUTROPHILS # BLD AUTO: 3.48 K/UL — SIGNIFICANT CHANGE UP (ref 1.4–6.5)
NEUTROPHILS NFR BLD AUTO: 68.2 % — SIGNIFICANT CHANGE UP (ref 42.2–75.2)
NRBC BLD AUTO-RTO: 0 /100 WBCS — SIGNIFICANT CHANGE UP (ref 0–0)
PHOSPHATE SERPL-MCNC: 3.6 MG/DL — SIGNIFICANT CHANGE UP (ref 2.1–4.9)
PLATELET # BLD AUTO: 165 K/UL — SIGNIFICANT CHANGE UP (ref 130–400)
PMV BLD: 9.4 FL — SIGNIFICANT CHANGE UP (ref 7.4–10.4)
POTASSIUM SERPL-MCNC: 4.4 MMOL/L — SIGNIFICANT CHANGE UP (ref 3.5–5)
POTASSIUM SERPL-SCNC: 4.4 MMOL/L — SIGNIFICANT CHANGE UP (ref 3.5–5)
PROT SERPL-MCNC: 5.5 G/DL — LOW (ref 6–8)
RBC # BLD: 2.5 M/UL — LOW (ref 4.2–5.4)
RBC # FLD: 13.8 % — SIGNIFICANT CHANGE UP (ref 11.5–14.5)
SODIUM SERPL-SCNC: 141 MMOL/L — SIGNIFICANT CHANGE UP (ref 135–146)
TROPONIN T, HIGH SENSITIVITY RESULT: 71 NG/L — CRITICAL HIGH (ref 6–13)
TROPONIN T, HIGH SENSITIVITY RESULT: 78 NG/L — CRITICAL HIGH (ref 6–13)
WBC # BLD: 5.1 K/UL — SIGNIFICANT CHANGE UP (ref 4.8–10.8)
WBC # FLD AUTO: 5.1 K/UL — SIGNIFICANT CHANGE UP (ref 4.8–10.8)

## 2025-03-22 PROCEDURE — 99233 SBSQ HOSP IP/OBS HIGH 50: CPT

## 2025-03-22 PROCEDURE — 99232 SBSQ HOSP IP/OBS MODERATE 35: CPT

## 2025-03-22 RX ORDER — DEXTROSE 50 % IN WATER 50 %
15 SYRINGE (ML) INTRAVENOUS ONCE
Refills: 0 | Status: DISCONTINUED | OUTPATIENT
Start: 2025-03-22 | End: 2025-03-28

## 2025-03-22 RX ORDER — NIFEDIPINE 30 MG
30 TABLET, EXTENDED RELEASE 24 HR ORAL DAILY
Refills: 0 | Status: DISCONTINUED | OUTPATIENT
Start: 2025-03-22 | End: 2025-03-23

## 2025-03-22 RX ORDER — GLUCAGON 3 MG/1
1 POWDER NASAL ONCE
Refills: 0 | Status: DISCONTINUED | OUTPATIENT
Start: 2025-03-22 | End: 2025-03-28

## 2025-03-22 RX ORDER — INSULIN GLARGINE-YFGN 100 [IU]/ML
15 INJECTION, SOLUTION SUBCUTANEOUS ONCE
Refills: 0 | Status: COMPLETED | OUTPATIENT
Start: 2025-03-22 | End: 2025-03-22

## 2025-03-22 RX ORDER — SODIUM CHLORIDE 9 G/1000ML
1000 INJECTION, SOLUTION INTRAVENOUS
Refills: 0 | Status: DISCONTINUED | OUTPATIENT
Start: 2025-03-22 | End: 2025-03-28

## 2025-03-22 RX ORDER — INSULIN LISPRO 100 U/ML
INJECTION, SOLUTION INTRAVENOUS; SUBCUTANEOUS AT BEDTIME
Refills: 0 | Status: DISCONTINUED | OUTPATIENT
Start: 2025-03-22 | End: 2025-03-28

## 2025-03-22 RX ORDER — INSULIN LISPRO 100 U/ML
INJECTION, SOLUTION INTRAVENOUS; SUBCUTANEOUS
Refills: 0 | Status: DISCONTINUED | OUTPATIENT
Start: 2025-03-22 | End: 2025-03-28

## 2025-03-22 RX ORDER — DEXTROSE 50 % IN WATER 50 %
25 SYRINGE (ML) INTRAVENOUS ONCE
Refills: 0 | Status: DISCONTINUED | OUTPATIENT
Start: 2025-03-22 | End: 2025-03-28

## 2025-03-22 RX ORDER — LABETALOL HYDROCHLORIDE 200 MG/1
5 TABLET, FILM COATED ORAL ONCE
Refills: 0 | Status: COMPLETED | OUTPATIENT
Start: 2025-03-22 | End: 2025-03-22

## 2025-03-22 RX ORDER — INSULIN GLARGINE-YFGN 100 [IU]/ML
15 INJECTION, SOLUTION SUBCUTANEOUS EVERY MORNING
Refills: 0 | Status: DISCONTINUED | OUTPATIENT
Start: 2025-03-23 | End: 2025-03-24

## 2025-03-22 RX ORDER — DEXTROSE 50 % IN WATER 50 %
12.5 SYRINGE (ML) INTRAVENOUS ONCE
Refills: 0 | Status: DISCONTINUED | OUTPATIENT
Start: 2025-03-22 | End: 2025-03-28

## 2025-03-22 RX ADMIN — Medication 150 MICROGRAM(S): at 06:35

## 2025-03-22 RX ADMIN — ENOXAPARIN SODIUM 40 MILLIGRAM(S): 100 INJECTION SUBCUTANEOUS at 22:36

## 2025-03-22 RX ADMIN — Medication 15 UNIT(S): at 07:08

## 2025-03-22 RX ADMIN — SODIUM CHLORIDE 75 MILLILITER(S): 9 INJECTION, SOLUTION INTRAVENOUS at 09:18

## 2025-03-22 RX ADMIN — Medication 81 MILLIGRAM(S): at 11:47

## 2025-03-22 RX ADMIN — GABAPENTIN 100 MILLIGRAM(S): 400 CAPSULE ORAL at 13:15

## 2025-03-22 RX ADMIN — Medication 650 MILLIGRAM(S): at 06:34

## 2025-03-22 RX ADMIN — INSULIN GLARGINE-YFGN 15 UNIT(S): 100 INJECTION, SOLUTION SUBCUTANEOUS at 12:15

## 2025-03-22 RX ADMIN — DULOXETINE 60 MILLIGRAM(S): 20 CAPSULE, DELAYED RELEASE ORAL at 11:48

## 2025-03-22 RX ADMIN — LABETALOL HYDROCHLORIDE 5 MILLIGRAM(S): 200 TABLET, FILM COATED ORAL at 08:42

## 2025-03-22 RX ADMIN — Medication 30 MILLIGRAM(S): at 11:41

## 2025-03-22 RX ADMIN — TICAGRELOR 90 MILLIGRAM(S): 90 TABLET ORAL at 06:35

## 2025-03-22 RX ADMIN — TICAGRELOR 90 MILLIGRAM(S): 90 TABLET ORAL at 17:49

## 2025-03-22 RX ADMIN — GABAPENTIN 100 MILLIGRAM(S): 400 CAPSULE ORAL at 06:35

## 2025-03-22 RX ADMIN — INSULIN LISPRO 4: 100 INJECTION, SOLUTION INTRAVENOUS; SUBCUTANEOUS at 08:33

## 2025-03-22 RX ADMIN — INSULIN LISPRO 1: 100 INJECTION, SOLUTION INTRAVENOUS; SUBCUTANEOUS at 22:35

## 2025-03-22 RX ADMIN — BUPROPION HYDROBROMIDE 150 MILLIGRAM(S): 522 TABLET, EXTENDED RELEASE ORAL at 11:48

## 2025-03-22 RX ADMIN — ATORVASTATIN CALCIUM 80 MILLIGRAM(S): 80 TABLET, FILM COATED ORAL at 22:37

## 2025-03-22 RX ADMIN — INSULIN LISPRO 2: 100 INJECTION, SOLUTION INTRAVENOUS; SUBCUTANEOUS at 12:16

## 2025-03-22 RX ADMIN — FINASTERIDE 5 MILLIGRAM(S): 1 TABLET, FILM COATED ORAL at 22:37

## 2025-03-22 RX ADMIN — INSULIN LISPRO 1: 100 INJECTION, SOLUTION INTRAVENOUS; SUBCUTANEOUS at 17:06

## 2025-03-22 RX ADMIN — GABAPENTIN 100 MILLIGRAM(S): 400 CAPSULE ORAL at 22:36

## 2025-03-22 NOTE — PROGRESS NOTE ADULT - ASSESSMENT
73yo woman PMH of aortic stenosis, hypertension, diabetes, CORTEZ, hypothyroidism, former smoker (quit 1996; still occasionally vapes medical marijuana) presenting with 1 day of R hand and leg weakness plus dysarthria, CTH with hypodensity in the right caudate head with small calcifications faintly evident on the last CT, CTA with near occlusion of left M1 MCA with moderate focal stenosis of the A2 segment of the right DIVINE, MRH confirms stroke subcortical L MCA territory suspect cardioembolic. Patient already taking plavix daily at baseline per primary care provider. PRU 3/21 234 suggesting plavix nonresponder.    #subcortical L MCA territory suspect cardioembolic  #HTN  - s/p DAPT load  - EP consult for ILR  - antiplatelets/anticoagulants: aspirin 81mg daily , clopidogrel 75mg daily > switch to brilinta 90mg twice daily   - antilipemics: atorvastatin 80mg nightly inpatient > discharge on rosuvastatin 40mg nightly   - SBP goal: 120-160  - q4h neuro and vitals checks  - holding home lisinopril- hydrochlorothiazide 20/25 mg twice daily     #CKD baseline 2.3-3.4  - Cr improved from previous  - 75c LR 24 hours    #CORTEZ  - follow up pulm outpatient for better fitting CPAP  - supplemental O2 at night    #Hypothyroid  - c/w home Synthroid 150mcg daily     #DM w/ neuropathy  - A1c 7.9  - holding home Humalog 75/25 ~45U twice daily   - hospitalist advising converting to insulin NPH 30U twice daily > will start on 15U twice daily and increase as tolerated/required  - insulin corrective sliding scale     #sciatica  #diabetic neuropathy  - gabapentin 400mg twice daily (home dose) > converted to 100mg three times daily per CrCl  - duloxetine 60mg daily     #hair loss  - finasteride 5mg daily 5mg nightly (home dose)     #Depression/Anxiety  - Bupropion SR 150mg daily > interchange to bupropion XL 150mg daily     --------------------------------------------------  #prophylactic measures  - DVT prophylaxis: lovenox / SCDs  - GI prophylaxis: not indicated at this time  - Diet: DASH/TLC / consistent carbohydrate  --------------------------------------------------  #disposition -   - PT / OT / Physiatry advises discharge to: pending   - patient planning to discharge to: pending  73yo woman PMH of aortic stenosis, hypertension, diabetes, CORTEZ, hypothyroidism, former smoker (quit 1996; still occasionally vapes medical marijuana) presenting with 1 day of R hand and leg weakness plus dysarthria, CTH with hypodensity in the right caudate head with small calcifications faintly evident on the last CT, CTA with near occlusion of left M1 MCA with moderate focal stenosis of the A2 segment of the right DIVINE, MRH confirms stroke subcortical L MCA territory suspect cardioembolic. Patient already taking plavix daily at baseline per primary care provider. PRU 3/21 234 suggesting plavix nonresponder.    #subcortical L MCA territory suspect cardioembolic  #HTN  - s/p DAPT load  - EP consult for ILR  - antiplatelets/anticoagulants: aspirin 81mg daily , clopidogrel 75mg daily > switch to brilinta 90mg twice daily   - antilipemics: atorvastatin 80mg nightly inpatient > discharge on rosuvastatin 40mg nightly   - SBP goal: 120-160  - q4h neuro and vitals checks  - holding home lisinopril- hydrochlorothiazide 20/25 mg twice daily due to CKD   - Started nifedipine 30 mg daily    #CKD baseline 2.3-3.4  - Cr improved from previous  - 75c LR 24 hours, stopped as per hospitalist recs    #CORTEZ  - follow up pulm outpatient for better fitting CPAP  - supplemental O2 at night    #Hypothyroid  - c/w home Synthroid 150mcg daily     #DM w/ neuropathy  - A1c 7.9  - holding home Humalog 75/25 ~45U twice daily   - hospitalist advising converting to insulin NPH 30U twice daily > will start on 15U twice daily and increase as tolerated/required  - insulin corrective sliding scale     #sciatica  #diabetic neuropathy  - gabapentin 400mg twice daily (home dose) > converted to 100mg three times daily per CrCl  - duloxetine 60mg daily     #hair loss  - finasteride 5mg daily 5mg nightly (home dose)     #Depression/Anxiety  - Bupropion SR 150mg daily > interchange to bupropion XL 150mg daily     --------------------------------------------------  #prophylactic measures  - DVT prophylaxis: lovenox / SCDs  - GI prophylaxis: not indicated at this time  - Diet: DASH/TLC / consistent carbohydrate  --------------------------------------------------  #disposition -   - PT / OT / Physiatry advises discharge to: pending   - patient planning to discharge to: pending

## 2025-03-22 NOTE — PROGRESS NOTE ADULT - SUBJECTIVE AND OBJECTIVE BOX
Patient is a 74y old  Female who presents with a chief complaint of stroke (22 Mar 2025 07:42)      Patient seen and examined at bedside.    ALLERGIES:  clindamycin (Rash)    MEDICATIONS:  acetaminophen     Tablet .. 650 milliGRAM(s) Oral every 6 hours PRN  aspirin  chewable 81 milliGRAM(s) Oral daily  atorvastatin 80 milliGRAM(s) Oral at bedtime  buPROPion XL (24-Hour) . 150 milliGRAM(s) Oral daily  dextrose 5%. 1000 milliLiter(s) IV Continuous <Continuous>  dextrose 5%. 1000 milliLiter(s) IV Continuous <Continuous>  dextrose 50% Injectable 25 Gram(s) IV Push once  dextrose 50% Injectable 12.5 Gram(s) IV Push once  dextrose 50% Injectable 25 Gram(s) IV Push once  dextrose Oral Gel 15 Gram(s) Oral once PRN  DULoxetine 60 milliGRAM(s) Oral daily  enoxaparin Injectable 40 milliGRAM(s) SubCutaneous every 24 hours  finasteride 5 milliGRAM(s) Oral at bedtime  gabapentin 100 milliGRAM(s) Oral three times a day  glucagon  Injectable 1 milliGRAM(s) IntraMuscular once  insulin lispro (ADMELOG) corrective regimen sliding scale   SubCutaneous three times a day before meals  insulin lispro (ADMELOG) corrective regimen sliding scale   SubCutaneous at bedtime  levothyroxine 150 MICROGram(s) Oral daily  NIFEdipine XL 30 milliGRAM(s) Oral daily  ticagrelor 90 milliGRAM(s) Oral two times a day    Vital Signs Last 24 Hrs  T(F): 97.5 (22 Mar 2025 12:00), Max: 97.9 (22 Mar 2025 04:00)  HR: 78 (22 Mar 2025 12:00) (70 - 86)  BP: 155/56 (22 Mar 2025 12:00) (132/72 - 177/67)  RR: 18 (22 Mar 2025 12:00) (18 - 19)  SpO2: 99% (22 Mar 2025 12:00) (94% - 99%)  I&O's Summary      PHYSICAL EXAM:  General: NAD, A/O x 3  ENT: MMM  Neck: Supple, No JVD  Lungs: bilateral lower crackles  Cardio: RRR, S1/S2, No murmurs  Abdomen: Soft, Nontender, Nondistended; Bowel sounds present  Extremities: No cyanosis, No edema    LABS:                        9.5    5.10  )-----------( 165      ( 22 Mar 2025 06:43 )             27.5     03-22    141  |  104  |  30  ----------------------------<  198  4.4   |  24  |  1.8    Ca    9.5      22 Mar 2025 06:43  Phos  3.6     03-22  Mg     2.1     03-22    TPro  5.5  /  Alb  4.0  /  TBili  0.7  /  DBili  x   /  AST  26  /  ALT  14  /  AlkPhos  120  03-22      PT/INR - ( 20 Mar 2025 18:15 )   PT: 11.10 sec;   INR: 0.94 ratio         PTT - ( 20 Mar 2025 18:15 )  PTT:32.4 sec        03-21 Chol 152 mg/dL LDL -- HDL 50 mg/dL Trig 139 mg/dL  TSH 1.08   TSH with FT4 reflex --  Total T3 --              POCT Blood Glucose.: 236 mg/dL (22 Mar 2025 11:43)  POCT Blood Glucose.: 217 mg/dL (22 Mar 2025 08:08)  POCT Blood Glucose.: 186 mg/dL (22 Mar 2025 07:06)  POCT Blood Glucose.: 261 mg/dL (21 Mar 2025 21:44)  POCT Blood Glucose.: 192 mg/dL (21 Mar 2025 18:29)  POCT Blood Glucose.: 190 mg/dL (21 Mar 2025 16:15)      Urinalysis Basic - ( 22 Mar 2025 06:43 )    Color: x / Appearance: x / SG: x / pH: x  Gluc: 198 mg/dL / Ketone: x  / Bili: x / Urobili: x   Blood: x / Protein: x / Nitrite: x   Leuk Esterase: x / RBC: x / WBC x   Sq Epi: x / Non Sq Epi: x / Bacteria: x            RADIOLOGY & ADDITIONAL TESTS:    Care Discussed with Consultants/Other Providers:  Patient is a 74y old  Female who presents with a chief complaint of stroke (22 Mar 2025 07:42)      Patient seen and examined at bedside.  Pt denies any chest pain, endorses cough and shortness of breath on movement    ALLERGIES:  clindamycin (Rash)    MEDICATIONS:  acetaminophen     Tablet .. 650 milliGRAM(s) Oral every 6 hours PRN  aspirin  chewable 81 milliGRAM(s) Oral daily  atorvastatin 80 milliGRAM(s) Oral at bedtime  buPROPion XL (24-Hour) . 150 milliGRAM(s) Oral daily  dextrose 5%. 1000 milliLiter(s) IV Continuous <Continuous>  dextrose 5%. 1000 milliLiter(s) IV Continuous <Continuous>  dextrose 50% Injectable 25 Gram(s) IV Push once  dextrose 50% Injectable 12.5 Gram(s) IV Push once  dextrose 50% Injectable 25 Gram(s) IV Push once  dextrose Oral Gel 15 Gram(s) Oral once PRN  DULoxetine 60 milliGRAM(s) Oral daily  enoxaparin Injectable 40 milliGRAM(s) SubCutaneous every 24 hours  finasteride 5 milliGRAM(s) Oral at bedtime  gabapentin 100 milliGRAM(s) Oral three times a day  glucagon  Injectable 1 milliGRAM(s) IntraMuscular once  insulin lispro (ADMELOG) corrective regimen sliding scale   SubCutaneous three times a day before meals  insulin lispro (ADMELOG) corrective regimen sliding scale   SubCutaneous at bedtime  levothyroxine 150 MICROGram(s) Oral daily  NIFEdipine XL 30 milliGRAM(s) Oral daily  ticagrelor 90 milliGRAM(s) Oral two times a day    Vital Signs Last 24 Hrs  T(F): 97.5 (22 Mar 2025 12:00), Max: 97.9 (22 Mar 2025 04:00)  HR: 78 (22 Mar 2025 12:00) (70 - 86)  BP: 155/56 (22 Mar 2025 12:00) (132/72 - 177/67)  RR: 18 (22 Mar 2025 12:00) (18 - 19)  SpO2: 99% (22 Mar 2025 12:00) (94% - 99%)  I&O's Summary      PHYSICAL EXAM:  General: NAD, A/O x 3  ENT: MMM  Neck: Supple, No JVD  Lungs: bilateral lower crackles  Cardio: RRR, S1/S2, No murmurs  Abdomen: Soft, Nontender, Nondistended; obese  Extremities: No cyanosis, +2 Le edema    LABS:                        9.5    5.10  )-----------( 165      ( 22 Mar 2025 06:43 )             27.5     03-22    141  |  104  |  30  ----------------------------<  198  4.4   |  24  |  1.8    Ca    9.5      22 Mar 2025 06:43  Phos  3.6     03-22  Mg     2.1     03-22    TPro  5.5  /  Alb  4.0  /  TBili  0.7  /  DBili  x   /  AST  26  /  ALT  14  /  AlkPhos  120  03-22      PT/INR - ( 20 Mar 2025 18:15 )   PT: 11.10 sec;   INR: 0.94 ratio         PTT - ( 20 Mar 2025 18:15 )  PTT:32.4 sec        03-21 Chol 152 mg/dL LDL -- HDL 50 mg/dL Trig 139 mg/dL  TSH 1.08   TSH with FT4 reflex --  Total T3 --              POCT Blood Glucose.: 236 mg/dL (22 Mar 2025 11:43)  POCT Blood Glucose.: 217 mg/dL (22 Mar 2025 08:08)  POCT Blood Glucose.: 186 mg/dL (22 Mar 2025 07:06)  POCT Blood Glucose.: 261 mg/dL (21 Mar 2025 21:44)  POCT Blood Glucose.: 192 mg/dL (21 Mar 2025 18:29)  POCT Blood Glucose.: 190 mg/dL (21 Mar 2025 16:15)      Urinalysis Basic - ( 22 Mar 2025 06:43 )    Color: x / Appearance: x / SG: x / pH: x  Gluc: 198 mg/dL / Ketone: x  / Bili: x / Urobili: x   Blood: x / Protein: x / Nitrite: x   Leuk Esterase: x / RBC: x / WBC x   Sq Epi: x / Non Sq Epi: x / Bacteria: x            RADIOLOGY & ADDITIONAL TESTS:  < from: TTE Echo Complete w/o Contrast w/ Doppler (03.21.25 @ 09:53) >      Summary:   1. Technically difficult study.   2. Mildly decreased global left ventricular systolic function.   3. LV Ejection Fraction by Lester's Method with a biplane EF of 53 %.   4. Normal left ventricular internal cavity size.  5. Spectral Doppler shows impaired relaxation pattern of left   ventricular myocardial filling (Grade I diastolic dysfunction).   6. Normal right ventricular size and function.   7. Mildly enlarged left atrium.   8. Normal right atrial size.   9. Mild mitral annular calcification.  10. Mild to moderate mitral valve regurgitation.  11. Thickening and calcification of the anterior and posterior mitral   valve leaflets.  12. Mild tricuspid regurgitation.  13. Sclerotic aortic valve with normal opening.    < end of copied text >    Care Discussed with Consultants/Other Providers:

## 2025-03-22 NOTE — PROGRESS NOTE ADULT - ASSESSMENT
75yo woman PMH of aortic stenosis, hypertension, diabetes, CORTEZ, hypothyroidism, former smoker (quit 1996; still occasionally vapes medical marijuana) presenting with 1 day of R hand and leg weakness plus dysarthria, CTH with hypodensity in the right caudate head with small calcifications faintly evident on the last CT, CTA with near occlusion of left M1 MCA with moderate focal stenosis of the A2 segment of the right DIVINE, MRH confirms stroke subcortical L MCA territory suspect cardioembolic. Patient already taking plavix daily at baseline per primary care provider. PRU 3/21 234 suggesting plavix nonresponder.    Subcortical L MCA territory suspect cardioembolic:  HTN:  symptoms improved,   Continue DAPT and Lipitor.   EP plan for loop recorder   SBP goal: 120-160, allow permissive HTN.     CKD stage 3-4  Cr 1.9, s/p IV contrast, start on RL 75 cc for 24hrs    Hypothyroid Synthroid 150mcg daily     DM w/ neuropathy  A1c 7.9  holding home Humalog 75/25 twice daily     sciatica  diabetic neuropathy  gabapentin 400mg twice daily (home dose)   duloxetine 60mg daily     Depression/Anxiety  Bupropion SR 150mg daily > interchange to bupropion XL 150mg daily     CORTEZ  follow up pulm outpatient for better fitting CPAP  - supplemental O2 at night 75yo woman PMH of aortic stenosis, hypertension, diabetes, CORTEZ, hypothyroidism, former smoker (quit 1996; still occasionally vapes medical marijuana) presenting with 1 day of R hand and leg weakness plus dysarthria, CTH with hypodensity in the right caudate head with small calcifications faintly evident on the last CT, CTA with near occlusion of left M1 MCA with moderate focal stenosis of the A2 segment of the right DIVINE, MRH confirms stroke subcortical L MCA territory suspect cardioembolic. Patient already taking plavix daily at baseline per primary care provider. PRU 3/21 234 suggesting plavix nonresponder.    #Subcortical L MCA territory suspect cardioembolic cva  #HTN  -discussed with neuro, to start nifedipine 30mg for better bp control  -Continue DAPT and Lipitor.   -EP plan for loop recorder   -SBP goal: 120-160, allow permissive HTN.     #CKD stage 3-4  -Cr 1.9->1.8, s/p IV contrast, start on RL 75 cc for 24hrs  -recommend stopping IVF as pt is fluid overloaded    #fluid overload  -IVF to stop  -recommend chest xray     #Hypothyroi  Hypothyroid Synthroid 150mcg daily     #DM w/ neuropathy  -A1c 7.9  -holding home Humalog 75/25 twice daily   -3/22 start lantus 15u  -monitor for improved bs control     #sciatica  #diabetic neuropathy  gabapentin 400mg twice daily (home dose)   duloxetine 60mg daily     #Depression/Anxiety  Bupropion SR 150mg daily > interchange to bupropion XL 150mg daily     #CORTEZ  follow up pulm outpatient for better fitting CPAP  - supplemental O2 at night    Pending: improved BS control, BP, fluid status, oxygen requirement

## 2025-03-22 NOTE — PROGRESS NOTE ADULT - SUBJECTIVE AND OBJECTIVE BOX
----------Daily Progress Note----------    Neurology Stroke Progress Note    INTERVAL HOSPITAL COURSE / OVERNIGHT EVENTS:      <<<<<PAST MEDICAL & SURGICAL HISTORY>>>>>  HTN (hypertension)    History of TIAs    Hypothyroid    Arthritis    H/O total knee replacement      ALLERGIES  clindamycin (Rash)      Home Medications:  Budeprion  mg/12 hours oral tablet, extended release: 1 orally (21 Mar 2025 13:06)  clopidogrel 75 mg oral tablet: 1 orally (21 Mar 2025 13:06)  Cymbalta 60 mg oral delayed release capsule: 1 orally (21 Mar 2025 13:06)  finasteride 5 mg oral tablet: 1 orally (21 Mar 2025 13:06)  gabapentin 400 mg oral tablet: 1 tab(s) orally 2 times a day (21 Mar 2025 13:05)  HumaLOG Mix 75/25 Pen subcutaneous suspension: subcutaneous 2 times a day (20 Mar 2025 23:15)  levothyroxine 150 mcg (0.15 mg) oral tablet: 1 orally (21 Mar 2025 13:06)  lisinopril-hydrochlorothiazide 20 mg-25 mg oral tablet: 1 tab(s) orally 2 times a day (20 Mar 2025 23:16)  rosuvastatin 20 mg oral capsule: 1 orally (21 Mar 2025 13:06)        MEDICATIONS  STANDING MEDICATIONS  aspirin  chewable 81 milliGRAM(s) Oral daily  atorvastatin 80 milliGRAM(s) Oral at bedtime  buPROPion XL (24-Hour) . 150 milliGRAM(s) Oral daily  dextrose 5%. 1000 milliLiter(s) IV Continuous <Continuous>  dextrose 5%. 1000 milliLiter(s) IV Continuous <Continuous>  dextrose 50% Injectable 25 Gram(s) IV Push once  dextrose 50% Injectable 12.5 Gram(s) IV Push once  dextrose 50% Injectable 25 Gram(s) IV Push once  DULoxetine 60 milliGRAM(s) Oral daily  enoxaparin Injectable 40 milliGRAM(s) SubCutaneous every 24 hours  finasteride 5 milliGRAM(s) Oral at bedtime  gabapentin 100 milliGRAM(s) Oral three times a day  glucagon  Injectable 1 milliGRAM(s) IntraMuscular once  insulin lispro (ADMELOG) corrective regimen sliding scale   SubCutaneous three times a day before meals  insulin NPH human recombinant 15 Unit(s) SubCutaneous two times a day  lactated ringers. 1000 milliLiter(s) IV Continuous <Continuous>  levothyroxine 150 MICROGram(s) Oral daily  sodium chloride 0.9%. 1000 milliLiter(s) IV Continuous <Continuous>  ticagrelor 90 milliGRAM(s) Oral two times a day    PRN MEDICATIONS  acetaminophen     Tablet .. 650 milliGRAM(s) Oral every 6 hours PRN  dextrose Oral Gel 15 Gram(s) Oral once PRN    VITALS:  T(F): 97.9  HR: 84  BP: 132/72  RR: 18  SpO2: 95%    <<<<<NEURO EXAM>>>>>  General:  Appearance is consistent with chronologic age.  No abnormal facies.  Gross skin survey within normal limits.    Cognitive/Language:  The patient is oriented to person, place, time and date.  Recent and remote memory intact.  Fund of knowledge is intact and normal.  Language with normal repetition, comprehension and naming.  Nondysarthric.    Eyes: intact VA, VFF.  EOMI w/o nystagmus, skew or reported double vision.  PERRL.  No ptosis/weakness of eyelid closure.    Face:  Facial sensation normal V1 - 3, no facial asymmetry.    Ears/Nose/Throat:  Hearing grossly intact b/l.  Palate elevates midline.  Tongue and uvula midline.   Motor examination:   Normal tone, bulk and range of motion.  No tenderness, twitching, tremors or involuntary movements.  Strength Exam (MRC scale): 5/5 BL Shoulder abduction, Shoulder elevation, Elbow flexion, Elbow elevation, Wrist extension, Wrist flexion, Wrist Abduction, Wrist Adduction, Hand  strength, Hip flexion, Hip abduction, Hip adduction, Knee flexion, Knee extension, Ankle plantarflexion, Ankle dorsiflexion, Ankle inversion, Ankle eversion  Reflexes:   2+ b/l biceps, triceps, brachioradialis, patella and Achilles.  Plantar response downgoing b/l.  Jaw jerk, Laurie, clonus absent.  Sensory examination:   Intact to light touch and pinprick, pain, temperature and proprioception and vibration in all extremities.  Cerebellum:   FTN/HKS intact with normal TRAVIS in all limbs.  No dysmetria or dysdiadochokinesia.    Gait: Narrow based and normal.    NIHSS:      <<<<<LABS>>>>>                        9.6    3.13  )-----------( 173      ( 21 Mar 2025 05:48 )             28.8     03-21    142  |  105  |  30[H]  ----------------------------<  203[H]  4.8   |  24  |  1.9[H]    Ca    9.2      21 Mar 2025 05:48  Phos  3.9     03-21  Mg     1.6     03-21    TPro  6.0  /  Alb  4.0  /  TBili  0.4  /  DBili  x   /  AST  25  /  ALT  15  /  AlkPhos  137[H]  03-21    PT/INR - ( 20 Mar 2025 18:15 )   PT: 11.10 sec;   INR: 0.94 ratio         PTT - ( 20 Mar 2025 18:15 )  PTT:32.4 sec    ----------------------------------------------------------------------------------------------------------------------------------------------------------------------------------------------- ----------Daily Progress Note----------    Neurology Stroke Progress Note    INTERVAL HOSPITAL COURSE / OVERNIGHT EVENTS:  No overnight events, reports continued difficulty coordinating movement with her R arm and continues to have slurred speech, denies any new issues    <<<<<PAST MEDICAL & SURGICAL HISTORY>>>>>  HTN (hypertension)    History of TIAs    Hypothyroid    Arthritis    H/O total knee replacement      ALLERGIES  clindamycin (Rash)      Home Medications:  Budeprion  mg/12 hours oral tablet, extended release: 1 orally (21 Mar 2025 13:06)  clopidogrel 75 mg oral tablet: 1 orally (21 Mar 2025 13:06)  Cymbalta 60 mg oral delayed release capsule: 1 orally (21 Mar 2025 13:06)  finasteride 5 mg oral tablet: 1 orally (21 Mar 2025 13:06)  gabapentin 400 mg oral tablet: 1 tab(s) orally 2 times a day (21 Mar 2025 13:05)  HumaLOG Mix 75/25 Pen subcutaneous suspension: subcutaneous 2 times a day (20 Mar 2025 23:15)  levothyroxine 150 mcg (0.15 mg) oral tablet: 1 orally (21 Mar 2025 13:06)  lisinopril-hydrochlorothiazide 20 mg-25 mg oral tablet: 1 tab(s) orally 2 times a day (20 Mar 2025 23:16)  rosuvastatin 20 mg oral capsule: 1 orally (21 Mar 2025 13:06)        MEDICATIONS  STANDING MEDICATIONS  aspirin  chewable 81 milliGRAM(s) Oral daily  atorvastatin 80 milliGRAM(s) Oral at bedtime  buPROPion XL (24-Hour) . 150 milliGRAM(s) Oral daily  dextrose 5%. 1000 milliLiter(s) IV Continuous <Continuous>  dextrose 5%. 1000 milliLiter(s) IV Continuous <Continuous>  dextrose 50% Injectable 25 Gram(s) IV Push once  dextrose 50% Injectable 12.5 Gram(s) IV Push once  dextrose 50% Injectable 25 Gram(s) IV Push once  DULoxetine 60 milliGRAM(s) Oral daily  enoxaparin Injectable 40 milliGRAM(s) SubCutaneous every 24 hours  finasteride 5 milliGRAM(s) Oral at bedtime  gabapentin 100 milliGRAM(s) Oral three times a day  glucagon  Injectable 1 milliGRAM(s) IntraMuscular once  insulin lispro (ADMELOG) corrective regimen sliding scale   SubCutaneous three times a day before meals  insulin NPH human recombinant 15 Unit(s) SubCutaneous two times a day  lactated ringers. 1000 milliLiter(s) IV Continuous <Continuous>  levothyroxine 150 MICROGram(s) Oral daily  sodium chloride 0.9%. 1000 milliLiter(s) IV Continuous <Continuous>  ticagrelor 90 milliGRAM(s) Oral two times a day    PRN MEDICATIONS  acetaminophen     Tablet .. 650 milliGRAM(s) Oral every 6 hours PRN  dextrose Oral Gel 15 Gram(s) Oral once PRN    VITALS:  T(F): 97.9  HR: 84  BP: 132/72  RR: 18  SpO2: 95%    <<<<<NEURO EXAM>>>>>  General:  Appearance is consistent with chronologic age.  No abnormal facies.  Gross skin survey within normal limits.    Cognitive/Language: The patient is oriented to person, place, time and date. Language with normal repetition, comprehension and naming. Mildly dysarthric  Eyes: VFF. EOMI w/o nystagmus or reported double vision. PERRL. No ptosis/weakness of eyelid closure.    Face: Facial sensation normal V1 - 3, no facial asymmetry.  Ears/Nose/Throat: Hearing grossly intact b/l. Palate elevates midline. Tongue and uvula midline.   Motor examination: Normal tone. No tremors or involuntary movements.  Strength Exam (MRC scale): 4+/5 RUE strength, 5/5 with rest of extremities.  Reflexes: 2+ b/l biceps and triceps reflexes, 1+ b/l brachioradialis, patella and Achilles reflexes. Plantar response downgoing b/l.  Sensory examination: Intact to light touch bilaterally, no extinction  Cerebellum: R FTN impaired, L FTN intact    Gait: Deferred    NIHSS: 2 (RUE dysmetria 1, dysarthria 1    <<<<<LABS>>>>>                        9.6    3.13  )-----------( 173      ( 21 Mar 2025 05:48 )             28.8     03-21    142  |  105  |  30[H]  ----------------------------<  203[H]  4.8   |  24  |  1.9[H]    Ca    9.2      21 Mar 2025 05:48  Phos  3.9     03-21  Mg     1.6     03-21    TPro  6.0  /  Alb  4.0  /  TBili  0.4  /  DBili  x   /  AST  25  /  ALT  15  /  AlkPhos  137[H]  03-21    PT/INR - ( 20 Mar 2025 18:15 )   PT: 11.10 sec;   INR: 0.94 ratio         PTT - ( 20 Mar 2025 18:15 )  PTT:32.4 sec    -----------------------------------------------------------------------------------------------------------------------------------------------------------------------------------------------

## 2025-03-23 LAB
ALBUMIN SERPL ELPH-MCNC: 3.6 G/DL — SIGNIFICANT CHANGE UP (ref 3.5–5.2)
ALP SERPL-CCNC: 124 U/L — HIGH (ref 30–115)
ALT FLD-CCNC: 15 U/L — SIGNIFICANT CHANGE UP (ref 0–41)
ANION GAP SERPL CALC-SCNC: 12 MMOL/L — SIGNIFICANT CHANGE UP (ref 7–14)
AST SERPL-CCNC: 26 U/L — SIGNIFICANT CHANGE UP (ref 0–41)
BASOPHILS # BLD AUTO: 0.01 K/UL — SIGNIFICANT CHANGE UP (ref 0–0.2)
BASOPHILS NFR BLD AUTO: 0.2 % — SIGNIFICANT CHANGE UP (ref 0–1)
BILIRUB SERPL-MCNC: 0.9 MG/DL — SIGNIFICANT CHANGE UP (ref 0.2–1.2)
BUN SERPL-MCNC: 32 MG/DL — HIGH (ref 10–20)
CALCIUM SERPL-MCNC: 9.4 MG/DL — SIGNIFICANT CHANGE UP (ref 8.4–10.5)
CHLORIDE SERPL-SCNC: 104 MMOL/L — SIGNIFICANT CHANGE UP (ref 98–110)
CO2 SERPL-SCNC: 24 MMOL/L — SIGNIFICANT CHANGE UP (ref 17–32)
CREAT SERPL-MCNC: 1.8 MG/DL — HIGH (ref 0.7–1.5)
EGFR: 29 ML/MIN/1.73M2 — LOW
EGFR: 29 ML/MIN/1.73M2 — LOW
EOSINOPHIL # BLD AUTO: 0.11 K/UL — SIGNIFICANT CHANGE UP (ref 0–0.7)
EOSINOPHIL NFR BLD AUTO: 2.3 % — SIGNIFICANT CHANGE UP (ref 0–8)
GLUCOSE BLDC GLUCOMTR-MCNC: 189 MG/DL — HIGH (ref 70–99)
GLUCOSE BLDC GLUCOMTR-MCNC: 222 MG/DL — HIGH (ref 70–99)
GLUCOSE BLDC GLUCOMTR-MCNC: 257 MG/DL — HIGH (ref 70–99)
GLUCOSE BLDC GLUCOMTR-MCNC: 285 MG/DL — HIGH (ref 70–99)
GLUCOSE SERPL-MCNC: 178 MG/DL — HIGH (ref 70–99)
HCT VFR BLD CALC: 26.6 % — LOW (ref 37–47)
HGB BLD-MCNC: 9 G/DL — LOW (ref 12–16)
IMM GRANULOCYTES NFR BLD AUTO: 0.4 % — HIGH (ref 0.1–0.3)
LYMPHOCYTES # BLD AUTO: 0.64 K/UL — LOW (ref 1.2–3.4)
LYMPHOCYTES # BLD AUTO: 13.4 % — LOW (ref 20.5–51.1)
MAGNESIUM SERPL-MCNC: 2.6 MG/DL — HIGH (ref 1.8–2.4)
MCHC RBC-ENTMCNC: 33.8 G/DL — SIGNIFICANT CHANGE UP (ref 32–37)
MCHC RBC-ENTMCNC: 37.8 PG — HIGH (ref 27–31)
MCV RBC AUTO: 111.8 FL — HIGH (ref 81–99)
MONOCYTES # BLD AUTO: 0.63 K/UL — HIGH (ref 0.1–0.6)
MONOCYTES NFR BLD AUTO: 13.2 % — HIGH (ref 1.7–9.3)
NEUTROPHILS # BLD AUTO: 3.36 K/UL — SIGNIFICANT CHANGE UP (ref 1.4–6.5)
NEUTROPHILS NFR BLD AUTO: 70.5 % — SIGNIFICANT CHANGE UP (ref 42.2–75.2)
NRBC BLD AUTO-RTO: 0 /100 WBCS — SIGNIFICANT CHANGE UP (ref 0–0)
PHOSPHATE SERPL-MCNC: 3.7 MG/DL — SIGNIFICANT CHANGE UP (ref 2.1–4.9)
PLATELET # BLD AUTO: 159 K/UL — SIGNIFICANT CHANGE UP (ref 130–400)
PMV BLD: 9.6 FL — SIGNIFICANT CHANGE UP (ref 7.4–10.4)
POTASSIUM SERPL-MCNC: 4.3 MMOL/L — SIGNIFICANT CHANGE UP (ref 3.5–5)
POTASSIUM SERPL-SCNC: 4.3 MMOL/L — SIGNIFICANT CHANGE UP (ref 3.5–5)
PROT SERPL-MCNC: 5.6 G/DL — LOW (ref 6–8)
RBC # BLD: 2.38 M/UL — LOW (ref 4.2–5.4)
RBC # FLD: 14.2 % — SIGNIFICANT CHANGE UP (ref 11.5–14.5)
SODIUM SERPL-SCNC: 140 MMOL/L — SIGNIFICANT CHANGE UP (ref 135–146)
WBC # BLD: 4.77 K/UL — LOW (ref 4.8–10.8)
WBC # FLD AUTO: 4.77 K/UL — LOW (ref 4.8–10.8)

## 2025-03-23 PROCEDURE — 99232 SBSQ HOSP IP/OBS MODERATE 35: CPT

## 2025-03-23 PROCEDURE — 71045 X-RAY EXAM CHEST 1 VIEW: CPT | Mod: 26

## 2025-03-23 PROCEDURE — 99233 SBSQ HOSP IP/OBS HIGH 50: CPT

## 2025-03-23 RX ADMIN — INSULIN GLARGINE-YFGN 15 UNIT(S): 100 INJECTION, SOLUTION SUBCUTANEOUS at 08:05

## 2025-03-23 RX ADMIN — Medication 30 MILLIGRAM(S): at 06:13

## 2025-03-23 RX ADMIN — TICAGRELOR 90 MILLIGRAM(S): 90 TABLET ORAL at 06:13

## 2025-03-23 RX ADMIN — FINASTERIDE 5 MILLIGRAM(S): 1 TABLET, FILM COATED ORAL at 21:46

## 2025-03-23 RX ADMIN — Medication 150 MICROGRAM(S): at 06:13

## 2025-03-23 RX ADMIN — GABAPENTIN 100 MILLIGRAM(S): 400 CAPSULE ORAL at 06:13

## 2025-03-23 RX ADMIN — GABAPENTIN 100 MILLIGRAM(S): 400 CAPSULE ORAL at 13:15

## 2025-03-23 RX ADMIN — ENOXAPARIN SODIUM 40 MILLIGRAM(S): 100 INJECTION SUBCUTANEOUS at 21:47

## 2025-03-23 RX ADMIN — TICAGRELOR 90 MILLIGRAM(S): 90 TABLET ORAL at 16:53

## 2025-03-23 RX ADMIN — INSULIN LISPRO 2: 100 INJECTION, SOLUTION INTRAVENOUS; SUBCUTANEOUS at 17:44

## 2025-03-23 RX ADMIN — BUPROPION HYDROBROMIDE 150 MILLIGRAM(S): 522 TABLET, EXTENDED RELEASE ORAL at 11:12

## 2025-03-23 RX ADMIN — INSULIN LISPRO 3: 100 INJECTION, SOLUTION INTRAVENOUS; SUBCUTANEOUS at 11:38

## 2025-03-23 RX ADMIN — GABAPENTIN 100 MILLIGRAM(S): 400 CAPSULE ORAL at 21:46

## 2025-03-23 RX ADMIN — ATORVASTATIN CALCIUM 80 MILLIGRAM(S): 80 TABLET, FILM COATED ORAL at 21:46

## 2025-03-23 RX ADMIN — Medication 81 MILLIGRAM(S): at 11:12

## 2025-03-23 RX ADMIN — INSULIN LISPRO 1: 100 INJECTION, SOLUTION INTRAVENOUS; SUBCUTANEOUS at 21:47

## 2025-03-23 RX ADMIN — Medication 650 MILLIGRAM(S): at 08:11

## 2025-03-23 RX ADMIN — DULOXETINE 60 MILLIGRAM(S): 20 CAPSULE, DELAYED RELEASE ORAL at 11:12

## 2025-03-23 RX ADMIN — INSULIN LISPRO 1: 100 INJECTION, SOLUTION INTRAVENOUS; SUBCUTANEOUS at 08:05

## 2025-03-23 NOTE — PROGRESS NOTE ADULT - SUBJECTIVE AND OBJECTIVE BOX
Patient is a 74y old  Female who presents with a chief complaint of stroke (23 Mar 2025 09:40)      Patient seen and examined at bedside.    ALLERGIES:  clindamycin (Rash)    MEDICATIONS:  acetaminophen     Tablet .. 650 milliGRAM(s) Oral every 6 hours PRN  aspirin  chewable 81 milliGRAM(s) Oral daily  atorvastatin 80 milliGRAM(s) Oral at bedtime  buPROPion XL (24-Hour) . 150 milliGRAM(s) Oral daily  dextrose 5%. 1000 milliLiter(s) IV Continuous <Continuous>  dextrose 5%. 1000 milliLiter(s) IV Continuous <Continuous>  dextrose 50% Injectable 25 Gram(s) IV Push once  dextrose 50% Injectable 12.5 Gram(s) IV Push once  dextrose 50% Injectable 25 Gram(s) IV Push once  dextrose Oral Gel 15 Gram(s) Oral once PRN  DULoxetine 60 milliGRAM(s) Oral daily  enoxaparin Injectable 40 milliGRAM(s) SubCutaneous every 24 hours  finasteride 5 milliGRAM(s) Oral at bedtime  gabapentin 100 milliGRAM(s) Oral three times a day  glucagon  Injectable 1 milliGRAM(s) IntraMuscular once  insulin glargine Injectable (LANTUS) 15 Unit(s) SubCutaneous every morning  insulin lispro (ADMELOG) corrective regimen sliding scale   SubCutaneous three times a day before meals  insulin lispro (ADMELOG) corrective regimen sliding scale   SubCutaneous at bedtime  levothyroxine 150 MICROGram(s) Oral daily  NIFEdipine XL 30 milliGRAM(s) Oral daily  ticagrelor 90 milliGRAM(s) Oral two times a day    Vital Signs Last 24 Hrs  T(F): 98 (23 Mar 2025 13:10), Max: 98 (23 Mar 2025 13:10)  HR: 78 (23 Mar 2025 13:10) (76 - 92)  BP: 131/60 (23 Mar 2025 13:10) (131/52 - 169/69)  RR: 16 (23 Mar 2025 13:10) (16 - 18)  SpO2: 100% (23 Mar 2025 13:10) (95% - 100%)  I&O's Summary      PHYSICAL EXAM:  General: NAD, A/O x 3  ENT: MMM  Neck: Supple, No JVD  Lungs: crackles L>R  Cardio: RRR, S1/S2, No murmurs  Abdomen: Soft, Nontender, Nondistended; obese  Extremities: No cyanosis, No edema    LABS:                        9.0    4.77  )-----------( 159      ( 23 Mar 2025 06:50 )             26.6     03-23    140  |  104  |  32  ----------------------------<  178  4.3   |  24  |  1.8    Ca    9.4      23 Mar 2025 06:50  Phos  3.7     03-23  Mg     2.6     03-23    TPro  5.6  /  Alb  3.6  /  TBili  0.9  /  DBili  x   /  AST  26  /  ALT  15  /  AlkPhos  124  03-23      PT/INR - ( 20 Mar 2025 18:15 )   PT: 11.10 sec;   INR: 0.94 ratio         PTT - ( 20 Mar 2025 18:15 )  PTT:32.4 sec        03-21 Chol 152 mg/dL LDL -- HDL 50 mg/dL Trig 139 mg/dL  TSH 1.08   TSH with FT4 reflex --  Total T3 --              POCT Blood Glucose.: 285 mg/dL (23 Mar 2025 11:31)  POCT Blood Glucose.: 189 mg/dL (23 Mar 2025 07:45)  POCT Blood Glucose.: 266 mg/dL (22 Mar 2025 22:20)  POCT Blood Glucose.: 163 mg/dL (22 Mar 2025 17:03)      Urinalysis Basic - ( 23 Mar 2025 06:50 )    Color: x / Appearance: x / SG: x / pH: x  Gluc: 178 mg/dL / Ketone: x  / Bili: x / Urobili: x   Blood: x / Protein: x / Nitrite: x   Leuk Esterase: x / RBC: x / WBC x   Sq Epi: x / Non Sq Epi: x / Bacteria: x            RADIOLOGY & ADDITIONAL TESTS:    Care Discussed with Consultants/Other Providers:

## 2025-03-23 NOTE — PROGRESS NOTE ADULT - ASSESSMENT
73yo woman PMH of aortic stenosis, hypertension, diabetes, CORTEZ, hypothyroidism, former smoker (quit 1996; still occasionally vapes medical marijuana) presenting with 1 day of R hand and leg weakness plus dysarthria, CTH with hypodensity in the right caudate head with small calcifications faintly evident on the last CT, CTA with near occlusion of left M1 MCA with moderate focal stenosis of the A2 segment of the right DIVINE, MRH confirms stroke subcortical L MCA territory suspect cardioembolic. Patient already taking plavix daily at baseline per primary care provider. PRU 3/21 234 suggesting plavix nonresponder.    #subcortical L MCA territory suspect cardioembolic  #HTN  - s/p DAPT load  - EP consult for ILR  - antiplatelets/anticoagulants: aspirin 81mg daily , clopidogrel 75mg daily > switch to brilinta 90mg twice daily   - antilipemics: atorvastatin 80mg nightly inpatient > discharge on rosuvastatin 40mg nightly   - SBP goal: 120-160  - q4h neuro and vitals checks  - holding home lisinopril- hydrochlorothiazide 20/25 mg twice daily due to CKD   - Started nifedipine 30 mg daily    #CKD baseline 2.3-3.4  - Cr improved from previous  - 75c LR 24 hours, stopped as per hospitalist recs    #CORTEZ  - follow up pulm outpatient for better fitting CPAP  - supplemental O2 at night    #Hypothyroid  - c/w home Synthroid 150mcg daily     #DM w/ neuropathy  - A1c 7.9  - holding home Humalog 75/25 ~45U twice daily   - hospitalist advising converting to insulin NPH 30U twice daily > will start on 15U twice daily and increase as tolerated/required  - insulin corrective sliding scale     #sciatica  #diabetic neuropathy  - gabapentin 400mg twice daily (home dose) > converted to 100mg three times daily per [facility]  - duloxetine 60mg daily     #hair loss  - finasteride 5mg daily 5mg nightly (home dose)     #Depression/Anxiety  - Bupropion SR 150mg daily > interchange to bupropion XL 150mg daily     --------------------------------------------------  #prophylactic measures  - DVT prophylaxis: lovenox / SCDs  - GI prophylaxis: not indicated at this time  - Diet: DASH/TLC / consistent carbohydrate  --------------------------------------------------  #disposition -   - PT / OT / Physiatry advises discharge to: pending   - patient planning to discharge to: pending 75yo woman PMH of aortic stenosis, hypertension, diabetes, CORTEZ, hypothyroidism, former smoker (quit 1996; still occasionally vapes medical marijuana) presenting with 1 day of R hand and leg weakness plus dysarthria, CTH with hypodensity in the right caudate head with small calcifications faintly evident on the last CT, CTA with near occlusion of left M1 MCA with moderate focal stenosis of the A2 segment of the right DIVINE, MRH confirms stroke subcortical L MCA territory suspect cardioembolic. Patient already taking plavix daily at baseline per primary care provider. PRU 3/21 234 suggesting plavix nonresponder.    #subcortical L MCA territory suspect cardioembolic  #HTN  - s/p DAPT load  - EP consult for ILR  - antiplatelets/anticoagulants: aspirin 81mg daily , clopidogrel 75mg daily > switch to brilinta 90mg twice daily   - antilipemics: atorvastatin 80mg nightly inpatient > discharge on rosuvastatin 40mg nightly   - SBP goal: 120-160  - q4h neuro and vitals checks  - holding home lisinopril- hydrochlorothiazide 20/25 mg twice daily due to CKD   - Started nifedipine 30 mg daily  - Pending loop recorder placement    #CKD baseline 2.3-3.4  - Cr improved from previous  - 75c LR 24 hours, stopped as per hospitalist recs    #CORTEZ  - follow up pulm outpatient for better fitting CPAP  - supplemental O2 at night    #Hypothyroid  - c/w home Synthroid 150mcg daily     #DM w/ neuropathy  - A1c 7.9  - holding home Humalog 75/25 ~45U twice daily   - hospitalist advising converting to insulin NPH 30U twice daily > will start on 15U twice daily and increase as tolerated/required  - insulin corrective sliding scale     #sciatica  #diabetic neuropathy  - gabapentin 400mg twice daily (home dose) > converted to 100mg three times daily per Cr[facility]  - duloxetine 60mg daily     #hair loss  - finasteride 5mg daily 5mg nightly (home dose)     #Depression/Anxiety  - Bupropion SR 150mg daily > interchange to bupropion XL 150mg daily     --------------------------------------------------  #prophylactic measures  - DVT prophylaxis: lovenox / SCDs  - GI prophylaxis: not indicated at this time  - Diet: DASH/TLC / consistent carbohydrate  --------------------------------------------------  #disposition -   - PT / OT / Physiatry advises discharge to: pending   - patient planning to discharge to: pending

## 2025-03-23 NOTE — PROGRESS NOTE ADULT - SUBJECTIVE AND OBJECTIVE BOX
Neurology Stroke Progress Note    INTERVAL HPI/OVERNIGHT EVENTS:  Patient seen and examined.  number ______ used.    MEDICATIONS  (STANDING):  aspirin  chewable 81 milliGRAM(s) Oral daily  atorvastatin 80 milliGRAM(s) Oral at bedtime  buPROPion XL (24-Hour) . 150 milliGRAM(s) Oral daily  dextrose 5%. 1000 milliLiter(s) (50 mL/Hr) IV Continuous <Continuous>  dextrose 5%. 1000 milliLiter(s) (100 mL/Hr) IV Continuous <Continuous>  dextrose 50% Injectable 25 Gram(s) IV Push once  dextrose 50% Injectable 12.5 Gram(s) IV Push once  dextrose 50% Injectable 25 Gram(s) IV Push once  DULoxetine 60 milliGRAM(s) Oral daily  enoxaparin Injectable 40 milliGRAM(s) SubCutaneous every 24 hours  finasteride 5 milliGRAM(s) Oral at bedtime  gabapentin 100 milliGRAM(s) Oral three times a day  glucagon  Injectable 1 milliGRAM(s) IntraMuscular once  insulin glargine Injectable (LANTUS) 15 Unit(s) SubCutaneous every morning  insulin lispro (ADMELOG) corrective regimen sliding scale   SubCutaneous three times a day before meals  insulin lispro (ADMELOG) corrective regimen sliding scale   SubCutaneous at bedtime  levothyroxine 150 MICROGram(s) Oral daily  NIFEdipine XL 30 milliGRAM(s) Oral daily  ticagrelor 90 milliGRAM(s) Oral two times a day    MEDICATIONS  (PRN):  acetaminophen     Tablet .. 650 milliGRAM(s) Oral every 6 hours PRN Temp greater or equal to 38C (100.4F), Mild Pain (1 - 3)  dextrose Oral Gel 15 Gram(s) Oral once PRN Blood Glucose LESS THAN 70 milliGRAM(s)/deciliter    Allergies    clindamycin (Rash)    Intolerances      Vital Signs Last 24 Hrs  T(C): 36.2 (23 Mar 2025 05:02), Max: 36.6 (22 Mar 2025 20:00)  T(F): 97.1 (23 Mar 2025 05:02), Max: 97.9 (23 Mar 2025 00:00)  HR: 85 (23 Mar 2025 05:02) (76 - 92)  BP: 136/59 (23 Mar 2025 05:02) (131/52 - 169/69)  BP(mean): 85 (23 Mar 2025 05:02) (74 - 85)  RR: 18 (23 Mar 2025 05:02) (18 - 18)  SpO2: 95% (23 Mar 2025 05:02) (95% - 99%)    Parameters below as of 23 Mar 2025 05:02  Patient On (Oxygen Delivery Method): room air        Physical exam:  General: No acute distress, awake and alert  Eyes: Anicteric sclerae, moist conjunctivae, see below for CNs  Neck: trachea midline, FROM, supple, no thyromegaly or lymphadenopathy  Cardiovascular: Regular rate and rhythm, no murmurs, rubs, or gallops. No carotid bruits.   Pulmonary: Anterior breath sounds clear bilaterally, no crackles or wheezing. No use of accessory muscles  GI: Abdomen soft, non-distended, non-tender  Extremities: Radial and DP pulses +2, no edema    Neurologic:  -Mental status: Awake, alert, oriented to person, place, and time. Speech is fluent with intact naming, repetition, and comprehension, no dysarthria. Recent and remote memory intact. Follows commands. Attention/concentration intact. Fund of knowledge appropriate.  -Cranial nerves:   II: Visual fields are full to confrontation.  III, IV, VI: Extraocular movements are intact without nystagmus. Pupils equally round and reactive to light  V:  Facial sensation V1-V3 equal and intact   VII: Face is symmetric with normal eye closure and smile  VIII: Hearing is bilaterally intact to finger rub  IX, X: Uvula is midline and soft palate rises symmetrically  XI: Head turning and shoulder shrug are intact.  XII: Tongue protrudes midline  Motor: Normal bulk and tone. No pronator drift. Strength bilateral upper extremity 5/5, bilateral lower extremities 5/5.  Rapid alternating movements intact and symmetric  Sensation: Intact to light touch bilaterally. No neglect or extinction on double simultaneous testing.  Coordination: No dysmetria on finger-to-nose and heel-to-shin bilaterally  Reflexes: Downgoing toes bilaterally   Gait: Narrow gait and steady    LABS:                        9.0    4.77  )-----------( 159      ( 23 Mar 2025 06:50 )             26.6     03-23    140  |  104  |  32[H]  ----------------------------<  178[H]  4.3   |  24  |  1.8[H]    Ca    9.4      23 Mar 2025 06:50  Phos  3.7     03-23  Mg     2.6     03-23    TPro  5.6[L]  /  Alb  3.6  /  TBili  0.9  /  DBili  x   /  AST  26  /  ALT  15  /  AlkPhos  124[H]  03-23      Urinalysis Basic - ( 23 Mar 2025 06:50 )    Color: x / Appearance: x / SG: x / pH: x  Gluc: 178 mg/dL / Ketone: x  / Bili: x / Urobili: x   Blood: x / Protein: x / Nitrite: x   Leuk Esterase: x / RBC: x / WBC x   Sq Epi: x / Non Sq Epi: x / Bacteria: x        RADIOLOGY & ADDITIONAL TESTS:     Neurology Stroke Progress Note    INTERVAL HPI/OVERNIGHT EVENTS:  Patient seen and examined. No acute events overnight.    MEDICATIONS  (STANDING):  aspirin  chewable 81 milliGRAM(s) Oral daily  atorvastatin 80 milliGRAM(s) Oral at bedtime  buPROPion XL (24-Hour) . 150 milliGRAM(s) Oral daily  dextrose 5%. 1000 milliLiter(s) (50 mL/Hr) IV Continuous <Continuous>  dextrose 5%. 1000 milliLiter(s) (100 mL/Hr) IV Continuous <Continuous>  dextrose 50% Injectable 25 Gram(s) IV Push once  dextrose 50% Injectable 12.5 Gram(s) IV Push once  dextrose 50% Injectable 25 Gram(s) IV Push once  DULoxetine 60 milliGRAM(s) Oral daily  enoxaparin Injectable 40 milliGRAM(s) SubCutaneous every 24 hours  finasteride 5 milliGRAM(s) Oral at bedtime  gabapentin 100 milliGRAM(s) Oral three times a day  glucagon  Injectable 1 milliGRAM(s) IntraMuscular once  insulin glargine Injectable (LANTUS) 15 Unit(s) SubCutaneous every morning  insulin lispro (ADMELOG) corrective regimen sliding scale   SubCutaneous three times a day before meals  insulin lispro (ADMELOG) corrective regimen sliding scale   SubCutaneous at bedtime  levothyroxine 150 MICROGram(s) Oral daily  NIFEdipine XL 30 milliGRAM(s) Oral daily  ticagrelor 90 milliGRAM(s) Oral two times a day    MEDICATIONS  (PRN):  acetaminophen     Tablet .. 650 milliGRAM(s) Oral every 6 hours PRN Temp greater or equal to 38C (100.4F), Mild Pain (1 - 3)  dextrose Oral Gel 15 Gram(s) Oral once PRN Blood Glucose LESS THAN 70 milliGRAM(s)/deciliter    Allergies    clindamycin (Rash)    Intolerances      Vital Signs Last 24 Hrs  T(C): 36.2 (23 Mar 2025 05:02), Max: 36.6 (22 Mar 2025 20:00)  T(F): 97.1 (23 Mar 2025 05:02), Max: 97.9 (23 Mar 2025 00:00)  HR: 85 (23 Mar 2025 05:02) (76 - 92)  BP: 136/59 (23 Mar 2025 05:02) (131/52 - 169/69)  BP(mean): 85 (23 Mar 2025 05:02) (74 - 85)  RR: 18 (23 Mar 2025 05:02) (18 - 18)  SpO2: 95% (23 Mar 2025 05:02) (95% - 99%)    Parameters below as of 23 Mar 2025 05:02  Patient On (Oxygen Delivery Method): room air        Physical exam:  General: No acute distress, awake and alert  Eyes: Anicteric sclerae, moist conjunctivae, see below for CNs  Neck: trachea midline, FROM, supple, no thyromegaly or lymphadenopathy  Cardiovascular: Regular rate and rhythm, no murmurs, rubs, or gallops. No carotid bruits.   Pulmonary: Anterior breath sounds clear bilaterally, no crackles or wheezing. No use of accessory muscles  GI: Abdomen soft, non-distended, non-tender  Extremities: Radial and DP pulses +2, no edema    Neurologic:      LABS:                        9.0    4.77  )-----------( 159      ( 23 Mar 2025 06:50 )             26.6     03-23    140  |  104  |  32[H]  ----------------------------<  178[H]  4.3   |  24  |  1.8[H]    Ca    9.4      23 Mar 2025 06:50  Phos  3.7     03-23  Mg     2.6     03-23    TPro  5.6[L]  /  Alb  3.6  /  TBili  0.9  /  DBili  x   /  AST  26  /  ALT  15  /  AlkPhos  124[H]  03-23      Urinalysis Basic - ( 23 Mar 2025 06:50 )    Color: x / Appearance: x / SG: x / pH: x  Gluc: 178 mg/dL / Ketone: x  / Bili: x / Urobili: x   Blood: x / Protein: x / Nitrite: x   Leuk Esterase: x / RBC: x / WBC x   Sq Epi: x / Non Sq Epi: x / Bacteria: x        RADIOLOGY & ADDITIONAL TESTS:     Neurology Stroke Progress Note    INTERVAL HPI/OVERNIGHT EVENTS:  Patient seen and examined. No acute events overnight.    MEDICATIONS  (STANDING):  aspirin  chewable 81 milliGRAM(s) Oral daily  atorvastatin 80 milliGRAM(s) Oral at bedtime  buPROPion XL (24-Hour) . 150 milliGRAM(s) Oral daily  dextrose 5%. 1000 milliLiter(s) (50 mL/Hr) IV Continuous <Continuous>  dextrose 5%. 1000 milliLiter(s) (100 mL/Hr) IV Continuous <Continuous>  dextrose 50% Injectable 25 Gram(s) IV Push once  dextrose 50% Injectable 12.5 Gram(s) IV Push once  dextrose 50% Injectable 25 Gram(s) IV Push once  DULoxetine 60 milliGRAM(s) Oral daily  enoxaparin Injectable 40 milliGRAM(s) SubCutaneous every 24 hours  finasteride 5 milliGRAM(s) Oral at bedtime  gabapentin 100 milliGRAM(s) Oral three times a day  glucagon  Injectable 1 milliGRAM(s) IntraMuscular once  insulin glargine Injectable (LANTUS) 15 Unit(s) SubCutaneous every morning  insulin lispro (ADMELOG) corrective regimen sliding scale   SubCutaneous three times a day before meals  insulin lispro (ADMELOG) corrective regimen sliding scale   SubCutaneous at bedtime  levothyroxine 150 MICROGram(s) Oral daily  NIFEdipine XL 30 milliGRAM(s) Oral daily  ticagrelor 90 milliGRAM(s) Oral two times a day    MEDICATIONS  (PRN):  acetaminophen     Tablet .. 650 milliGRAM(s) Oral every 6 hours PRN Temp greater or equal to 38C (100.4F), Mild Pain (1 - 3)  dextrose Oral Gel 15 Gram(s) Oral once PRN Blood Glucose LESS THAN 70 milliGRAM(s)/deciliter    Allergies    clindamycin (Rash)    Intolerances      Vital Signs Last 24 Hrs  T(C): 36.2 (23 Mar 2025 05:02), Max: 36.6 (22 Mar 2025 20:00)  T(F): 97.1 (23 Mar 2025 05:02), Max: 97.9 (23 Mar 2025 00:00)  HR: 85 (23 Mar 2025 05:02) (76 - 92)  BP: 136/59 (23 Mar 2025 05:02) (131/52 - 169/69)  BP(mean): 85 (23 Mar 2025 05:02) (74 - 85)  RR: 18 (23 Mar 2025 05:02) (18 - 18)  SpO2: 95% (23 Mar 2025 05:02) (95% - 99%)    Parameters below as of 23 Mar 2025 05:02  Patient On (Oxygen Delivery Method): room air        Physical exam:  General:  Appearance is consistent with chronologic age.  No abnormal facies.  Gross skin survey within normal limits.    Cognitive/Language: The patient is oriented to person, place, time and date. Language with normal repetition, comprehension and naming. Mildly dysarthric  Eyes: VFF. EOMI w/o nystagmus or reported double vision. PERRL. No ptosis/weakness of eyelid closure.    Face: Facial sensation normal V1 - 3, no facial asymmetry.  Ears/Nose/Throat: Hearing grossly intact b/l. Palate elevates midline. Tongue and uvula midline.   Motor examination: Normal tone. No tremors or involuntary movements.  Strength Exam (MRC scale): 4+/5 RUE strength, 5/5 with rest of extremities.  Reflexes: 2+ b/l biceps and triceps reflexes, 1+ b/l brachioradialis, patella and Achilles reflexes. Plantar response downgoing b/l.  Sensory examination: Intact to light touch bilaterally, no extinction  Cerebellum: R FTN impaired, L FTN intact    Gait: Deferred    NIHSS: 2 (RUE dysmetria 1, dysarthria 1    Neurologic:      LABS:                        9.0    4.77  )-----------( 159      ( 23 Mar 2025 06:50 )             26.6     03-23    140  |  104  |  32[H]  ----------------------------<  178[H]  4.3   |  24  |  1.8[H]    Ca    9.4      23 Mar 2025 06:50  Phos  3.7     03-23  Mg     2.6     03-23    TPro  5.6[L]  /  Alb  3.6  /  TBili  0.9  /  DBili  x   /  AST  26  /  ALT  15  /  AlkPhos  124[H]  03-23      Urinalysis Basic - ( 23 Mar 2025 06:50 )    Color: x / Appearance: x / SG: x / pH: x  Gluc: 178 mg/dL / Ketone: x  / Bili: x / Urobili: x   Blood: x / Protein: x / Nitrite: x   Leuk Esterase: x / RBC: x / WBC x   Sq Epi: x / Non Sq Epi: x / Bacteria: x        RADIOLOGY & ADDITIONAL TESTS:     Neurology Stroke Progress Note    INTERVAL HPI/OVERNIGHT EVENTS:  Patient seen and examined. No reported acute events overnight. Reports continued difficulty coordinating movement with her R arm and continues to have slurred speech, denies any new issues.    MEDICATIONS  (STANDING):  aspirin  chewable 81 milliGRAM(s) Oral daily  atorvastatin 80 milliGRAM(s) Oral at bedtime  buPROPion XL (24-Hour) . 150 milliGRAM(s) Oral daily  dextrose 5%. 1000 milliLiter(s) (50 mL/Hr) IV Continuous <Continuous>  dextrose 5%. 1000 milliLiter(s) (100 mL/Hr) IV Continuous <Continuous>  dextrose 50% Injectable 25 Gram(s) IV Push once  dextrose 50% Injectable 12.5 Gram(s) IV Push once  dextrose 50% Injectable 25 Gram(s) IV Push once  DULoxetine 60 milliGRAM(s) Oral daily  enoxaparin Injectable 40 milliGRAM(s) SubCutaneous every 24 hours  finasteride 5 milliGRAM(s) Oral at bedtime  gabapentin 100 milliGRAM(s) Oral three times a day  glucagon  Injectable 1 milliGRAM(s) IntraMuscular once  insulin glargine Injectable (LANTUS) 15 Unit(s) SubCutaneous every morning  insulin lispro (ADMELOG) corrective regimen sliding scale   SubCutaneous three times a day before meals  insulin lispro (ADMELOG) corrective regimen sliding scale   SubCutaneous at bedtime  levothyroxine 150 MICROGram(s) Oral daily  NIFEdipine XL 30 milliGRAM(s) Oral daily  ticagrelor 90 milliGRAM(s) Oral two times a day    MEDICATIONS  (PRN):  acetaminophen     Tablet .. 650 milliGRAM(s) Oral every 6 hours PRN Temp greater or equal to 38C (100.4F), Mild Pain (1 - 3)  dextrose Oral Gel 15 Gram(s) Oral once PRN Blood Glucose LESS THAN 70 milliGRAM(s)/deciliter    Allergies    clindamycin (Rash)    Intolerances      Vital Signs Last 24 Hrs  T(C): 36.2 (23 Mar 2025 05:02), Max: 36.6 (22 Mar 2025 20:00)  T(F): 97.1 (23 Mar 2025 05:02), Max: 97.9 (23 Mar 2025 00:00)  HR: 85 (23 Mar 2025 05:02) (76 - 92)  BP: 136/59 (23 Mar 2025 05:02) (131/52 - 169/69)  BP(mean): 85 (23 Mar 2025 05:02) (74 - 85)  RR: 18 (23 Mar 2025 05:02) (18 - 18)  SpO2: 95% (23 Mar 2025 05:02) (95% - 99%)    Parameters below as of 23 Mar 2025 05:02  Patient On (Oxygen Delivery Method): room air        Physical exam:  General:  Appearance is consistent with chronologic age.  No abnormal facies.  Gross skin survey within normal limits.    Cognitive/Language: The patient is oriented to person, place, time and date. Language with normal repetition, comprehension and naming. Mildly dysarthric  Eyes: VFF. EOMI w/o nystagmus or reported double vision. PERRL. No ptosis/weakness of eyelid closure.    Face: Facial sensation normal V1 - 3, no facial asymmetry.  Ears/Nose/Throat: Hearing grossly intact b/l. Palate elevates midline. Tongue and uvula midline.   Motor examination: Normal tone. No tremors or involuntary movements.  Strength Exam (MRC scale): 4+/5 RUE strength, 5/5 with rest of extremities.  Reflexes: 2+ b/l biceps and triceps reflexes, 1+ b/l brachioradialis, patella and Achilles reflexes. Plantar response downgoing b/l.  Sensory examination: Intact to light touch bilaterally, no extinction  Cerebellum: R FTN impaired, L FTN intact    Gait: Deferred    NIHSS: 2 (RUE dysmetria 1, dysarthria 1)    Neurologic:      LABS:                        9.0    4.77  )-----------( 159      ( 23 Mar 2025 06:50 )             26.6     03-23    140  |  104  |  32[H]  ----------------------------<  178[H]  4.3   |  24  |  1.8[H]    Ca    9.4      23 Mar 2025 06:50  Phos  3.7     03-23  Mg     2.6     03-23    TPro  5.6[L]  /  Alb  3.6  /  TBili  0.9  /  DBili  x   /  AST  26  /  ALT  15  /  AlkPhos  124[H]  03-23      Urinalysis Basic - ( 23 Mar 2025 06:50 )    Color: x / Appearance: x / SG: x / pH: x  Gluc: 178 mg/dL / Ketone: x  / Bili: x / Urobili: x   Blood: x / Protein: x / Nitrite: x   Leuk Esterase: x / RBC: x / WBC x   Sq Epi: x / Non Sq Epi: x / Bacteria: x        RADIOLOGY & ADDITIONAL TESTS:     Neurology Stroke Progress Note    INTERVAL HPI/OVERNIGHT EVENTS:  Patient seen and examined. No reported acute events overnight. Reports continued difficulty coordinating movement with her R arm and continues to have slurred speech, denies any new issues.    MEDICATIONS  (STANDING):  aspirin  chewable 81 milliGRAM(s) Oral daily  atorvastatin 80 milliGRAM(s) Oral at bedtime  buPROPion XL (24-Hour) . 150 milliGRAM(s) Oral daily  dextrose 5%. 1000 milliLiter(s) (50 mL/Hr) IV Continuous <Continuous>  dextrose 5%. 1000 milliLiter(s) (100 mL/Hr) IV Continuous <Continuous>  dextrose 50% Injectable 25 Gram(s) IV Push once  dextrose 50% Injectable 12.5 Gram(s) IV Push once  dextrose 50% Injectable 25 Gram(s) IV Push once  DULoxetine 60 milliGRAM(s) Oral daily  enoxaparin Injectable 40 milliGRAM(s) SubCutaneous every 24 hours  finasteride 5 milliGRAM(s) Oral at bedtime  gabapentin 100 milliGRAM(s) Oral three times a day  glucagon  Injectable 1 milliGRAM(s) IntraMuscular once  insulin glargine Injectable (LANTUS) 15 Unit(s) SubCutaneous every morning  insulin lispro (ADMELOG) corrective regimen sliding scale   SubCutaneous three times a day before meals  insulin lispro (ADMELOG) corrective regimen sliding scale   SubCutaneous at bedtime  levothyroxine 150 MICROGram(s) Oral daily  NIFEdipine XL 30 milliGRAM(s) Oral daily  ticagrelor 90 milliGRAM(s) Oral two times a day    MEDICATIONS  (PRN):  acetaminophen     Tablet .. 650 milliGRAM(s) Oral every 6 hours PRN Temp greater or equal to 38C (100.4F), Mild Pain (1 - 3)  dextrose Oral Gel 15 Gram(s) Oral once PRN Blood Glucose LESS THAN 70 milliGRAM(s)/deciliter    Allergies    clindamycin (Rash)    Intolerances      Vital Signs Last 24 Hrs  T(C): 36.2 (23 Mar 2025 05:02), Max: 36.6 (22 Mar 2025 20:00)  T(F): 97.1 (23 Mar 2025 05:02), Max: 97.9 (23 Mar 2025 00:00)  HR: 85 (23 Mar 2025 05:02) (76 - 92)  BP: 136/59 (23 Mar 2025 05:02) (131/52 - 169/69)  BP(mean): 85 (23 Mar 2025 05:02) (74 - 85)  RR: 18 (23 Mar 2025 05:02) (18 - 18)  SpO2: 95% (23 Mar 2025 05:02) (95% - 99%)    Parameters below as of 23 Mar 2025 05:02  Patient On (Oxygen Delivery Method): room air        Physical exam:  General:  Appearance is consistent with chronologic age.  No abnormal facies.  Gross skin survey within normal limits.    Cognitive/Language: The patient is oriented to person, place, time and date. Language with normal repetition, comprehension and naming. Mildly dysarthric  Eyes: VFF. EOMI w/o nystagmus or reported double vision. PERRL. No ptosis/weakness of eyelid closure.    Face: Facial sensation normal V1 - 3, no facial asymmetry.  Ears/Nose/Throat: Hearing grossly intact b/l. Palate elevates midline. Tongue and uvula midline.   Motor examination: Normal tone. No tremors or involuntary movements.  Strength Exam (MRC scale): 4+/5 RUE strength, 5/5 with rest of extremities.  Reflexes: 2+ b/l biceps and triceps reflexes, 1+ b/l brachioradialis, patella and Achilles reflexes. Plantar response downgoing b/l.  Sensory examination: Intact to light touch bilaterally, no extinction  Cerebellum: R FTN impaired, L FTN intact    Gait: Deferred    NIHSS: 2 (RUE dysmetria 1, dysarthria 1)    Neurologic:      LABS:                        9.0    4.77  )-----------( 159      ( 23 Mar 2025 06:50 )             26.6     03-23    140  |  104  |  32[H]  ----------------------------<  178[H]  4.3   |  24  |  1.8[H]    Ca    9.4      23 Mar 2025 06:50  Phos  3.7     03-23  Mg     2.6     03-23    TPro  5.6[L]  /  Alb  3.6  /  TBili  0.9  /  DBili  x   /  AST  26  /  ALT  15  /  AlkPhos  124[H]  03-23      Urinalysis Basic - ( 23 Mar 2025 06:50 )    Color: x / Appearance: x / SG: x / pH: x  Gluc: 178 mg/dL / Ketone: x  / Bili: x / Urobili: x   Blood: x / Protein: x / Nitrite: x   Leuk Esterase: x / RBC: x / WBC x   Sq Epi: x / Non Sq Epi: x / Bacteria: x        RADIOLOGY & ADDITIONAL TESTS:  CT PERFUSION 03/20:  No perfusion deficits to suggest areas of completed infarction or at risk   territory.    CTA HEAD/NECK 03/20:  Near complete stenosis of the left M1 MCA with normal flow seen distally.    Moderate focal stenosis of the A2 segment of the right DIVINE.    Other areas of atherosclerotic disease described above.    A callback was requested. Findings were discussed by Dr. Barron with SUJEY Wahl at 7:30 pm on 3/20/2025 with readback       Neurology Stroke Progress Note    INTERVAL HPI/OVERNIGHT EVENTS:  Patient seen and examined. No reported acute events overnight. Reports continued difficulty coordinating movement with her R arm and continues to have slurred speech, denies any new issues.    MEDICATIONS  (STANDING):  aspirin  chewable 81 milliGRAM(s) Oral daily  atorvastatin 80 milliGRAM(s) Oral at bedtime  buPROPion XL (24-Hour) . 150 milliGRAM(s) Oral daily  dextrose 5%. 1000 milliLiter(s) (50 mL/Hr) IV Continuous <Continuous>  dextrose 5%. 1000 milliLiter(s) (100 mL/Hr) IV Continuous <Continuous>  dextrose 50% Injectable 25 Gram(s) IV Push once  dextrose 50% Injectable 12.5 Gram(s) IV Push once  dextrose 50% Injectable 25 Gram(s) IV Push once  DULoxetine 60 milliGRAM(s) Oral daily  enoxaparin Injectable 40 milliGRAM(s) SubCutaneous every 24 hours  finasteride 5 milliGRAM(s) Oral at bedtime  gabapentin 100 milliGRAM(s) Oral three times a day  glucagon  Injectable 1 milliGRAM(s) IntraMuscular once  insulin glargine Injectable (LANTUS) 15 Unit(s) SubCutaneous every morning  insulin lispro (ADMELOG) corrective regimen sliding scale   SubCutaneous three times a day before meals  insulin lispro (ADMELOG) corrective regimen sliding scale   SubCutaneous at bedtime  levothyroxine 150 MICROGram(s) Oral daily  NIFEdipine XL 30 milliGRAM(s) Oral daily  ticagrelor 90 milliGRAM(s) Oral two times a day    MEDICATIONS  (PRN):  acetaminophen     Tablet .. 650 milliGRAM(s) Oral every 6 hours PRN Temp greater or equal to 38C (100.4F), Mild Pain (1 - 3)  dextrose Oral Gel 15 Gram(s) Oral once PRN Blood Glucose LESS THAN 70 milliGRAM(s)/deciliter    Allergies    clindamycin (Rash)    Intolerances      Vital Signs Last 24 Hrs  T(C): 36.2 (23 Mar 2025 05:02), Max: 36.6 (22 Mar 2025 20:00)  T(F): 97.1 (23 Mar 2025 05:02), Max: 97.9 (23 Mar 2025 00:00)  HR: 85 (23 Mar 2025 05:02) (76 - 92)  BP: 136/59 (23 Mar 2025 05:02) (131/52 - 169/69)  BP(mean): 85 (23 Mar 2025 05:02) (74 - 85)  RR: 18 (23 Mar 2025 05:02) (18 - 18)  SpO2: 95% (23 Mar 2025 05:02) (95% - 99%)    Parameters below as of 23 Mar 2025 05:02  Patient On (Oxygen Delivery Method): room air        Physical exam:  General:  Appearance is consistent with chronologic age.  No abnormal facies.  Gross skin survey within normal limits.    Cognitive/Language: The patient is oriented to person, place, time and date. Language with normal repetition, comprehension and naming. Mildly dysarthric  Eyes: VFF. EOMI w/o nystagmus or reported double vision. PERRL. No ptosis/weakness of eyelid closure.    Face: Facial sensation normal V1 - 3, no facial asymmetry.  Ears/Nose/Throat: Hearing grossly intact b/l. Palate elevates midline. Tongue and uvula midline.   Motor examination: Normal tone. No tremors or involuntary movements.  Strength Exam (MRC scale): 4+/5 RUE strength, 5/5 with rest of extremities.  Reflexes: 2+ b/l biceps and triceps reflexes, 1+ b/l brachioradialis, patella and Achilles reflexes. Plantar response downgoing b/l.  Sensory examination: Intact to light touch bilaterally, no extinction  Cerebellum: R FTN impaired, L FTN intact    Gait: Deferred    NIHSS: 2 (RUE dysmetria 1, dysarthria 1)    Neurologic:      LABS:                        9.0    4.77  )-----------( 159      ( 23 Mar 2025 06:50 )             26.6     03-23    140  |  104  |  32[H]  ----------------------------<  178[H]  4.3   |  24  |  1.8[H]    Ca    9.4      23 Mar 2025 06:50  Phos  3.7     03-23  Mg     2.6     03-23    TPro  5.6[L]  /  Alb  3.6  /  TBili  0.9  /  DBili  x   /  AST  26  /  ALT  15  /  AlkPhos  124[H]  03-23      Urinalysis Basic - ( 23 Mar 2025 06:50 )    Color: x / Appearance: x / SG: x / pH: x  Gluc: 178 mg/dL / Ketone: x  / Bili: x / Urobili: x   Blood: x / Protein: x / Nitrite: x   Leuk Esterase: x / RBC: x / WBC x   Sq Epi: x / Non Sq Epi: x / Bacteria: x        RADIOLOGY & ADDITIONAL TESTS:  CT PERFUSION 03/20:  No perfusion deficits to suggest areas of completed infarction or at risk   territory.    CTA HEAD/NECK 03/20:  Near complete stenosis of the left M1 MCA with normal flow seen distally.    Moderate focal stenosis of the A2 segment of the right DIVINE.    Other areas of atherosclerotic disease described above.    A callback was requested. Findings were discussed by Dr. Barron with SUJEY Wahl at 7:30 pm on 3/20/2025 with readback    MRI 03/2  1.  Acute left corona radiata/basal ganglia/p posterior limb/internal   capsule infarct    2.  Chronic right corona radiata infarct    3.  Moderate microvascular ischemic changes.    4.  Focal area left MCA correspond to severe stenosis.     Neurology Stroke Progress Note    INTERVAL HPI/OVERNIGHT EVENTS:  Patient seen and examined. No reported acute events overnight. Reports continued difficulty coordinating movement with her R arm and continues to have slurred speech, denies any new issues.    MEDICATIONS  (STANDING):  aspirin  chewable 81 milliGRAM(s) Oral daily  atorvastatin 80 milliGRAM(s) Oral at bedtime  buPROPion XL (24-Hour) . 150 milliGRAM(s) Oral daily  dextrose 5%. 1000 milliLiter(s) (50 mL/Hr) IV Continuous <Continuous>  dextrose 5%. 1000 milliLiter(s) (100 mL/Hr) IV Continuous <Continuous>  dextrose 50% Injectable 25 Gram(s) IV Push once  dextrose 50% Injectable 12.5 Gram(s) IV Push once  dextrose 50% Injectable 25 Gram(s) IV Push once  DULoxetine 60 milliGRAM(s) Oral daily  enoxaparin Injectable 40 milliGRAM(s) SubCutaneous every 24 hours  finasteride 5 milliGRAM(s) Oral at bedtime  gabapentin 100 milliGRAM(s) Oral three times a day  glucagon  Injectable 1 milliGRAM(s) IntraMuscular once  insulin glargine Injectable (LANTUS) 15 Unit(s) SubCutaneous every morning  insulin lispro (ADMELOG) corrective regimen sliding scale   SubCutaneous three times a day before meals  insulin lispro (ADMELOG) corrective regimen sliding scale   SubCutaneous at bedtime  levothyroxine 150 MICROGram(s) Oral daily  NIFEdipine XL 30 milliGRAM(s) Oral daily  ticagrelor 90 milliGRAM(s) Oral two times a day    MEDICATIONS  (PRN):  acetaminophen     Tablet .. 650 milliGRAM(s) Oral every 6 hours PRN Temp greater or equal to 38C (100.4F), Mild Pain (1 - 3)  dextrose Oral Gel 15 Gram(s) Oral once PRN Blood Glucose LESS THAN 70 milliGRAM(s)/deciliter    Allergies    clindamycin (Rash)    Intolerances      Vital Signs Last 24 Hrs  T(C): 36.2 (23 Mar 2025 05:02), Max: 36.6 (22 Mar 2025 20:00)  T(F): 97.1 (23 Mar 2025 05:02), Max: 97.9 (23 Mar 2025 00:00)  HR: 85 (23 Mar 2025 05:02) (76 - 92)  BP: 136/59 (23 Mar 2025 05:02) (131/52 - 169/69)  BP(mean): 85 (23 Mar 2025 05:02) (74 - 85)  RR: 18 (23 Mar 2025 05:02) (18 - 18)  SpO2: 95% (23 Mar 2025 05:02) (95% - 99%)    Parameters below as of 23 Mar 2025 05:02  Patient On (Oxygen Delivery Method): room air        Physical exam:  General:  Appearance is consistent with chronologic age.  No abnormal facies.  Gross skin survey within normal limits.    Cognitive/Language: The patient is oriented to person, place, time and date. Language with normal repetition, comprehension and naming. Mildly dysarthric  Eyes: VFF. EOMI w/o nystagmus or reported double vision. PERRL. No ptosis/weakness of eyelid closure.    Face: Facial sensation normal V1 - 3, no facial asymmetry.  Ears/Nose/Throat: Hearing grossly intact b/l. Palate elevates midline. Tongue and uvula midline.   Motor examination: Normal tone. No tremors or involuntary movements.  Strength Exam (MRC scale): 4+/5 RUE strength, 5/5 with rest of extremities.  Reflexes: 2+ b/l biceps and triceps reflexes, 1+ b/l brachioradialis, patella and Achilles reflexes. Plantar response downgoing b/l.  Sensory examination: Intact to light touch bilaterally, no extinction  Cerebellum: R FTN impaired, L FTN intact    Gait: Deferred    NIHSS: 2 (RUE dysmetria 1, dysarthria 1)    Neurologic:      LABS:                        9.0    4.77  )-----------( 159      ( 23 Mar 2025 06:50 )             26.6     03-23    140  |  104  |  32[H]  ----------------------------<  178[H]  4.3   |  24  |  1.8[H]    Ca    9.4      23 Mar 2025 06:50  Phos  3.7     03-23  Mg     2.6     03-23    TPro  5.6[L]  /  Alb  3.6  /  TBili  0.9  /  DBili  x   /  AST  26  /  ALT  15  /  AlkPhos  124[H]  03-23      Urinalysis Basic - ( 23 Mar 2025 06:50 )    Color: x / Appearance: x / SG: x / pH: x  Gluc: 178 mg/dL / Ketone: x  / Bili: x / Urobili: x   Blood: x / Protein: x / Nitrite: x   Leuk Esterase: x / RBC: x / WBC x   Sq Epi: x / Non Sq Epi: x / Bacteria: x        RADIOLOGY & ADDITIONAL TESTS:  CT PERFUSION 03/20:  No perfusion deficits to suggest areas of completed infarction or at risk   territory.    CTA HEAD/NECK 03/20:  Near complete stenosis of the left M1 MCA with normal flow seen distally.    Moderate focal stenosis of the A2 segment of the right DIVINE.    Other areas of atherosclerotic disease described above.    A callback was requested. Findings were discussed by Dr. Barron with SUJEY Wahl at 7:30 pm on 3/20/2025 with readback    MRI No Con 03/21:  1.  Acute left corona radiata/basal ganglia/p posterior limb/internal   capsule infarct    2.  Chronic right corona radiata infarct    3.  Moderate microvascular ischemic changes.    4.  Focal area left MCA correspond to severe stenosis.     Neurology Stroke Progress Note    INTERVAL HPI/OVERNIGHT EVENTS:  Patient seen and examined. No reported acute events overnight. Reports continued difficulty coordinating movement with her R arm and continues to have slurred speech, denies any new issues.    MEDICATIONS  (STANDING):  aspirin  chewable 81 milliGRAM(s) Oral daily  atorvastatin 80 milliGRAM(s) Oral at bedtime  buPROPion XL (24-Hour) . 150 milliGRAM(s) Oral daily  dextrose 5%. 1000 milliLiter(s) (50 mL/Hr) IV Continuous <Continuous>  dextrose 5%. 1000 milliLiter(s) (100 mL/Hr) IV Continuous <Continuous>  dextrose 50% Injectable 25 Gram(s) IV Push once  dextrose 50% Injectable 12.5 Gram(s) IV Push once  dextrose 50% Injectable 25 Gram(s) IV Push once  DULoxetine 60 milliGRAM(s) Oral daily  enoxaparin Injectable 40 milliGRAM(s) SubCutaneous every 24 hours  finasteride 5 milliGRAM(s) Oral at bedtime  gabapentin 100 milliGRAM(s) Oral three times a day  glucagon  Injectable 1 milliGRAM(s) IntraMuscular once  insulin glargine Injectable (LANTUS) 15 Unit(s) SubCutaneous every morning  insulin lispro (ADMELOG) corrective regimen sliding scale   SubCutaneous three times a day before meals  insulin lispro (ADMELOG) corrective regimen sliding scale   SubCutaneous at bedtime  levothyroxine 150 MICROGram(s) Oral daily  NIFEdipine XL 30 milliGRAM(s) Oral daily  ticagrelor 90 milliGRAM(s) Oral two times a day    MEDICATIONS  (PRN):  acetaminophen     Tablet .. 650 milliGRAM(s) Oral every 6 hours PRN Temp greater or equal to 38C (100.4F), Mild Pain (1 - 3)  dextrose Oral Gel 15 Gram(s) Oral once PRN Blood Glucose LESS THAN 70 milliGRAM(s)/deciliter    Allergies    clindamycin (Rash)    Intolerances      Vital Signs Last 24 Hrs  T(C): 36.2 (23 Mar 2025 05:02), Max: 36.6 (22 Mar 2025 20:00)  T(F): 97.1 (23 Mar 2025 05:02), Max: 97.9 (23 Mar 2025 00:00)  HR: 85 (23 Mar 2025 05:02) (76 - 92)  BP: 136/59 (23 Mar 2025 05:02) (131/52 - 169/69)  BP(mean): 85 (23 Mar 2025 05:02) (74 - 85)  RR: 18 (23 Mar 2025 05:02) (18 - 18)  SpO2: 95% (23 Mar 2025 05:02) (95% - 99%)    Parameters below as of 23 Mar 2025 05:02  Patient On (Oxygen Delivery Method): room air        Neuro Physical exam:  General:  Appearance is consistent with chronologic age.  No abnormal facies.  Gross skin survey within normal limits.    Cognitive/Language: The patient is oriented to person, place, time and date. Language with normal repetition, comprehension and naming. Mildly dysarthric  Eyes: VFF. EOMI w/o nystagmus or reported double vision. PERRL. No ptosis/weakness of eyelid closure.    Face: Facial sensation normal V1 - 3, no facial asymmetry.  Ears/Nose/Throat: Hearing grossly intact b/l. Palate elevates midline. Tongue and uvula midline.   Motor examination: Normal tone. No tremors or involuntary movements.  Strength Exam (MRC scale): 4+/5 RUE strength, 5/5 with rest of extremities.  Reflexes: 2+ b/l biceps and triceps reflexes, 1+ b/l brachioradialis, patella and Achilles reflexes. Plantar response downgoing b/l.  Sensory examination: Intact to light touch bilaterally, no extinction  Cerebellum: R FTN impaired, L FTN intact    Gait: Deferred    NIHSS: 2 (RUE dysmetria 1, dysarthria 1)    Neurologic:      LABS:                        9.0    4.77  )-----------( 159      ( 23 Mar 2025 06:50 )             26.6     03-23    140  |  104  |  32[H]  ----------------------------<  178[H]  4.3   |  24  |  1.8[H]    Ca    9.4      23 Mar 2025 06:50  Phos  3.7     03-23  Mg     2.6     03-23    TPro  5.6[L]  /  Alb  3.6  /  TBili  0.9  /  DBili  x   /  AST  26  /  ALT  15  /  AlkPhos  124[H]  03-23      Urinalysis Basic - ( 23 Mar 2025 06:50 )    Color: x / Appearance: x / SG: x / pH: x  Gluc: 178 mg/dL / Ketone: x  / Bili: x / Urobili: x   Blood: x / Protein: x / Nitrite: x   Leuk Esterase: x / RBC: x / WBC x   Sq Epi: x / Non Sq Epi: x / Bacteria: x        RADIOLOGY & ADDITIONAL TESTS:  CT PERFUSION 03/20:  No perfusion deficits to suggest areas of completed infarction or at risk   territory.    CTA HEAD/NECK 03/20:  Near complete stenosis of the left M1 MCA with normal flow seen distally.    Moderate focal stenosis of the A2 segment of the right DIVINE.    Other areas of atherosclerotic disease described above.    A callback was requested. Findings were discussed by Dr. Barron with SUJEY Wahl at 7:30 pm on 3/20/2025 with readback    MRI No Con 03/21:  1.  Acute left corona radiata/basal ganglia/p posterior limb/internal   capsule infarct    2.  Chronic right corona radiata infarct    3.  Moderate microvascular ischemic changes.    4.  Focal area left MCA correspond to severe stenosis.     Neurology Stroke Progress Note    INTERVAL HPI/OVERNIGHT EVENTS:  Patient seen and examined. No reported acute events overnight. Reports continued difficulty coordinating movement with her R arm and continues to have slurred speech, denies any new issues. Waiting for loop recorder placement before dispo.    MEDICATIONS  (STANDING):  aspirin  chewable 81 milliGRAM(s) Oral daily  atorvastatin 80 milliGRAM(s) Oral at bedtime  buPROPion XL (24-Hour) . 150 milliGRAM(s) Oral daily  dextrose 5%. 1000 milliLiter(s) (50 mL/Hr) IV Continuous <Continuous>  dextrose 5%. 1000 milliLiter(s) (100 mL/Hr) IV Continuous <Continuous>  dextrose 50% Injectable 25 Gram(s) IV Push once  dextrose 50% Injectable 12.5 Gram(s) IV Push once  dextrose 50% Injectable 25 Gram(s) IV Push once  DULoxetine 60 milliGRAM(s) Oral daily  enoxaparin Injectable 40 milliGRAM(s) SubCutaneous every 24 hours  finasteride 5 milliGRAM(s) Oral at bedtime  gabapentin 100 milliGRAM(s) Oral three times a day  glucagon  Injectable 1 milliGRAM(s) IntraMuscular once  insulin glargine Injectable (LANTUS) 15 Unit(s) SubCutaneous every morning  insulin lispro (ADMELOG) corrective regimen sliding scale   SubCutaneous three times a day before meals  insulin lispro (ADMELOG) corrective regimen sliding scale   SubCutaneous at bedtime  levothyroxine 150 MICROGram(s) Oral daily  NIFEdipine XL 30 milliGRAM(s) Oral daily  ticagrelor 90 milliGRAM(s) Oral two times a day    MEDICATIONS  (PRN):  acetaminophen     Tablet .. 650 milliGRAM(s) Oral every 6 hours PRN Temp greater or equal to 38C (100.4F), Mild Pain (1 - 3)  dextrose Oral Gel 15 Gram(s) Oral once PRN Blood Glucose LESS THAN 70 milliGRAM(s)/deciliter    Allergies    clindamycin (Rash)    Intolerances      Vital Signs Last 24 Hrs  T(C): 36.2 (23 Mar 2025 05:02), Max: 36.6 (22 Mar 2025 20:00)  T(F): 97.1 (23 Mar 2025 05:02), Max: 97.9 (23 Mar 2025 00:00)  HR: 85 (23 Mar 2025 05:02) (76 - 92)  BP: 136/59 (23 Mar 2025 05:02) (131/52 - 169/69)  BP(mean): 85 (23 Mar 2025 05:02) (74 - 85)  RR: 18 (23 Mar 2025 05:02) (18 - 18)  SpO2: 95% (23 Mar 2025 05:02) (95% - 99%)    Parameters below as of 23 Mar 2025 05:02  Patient On (Oxygen Delivery Method): room air        Neuro Physical exam:  General:  Appearance is consistent with chronologic age.  No abnormal facies.  Gross skin survey within normal limits.    Cognitive/Language: The patient is oriented to person, place, time and date. Language with normal repetition, comprehension and naming. Mildly dysarthric  Eyes: VFF. EOMI w/o nystagmus or reported double vision. PERRL. No ptosis/weakness of eyelid closure.    Face: Facial sensation normal V1 - 3, no facial asymmetry.  Ears/Nose/Throat: Hearing grossly intact b/l. Palate elevates midline. Tongue and uvula midline.   Motor examination: Normal tone. No tremors or involuntary movements.  Strength Exam (MRC scale): 4+/5 RUE strength, 5/5 with rest of extremities.  Reflexes: 2+ b/l biceps and triceps reflexes, 1+ b/l brachioradialis, patella and Achilles reflexes. Plantar response downgoing b/l.  Sensory examination: Intact to light touch bilaterally, no extinction  Cerebellum: R FTN impaired, L FTN intact    Gait: Deferred    NIHSS: 2 (RUE dysmetria 1, dysarthria 1)    Neurologic:      LABS:                        9.0    4.77  )-----------( 159      ( 23 Mar 2025 06:50 )             26.6     03-23    140  |  104  |  32[H]  ----------------------------<  178[H]  4.3   |  24  |  1.8[H]    Ca    9.4      23 Mar 2025 06:50  Phos  3.7     03-23  Mg     2.6     03-23    TPro  5.6[L]  /  Alb  3.6  /  TBili  0.9  /  DBili  x   /  AST  26  /  ALT  15  /  AlkPhos  124[H]  03-23      Urinalysis Basic - ( 23 Mar 2025 06:50 )    Color: x / Appearance: x / SG: x / pH: x  Gluc: 178 mg/dL / Ketone: x  / Bili: x / Urobili: x   Blood: x / Protein: x / Nitrite: x   Leuk Esterase: x / RBC: x / WBC x   Sq Epi: x / Non Sq Epi: x / Bacteria: x        RADIOLOGY & ADDITIONAL TESTS:  CT PERFUSION 03/20:  No perfusion deficits to suggest areas of completed infarction or at risk   territory.    CTA HEAD/NECK 03/20:  Near complete stenosis of the left M1 MCA with normal flow seen distally.    Moderate focal stenosis of the A2 segment of the right DIVINE.    Other areas of atherosclerotic disease described above.    A callback was requested. Findings were discussed by Dr. Barron with SUJEY Wahl at 7:30 pm on 3/20/2025 with readback    MRI No Con 03/21:  1.  Acute left corona radiata/basal ganglia/p posterior limb/internal   capsule infarct    2.  Chronic right corona radiata infarct    3.  Moderate microvascular ischemic changes.    4.  Focal area left MCA correspond to severe stenosis.

## 2025-03-23 NOTE — PROGRESS NOTE ADULT - ASSESSMENT
73yo woman PMH of aortic stenosis, hypertension, diabetes, CORTEZ, hypothyroidism, former smoker (quit 1996; still occasionally vapes medical marijuana) presenting with 1 day of R hand and leg weakness plus dysarthria, CTH with hypodensity in the right caudate head with small calcifications faintly evident on the last CT, CTA with near occlusion of left M1 MCA with moderate focal stenosis of the A2 segment of the right DIVINE, MRH confirms stroke subcortical L MCA territory suspect cardioembolic. Patient already taking plavix daily at baseline per primary care provider. PRU 3/21 234 suggesting plavix nonresponder.    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   -EP plan for loop recorder   -SBP goal: 120-160, allow permissive HTN.     #CKD stage 3-4  -Cr 1.9->1.8, s/p IV contrast, start on RL 75 cc for 24hrs  -recommend stopping IVF as pt is fluid overloaded    #fluid overload  -IVF stopped  -recommend chest xray     #Hypothyroid   Hypothyroid Synthroid 150mcg daily     #DM w/ neuropathy  -A1c 7.9  -holding home Humalog 75/25 twice daily   -3/22 start lantus 15u  -monitor for improved bs control     #sciatica  #diabetic neuropathy  gabapentin 400mg twice daily (home dose)   duloxetine 60mg daily     #Depression/Anxiety  Bupropion SR 150mg daily > interchange to bupropion XL 150mg daily     #CORTEZ  follow up pulm outpatient for better fitting CPAP  - supplemental O2 at night    Pending: improved BS, fluid status, oxygen requirement  from medical stand point could be discharged

## 2025-03-24 ENCOUNTER — TRANSCRIPTION ENCOUNTER (OUTPATIENT)
Age: 75
End: 2025-03-24

## 2025-03-24 LAB
ALBUMIN SERPL ELPH-MCNC: 3.8 G/DL — SIGNIFICANT CHANGE UP (ref 3.5–5.2)
ALP SERPL-CCNC: 146 U/L — HIGH (ref 30–115)
ALT FLD-CCNC: 17 U/L — SIGNIFICANT CHANGE UP (ref 0–41)
ANION GAP SERPL CALC-SCNC: 11 MMOL/L — SIGNIFICANT CHANGE UP (ref 7–14)
AST SERPL-CCNC: 28 U/L — SIGNIFICANT CHANGE UP (ref 0–41)
BASOPHILS # BLD AUTO: 0.01 K/UL — SIGNIFICANT CHANGE UP (ref 0–0.2)
BASOPHILS NFR BLD AUTO: 0.3 % — SIGNIFICANT CHANGE UP (ref 0–1)
BILIRUB SERPL-MCNC: 0.8 MG/DL — SIGNIFICANT CHANGE UP (ref 0.2–1.2)
BUN SERPL-MCNC: 45 MG/DL — HIGH (ref 10–20)
CALCIUM SERPL-MCNC: 9.5 MG/DL — SIGNIFICANT CHANGE UP (ref 8.4–10.5)
CHLORIDE SERPL-SCNC: 106 MMOL/L — SIGNIFICANT CHANGE UP (ref 98–110)
CO2 SERPL-SCNC: 23 MMOL/L — SIGNIFICANT CHANGE UP (ref 17–32)
CREAT SERPL-MCNC: 2 MG/DL — HIGH (ref 0.7–1.5)
EGFR: 25 ML/MIN/1.73M2 — LOW
EGFR: 25 ML/MIN/1.73M2 — LOW
EOSINOPHIL # BLD AUTO: 0.11 K/UL — SIGNIFICANT CHANGE UP (ref 0–0.7)
EOSINOPHIL NFR BLD AUTO: 2.9 % — SIGNIFICANT CHANGE UP (ref 0–8)
GLUCOSE BLDC GLUCOMTR-MCNC: 127 MG/DL — HIGH (ref 70–99)
GLUCOSE BLDC GLUCOMTR-MCNC: 153 MG/DL — HIGH (ref 70–99)
GLUCOSE BLDC GLUCOMTR-MCNC: 178 MG/DL — HIGH (ref 70–99)
GLUCOSE BLDC GLUCOMTR-MCNC: 214 MG/DL — HIGH (ref 70–99)
GLUCOSE SERPL-MCNC: 192 MG/DL — HIGH (ref 70–99)
HCT VFR BLD CALC: 26.2 % — LOW (ref 37–47)
HGB BLD-MCNC: 9 G/DL — LOW (ref 12–16)
IMM GRANULOCYTES NFR BLD AUTO: 0.3 % — SIGNIFICANT CHANGE UP (ref 0.1–0.3)
LYMPHOCYTES # BLD AUTO: 0.9 K/UL — LOW (ref 1.2–3.4)
LYMPHOCYTES # BLD AUTO: 23.4 % — SIGNIFICANT CHANGE UP (ref 20.5–51.1)
MAGNESIUM SERPL-MCNC: 1.8 MG/DL — SIGNIFICANT CHANGE UP (ref 1.8–2.4)
MCHC RBC-ENTMCNC: 34.4 G/DL — SIGNIFICANT CHANGE UP (ref 32–37)
MCHC RBC-ENTMCNC: 37.8 PG — HIGH (ref 27–31)
MCV RBC AUTO: 110.1 FL — HIGH (ref 81–99)
MONOCYTES # BLD AUTO: 0.57 K/UL — SIGNIFICANT CHANGE UP (ref 0.1–0.6)
MONOCYTES NFR BLD AUTO: 14.8 % — HIGH (ref 1.7–9.3)
NEUTROPHILS # BLD AUTO: 2.25 K/UL — SIGNIFICANT CHANGE UP (ref 1.4–6.5)
NEUTROPHILS NFR BLD AUTO: 58.3 % — SIGNIFICANT CHANGE UP (ref 42.2–75.2)
NRBC BLD AUTO-RTO: 0 /100 WBCS — SIGNIFICANT CHANGE UP (ref 0–0)
PHOSPHATE SERPL-MCNC: 4.3 MG/DL — SIGNIFICANT CHANGE UP (ref 2.1–4.9)
PLATELET # BLD AUTO: 178 K/UL — SIGNIFICANT CHANGE UP (ref 130–400)
PMV BLD: 9.4 FL — SIGNIFICANT CHANGE UP (ref 7.4–10.4)
POTASSIUM SERPL-MCNC: 4.4 MMOL/L — SIGNIFICANT CHANGE UP (ref 3.5–5)
POTASSIUM SERPL-SCNC: 4.4 MMOL/L — SIGNIFICANT CHANGE UP (ref 3.5–5)
PROT SERPL-MCNC: 5.9 G/DL — LOW (ref 6–8)
RBC # BLD: 2.38 M/UL — LOW (ref 4.2–5.4)
RBC # FLD: 13.7 % — SIGNIFICANT CHANGE UP (ref 11.5–14.5)
SODIUM SERPL-SCNC: 140 MMOL/L — SIGNIFICANT CHANGE UP (ref 135–146)
WBC # BLD: 3.85 K/UL — LOW (ref 4.8–10.8)
WBC # FLD AUTO: 3.85 K/UL — LOW (ref 4.8–10.8)

## 2025-03-24 PROCEDURE — 99233 SBSQ HOSP IP/OBS HIGH 50: CPT

## 2025-03-24 PROCEDURE — 99232 SBSQ HOSP IP/OBS MODERATE 35: CPT

## 2025-03-24 PROCEDURE — 33285 INSJ SUBQ CAR RHYTHM MNTR: CPT

## 2025-03-24 RX ORDER — INSULIN GLARGINE-YFGN 100 [IU]/ML
25 INJECTION, SOLUTION SUBCUTANEOUS EVERY MORNING
Refills: 0 | Status: DISCONTINUED | OUTPATIENT
Start: 2025-03-24 | End: 2025-03-28

## 2025-03-24 RX ORDER — FUROSEMIDE 10 MG/ML
10 INJECTION INTRAMUSCULAR; INTRAVENOUS ONCE
Refills: 0 | Status: DISCONTINUED | OUTPATIENT
Start: 2025-03-24 | End: 2025-03-24

## 2025-03-24 RX ORDER — TICAGRELOR 90 MG/1
1 TABLET ORAL
Qty: 60 | Refills: 0
Start: 2025-03-24 | End: 2025-04-22

## 2025-03-24 RX ORDER — ATORVASTATIN CALCIUM 80 MG/1
1 TABLET, FILM COATED ORAL
Qty: 30 | Refills: 0
Start: 2025-03-24 | End: 2025-04-22

## 2025-03-24 RX ORDER — INSULIN GLARGINE-YFGN 100 [IU]/ML
25 INJECTION, SOLUTION SUBCUTANEOUS
Refills: 0 | Status: DISCONTINUED | OUTPATIENT
Start: 2025-03-24 | End: 2025-03-24

## 2025-03-24 RX ORDER — ASPIRIN 325 MG
1 TABLET ORAL
Qty: 30 | Refills: 0
Start: 2025-03-24 | End: 2025-04-22

## 2025-03-24 RX ORDER — CEPHALEXIN 250 MG/1
500 CAPSULE ORAL ONCE
Refills: 0 | Status: COMPLETED | OUTPATIENT
Start: 2025-03-24 | End: 2025-03-24

## 2025-03-24 RX ADMIN — DULOXETINE 60 MILLIGRAM(S): 20 CAPSULE, DELAYED RELEASE ORAL at 14:42

## 2025-03-24 RX ADMIN — CEPHALEXIN 500 MILLIGRAM(S): 250 CAPSULE ORAL at 11:52

## 2025-03-24 RX ADMIN — Medication 81 MILLIGRAM(S): at 14:42

## 2025-03-24 RX ADMIN — GABAPENTIN 100 MILLIGRAM(S): 400 CAPSULE ORAL at 05:46

## 2025-03-24 RX ADMIN — TICAGRELOR 90 MILLIGRAM(S): 90 TABLET ORAL at 05:46

## 2025-03-24 RX ADMIN — INSULIN LISPRO 1: 100 INJECTION, SOLUTION INTRAVENOUS; SUBCUTANEOUS at 07:52

## 2025-03-24 RX ADMIN — GABAPENTIN 100 MILLIGRAM(S): 400 CAPSULE ORAL at 14:43

## 2025-03-24 RX ADMIN — ENOXAPARIN SODIUM 40 MILLIGRAM(S): 100 INJECTION SUBCUTANEOUS at 21:59

## 2025-03-24 RX ADMIN — INSULIN GLARGINE-YFGN 25 UNIT(S): 100 INJECTION, SOLUTION SUBCUTANEOUS at 08:29

## 2025-03-24 RX ADMIN — Medication 650 MILLIGRAM(S): at 08:36

## 2025-03-24 RX ADMIN — Medication 650 MILLIGRAM(S): at 09:04

## 2025-03-24 RX ADMIN — GABAPENTIN 100 MILLIGRAM(S): 400 CAPSULE ORAL at 21:59

## 2025-03-24 RX ADMIN — FINASTERIDE 5 MILLIGRAM(S): 1 TABLET, FILM COATED ORAL at 21:59

## 2025-03-24 RX ADMIN — TICAGRELOR 90 MILLIGRAM(S): 90 TABLET ORAL at 17:08

## 2025-03-24 RX ADMIN — INSULIN LISPRO 1: 100 INJECTION, SOLUTION INTRAVENOUS; SUBCUTANEOUS at 14:11

## 2025-03-24 RX ADMIN — Medication 150 MICROGRAM(S): at 05:46

## 2025-03-24 RX ADMIN — Medication 1 APPLICATION(S): at 14:47

## 2025-03-24 RX ADMIN — ATORVASTATIN CALCIUM 80 MILLIGRAM(S): 80 TABLET, FILM COATED ORAL at 21:59

## 2025-03-24 RX ADMIN — BUPROPION HYDROBROMIDE 150 MILLIGRAM(S): 522 TABLET, EXTENDED RELEASE ORAL at 14:42

## 2025-03-24 NOTE — PROGRESS NOTE ADULT - SUBJECTIVE AND OBJECTIVE BOX
Neurology Progress Note    Interval History:    Patient feels good, no new complaints. No overnight events.    Medications:  acetaminophen     Tablet .. 650 milliGRAM(s) Oral every 6 hours PRN  aspirin  chewable 81 milliGRAM(s) Oral daily  atorvastatin 80 milliGRAM(s) Oral at bedtime  buPROPion XL (24-Hour) . 150 milliGRAM(s) Oral daily  chlorhexidine 2% Cloths 1 Application(s) Topical <User Schedule>  dextrose 5%. 1000 milliLiter(s) IV Continuous <Continuous>  dextrose 5%. 1000 milliLiter(s) IV Continuous <Continuous>  dextrose 50% Injectable 25 Gram(s) IV Push once  dextrose 50% Injectable 25 Gram(s) IV Push once  dextrose 50% Injectable 12.5 Gram(s) IV Push once  dextrose Oral Gel 15 Gram(s) Oral once PRN  DULoxetine 60 milliGRAM(s) Oral daily  enoxaparin Injectable 40 milliGRAM(s) SubCutaneous every 24 hours  finasteride 5 milliGRAM(s) Oral at bedtime  gabapentin 100 milliGRAM(s) Oral three times a day  glucagon  Injectable 1 milliGRAM(s) IntraMuscular once  insulin glargine Injectable (LANTUS) 25 Unit(s) SubCutaneous every morning  insulin lispro (ADMELOG) corrective regimen sliding scale   SubCutaneous three times a day before meals  insulin lispro (ADMELOG) corrective regimen sliding scale   SubCutaneous at bedtime  levothyroxine 150 MICROGram(s) Oral daily  ticagrelor 90 milliGRAM(s) Oral two times a day      Vital Signs Last 24 Hrs  T(C): 36.4 (24 Mar 2025 13:30), Max: 36.8 (23 Mar 2025 20:40)  T(F): 97.6 (24 Mar 2025 13:30), Max: 98.3 (24 Mar 2025 05:02)  HR: 70 (24 Mar 2025 13:30) (69 - 84)  BP: 119/62 (24 Mar 2025 13:30) (119/56 - 138/56)  BP(mean): 81 (24 Mar 2025 13:30) (81 - 103)  RR: 18 (24 Mar 2025 13:30) (18 - 20)  SpO2: 98% (24 Mar 2025 13:30) (96% - 99%)    Parameters below as of 24 Mar 2025 13:30  Patient On (Oxygen Delivery Method): room air    Neurological Examination:  General:  Appearance is consistent with chronologic age.  No abnormal facies.  Gross skin survey within normal limits.    Cognitive/Language: The patient is oriented to person, place, time and date. Language with normal repetition, comprehension and naming. Mildly dysarthric  Eyes: VFF. EOMI w/o nystagmus or reported double vision. PERRL. No ptosis/weakness of eyelid closure.    Face: Facial sensation normal V1 - 3, no facial asymmetry.  Ears/Nose/Throat: Hearing grossly intact b/l. Palate elevates midline. Tongue and uvula midline.   Motor examination: Normal tone. No tremors or involuntary movements.  Strength Exam (MRC scale): 4+/5 RUE strength, 5/5 with rest of extremities.  Reflexes: 2+ b/l biceps and triceps reflexes, 1+ b/l brachioradialis, patella and Achilles reflexes. Plantar response downgoing b/l.  Sensory examination: Intact to light touch bilaterally, no extinction  Cerebellum: b/l FTN intact   NIHSS: 1 (dysarthria 1)    Labs:  CBC Full  -  ( 24 Mar 2025 06:22 )  WBC Count : 3.85 K/uL  RBC Count : 2.38 M/uL  Hemoglobin : 9.0 g/dL  Hematocrit : 26.2 %  Platelet Count - Automated : 178 K/uL  Mean Cell Volume : 110.1 fL  Mean Cell Hemoglobin : 37.8 pg  Mean Cell Hemoglobin Concentration : 34.4 g/dL  Auto Neutrophil # : x  Auto Lymphocyte # : x  Auto Monocyte # : x  Auto Eosinophil # : x  Auto Basophil # : x  Auto Neutrophil % : x  Auto Lymphocyte % : x  Auto Monocyte % : x  Auto Eosinophil % : x  Auto Basophil % : x    03-24    140  |  106  |  45[H]  ----------------------------<  192[H]  4.4   |  23  |  2.0[H]    Ca    9.5      24 Mar 2025 06:22  Phos  4.3     03-24  Mg     1.8     03-24    TPro  5.9[L]  /  Alb  3.8  /  TBili  0.8  /  DBili  x   /  AST  28  /  ALT  17  /  AlkPhos  146[H]  03-24    LIVER FUNCTIONS - ( 24 Mar 2025 06:22 )  Alb: 3.8 g/dL / Pro: 5.9 g/dL / ALK PHOS: 146 U/L / ALT: 17 U/L / AST: 28 U/L / GGT: x             Urinalysis Basic - ( 24 Mar 2025 06:22 )    Color: x / Appearance: x / SG: x / pH: x  Gluc: 192 mg/dL / Ketone: x  / Bili: x / Urobili: x   Blood: x / Protein: x / Nitrite: x   Leuk Esterase: x / RBC: x / WBC x   Sq Epi: x / Non Sq Epi: x / Bacteria: x

## 2025-03-24 NOTE — DISCHARGE NOTE NURSING/CASE MANAGEMENT/SOCIAL WORK - FINANCIAL ASSISTANCE
[facility] provides services at a reduced cost to those who are determined to be eligible through [facility]’s financial assistance program. Information regarding [facility]’s financial assistance program can be found by going to [email or web address] or by calling [phone].

## 2025-03-24 NOTE — DISCHARGE NOTE PROVIDER - NSDCFUSCHEDAPPT_GEN_ALL_CORE_FT
Venancio Lima  [facility] Physician [facility]  CARDIOLOGY [address] [place] [address]  Scheduled Appointment: 04/22/2025     [facility]  ELECTROPH [address]  Scheduled Appointment: 04/15/2025    Venancio Lima  [facility]  CARDIOLOGY [address] [place] [address] Av  Scheduled Appointment: 04/22/2025     [facility]  ELECTROPH [address]  Scheduled Appointment: 04/15/2025    Venancio Lima  [facility]  CARDIOLOGY [address]  Scheduled Appointment: 04/22/2025    Jared Eden  [facility]  NEUROSURG [address]  Scheduled Appointment: 04/30/2025    [facility]  NEUROLOGY [address]  Scheduled Appointment: 05/01/2025

## 2025-03-24 NOTE — DISCHARGE NOTE PROVIDER - NSDCMRMEDTOKEN_GEN_ALL_CORE_FT
Budeprion  mg/12 hours oral tablet, extended release: 1 orally  clopidogrel 75 mg oral tablet: 1 orally  Cymbalta 60 mg oral delayed release capsule: 1 orally  finasteride 5 mg oral tablet: 1 orally  gabapentin 400 mg oral tablet: 1 tab(s) orally 2 times a day  HumaLOG Mix 75/25 Pen subcutaneous suspension: subcutaneous 2 times a day  levothyroxine 150 mcg (0.15 mg) oral tablet: 1 orally  lisinopril-hydrochlorothiazide 20 mg-25 mg oral tablet: 1 tab(s) orally 2 times a day  rosuvastatin 20 mg oral capsule: 1 orally   aspirin 81 mg oral tablet, chewable: 1 tab(s) orally once a day  atorvastatin 80 mg oral tablet: 1 tab(s) orally once a day (at bedtime)  Budeprion  mg/12 hours oral tablet, extended release: 1 tab(s) orally once a day  Cymbalta 60 mg oral delayed release capsule: 1 cap(s) orally once a day  finasteride 5 mg oral tablet: 1 tablet orally once a day  gabapentin 400 mg oral tablet: 1 tab(s) orally 2 times a day  HumaLOG Mix 75/25 Pen subcutaneous suspension: subcutaneous 2 times a day  levothyroxine 150 mcg (0.15 mg) oral tablet: 1 tablet orally once a day  lisinopril-hydrochlorothiazide 20 mg-25 mg oral tablet: 1 tab(s) orally 2 times a day  ticagrelor 90 mg oral tablet: 1 tab(s) orally 2 times a day   aspirin 81 mg oral tablet, chewable: 1 tab(s) orally once a day  Budeprion  mg/12 hours oral tablet, extended release: 1 tab(s) orally once a day  Cymbalta 60 mg oral delayed release capsule: 1 cap(s) orally once a day  finasteride 5 mg oral tablet: 1 tablet orally once a day  gabapentin 400 mg oral tablet: 1 tab(s) orally 2 times a day  HumaLOG Mix 75/25 Pen subcutaneous suspension: subcutaneous 2 times a day  levothyroxine 150 mcg (0.15 mg) oral tablet: 1 tablet orally once a day  lisinopril-hydrochlorothiazide 20 mg-25 mg oral tablet: 1 tab(s) orally 2 times a day  pantoprazole 40 mg oral delayed release tablet: 1 tab(s) orally once a day (before a meal)  rosuvastatin 40 mg oral capsule: 1 cap(s) orally once a day  ticagrelor 90 mg oral tablet: 1 tab(s) orally 2 times a day   aspirin 81 mg oral tablet, chewable: 1 tab(s) orally once a day  Budeprion  mg/12 hours oral tablet, extended release: 1 tab(s) orally once a day  Cymbalta 60 mg oral delayed release capsule: 1 cap(s) orally once a day  finasteride 5 mg oral tablet: 1 tablet orally once a day  gabapentin 400 mg oral tablet: 1 tab(s) orally 2 times a day  HumaLOG Mix 75/25 Pen subcutaneous suspension: subcutaneous 2 times a day  levothyroxine 150 mcg (0.15 mg) oral tablet: 1 tablet orally once a day  lisinopril-hydrochlorothiazide 20 mg-25 mg oral tablet: 1 tab(s) orally 2 times a day  pantoprazole 40 mg oral delayed release tablet: 1 tab(s) orally once a day (before a meal)  rosuvastatin 40 mg oral tablet: 1 tab(s) orally once a day  ticagrelor 90 mg oral tablet: 1 tab(s) orally 2 times a day

## 2025-03-24 NOTE — PROGRESS NOTE ADULT - SUBJECTIVE AND OBJECTIVE BOX
Electrophysiology Brief Post-Op Note    I have personally seen and examined the patient.  I agree with the history and physical which I have reviewed and noted any changes below.  03-24-25 @ 12:30    PRE-OP DIAGNOSIS: Cryptogenic CVA    POST-OP DIAGNOSIS: Cryptogenic CVA    PROCEDURE: Loop Implant    Physician Assistant: PONCE RM  Physician: Dr Cruz / Dr Arcos  Referring: Dr Khan     ESTIMATED BLOOD LOSS:  2    mL    ANESTHESIA TYPE:  [  ]General Anesthesia  [  ] Sedation  [X  ] Local/Regional    CONDITION  [  ] Critical  [  ] Serious  [  ]Fair  [ X ]Good    SPECIMENS REMOVED (IF APPLICABLE):  none    IMPLANTS (IF APPLICABLE)  Loop Recorder ([employer])  [id number]      FINDINGS  PLAN OF CARE  - May remove bandaid in 2 days  - May shower in 2 days  Follow up in EP office ____in 1 month for wound check

## 2025-03-24 NOTE — DISCHARGE NOTE PROVIDER - PROVIDER TOKENS
PROVIDER:[TOKEN:[[id number]],FOLLOWUP:[2 weeks]] PROVIDER:[TOKEN:[[id number]],FOLLOWUP:[2 weeks]],PROVIDER:[TOKEN:[[id number]],FOLLOWUP:[1 month]],PROVIDER:[TOKEN:[[id number]],FOLLOWUP:[1 month]]

## 2025-03-24 NOTE — DISCHARGE NOTE NURSING/CASE MANAGEMENT/SOCIAL WORK - PATIENT PORTAL LINK FT
You can access the FollowMyHealth Patient Portal offered by [facility] by registering at the following website: [email or web address]. By joining [facility]’s FollowMyHealth portal, you will also be able to view your health information using other applications (apps) compatible with our system.

## 2025-03-24 NOTE — PROGRESS NOTE ADULT - ASSESSMENT
75yo woman PMH of aortic stenosis, hypertension, diabetes, CORTEZ, hypothyroidism, former smoker (quit 1996; still occasionally vapes medical marijuana) presenting with 1 day of R hand and leg weakness plus dysarthria, CTH with hypodensity in the right caudate head with small calcifications faintly evident on the last CT, CTA with near occlusion of left M1 MCA with moderate focal stenosis of the A2 segment of the right DIVINE, MRH confirms stroke subcortical L MCA territory suspect cardioembolic. Patient already taking plavix daily at baseline per primary care provider. PRU 3/21 234 suggesting plavix nonresponder.    #subcortical L MCA territory suspect cardioembolic  #HTN  - s/p DAPT load  - antiplatelets/anticoagulants: aspirin 81mg daily , clopidogrel 75mg daily > switch to brilinta 90mg twice daily   - antilipemics: atorvastatin 80mg nightly inpatient > discharge on rosuvastatin 40mg nightly   - SBP goal: 120-160  - q4h neuro and vitals checks  - holding home lisinopril- hydrochlorothiazide 20/25 mg twice daily due to CKD   - c/w nifedipine 30 mg daily  - s/p loop recorder placement 3/24    #CKD baseline 2.3-3.4  - Cr improved from previous  - 75c LR 24 hours, stopped as per hospitalist recs    #CORTEZ  - follow up pulm outpatient for better fitting CPAP  - supplemental O2 at night    #Hypothyroid  - c/w home Synthroid 150mcg daily     #DM w/ neuropathy  - A1c 7.9  - holding home Humalog 75/25 ~45U twice daily   - hospitalist advising converting to insulin NPH 30U twice daily > will start on 15U twice daily and increase as tolerated/required  - insulin corrective sliding scale     #sciatica  #diabetic neuropathy  - gabapentin 400mg twice daily (home dose) > converted to 100mg three times daily per [facility]  - duloxetine 60mg daily     #hair loss  - finasteride 5mg daily 5mg nightly (home dose)     #Depression/Anxiety  - Bupropion SR 150mg daily > interchange to bupropion XL 150mg daily     --------------------------------------------------  #prophylactic measures  - DVT prophylaxis: lovenox / SCDs  - GI prophylaxis: not indicated at this time  - Diet: DASH/TLC / consistent carbohydrate    #Dispo: [facility] pending approval

## 2025-03-24 NOTE — DISCHARGE NOTE PROVIDER - HOSPITAL COURSE
Hospital course:  73yo woman w PMHx of aortic stenosis, hypertension, diabetes, CORTEZ, hypothyroidism, former smoker (quit 1996; still occasionally vapes medical marijuana) presenting with 1 day of R hand and leg weakness plus dysarthria, CTH with hypodensity in the right caudate head with small calcifications faintly evident on the last CT, CTA with near occlusion of left M1 MCA with moderate focal stenosis of the A2 segment of the right DIVINE, MRH confirms stroke subcortical L MCA territory suspect cardioembolic. Patient already taking plavix daily at baseline per primary care provider. PRU 3/21 234 suggesting plavix nonresponder. Plavix was switched to Brilinta.    During this hospital course, patient had an ischemic stroke located in left corona radiata/basal ganglia/posterior limb/internal capsule as seen on MRI.   The stroke etiology is likely secondary to: cardioembolic    Patient had the following workup done in house:  CT Head: There is a hypodensity in the right caudate head with small calcifications. This lesion was faintly evident on the last CT, and is more prominent now and more calcified. No intracranial hemorrhage, or midline shift.  MR Head Non Contrast: Acute left corona radiata/basal ganglia/posterior limb/internal capsule infarct. Chronic right corona radiata infarct. Moderate microvascular ischemic changes. Focal area left MCA correspond to severe stenosis.  CT PERFUSION: No perfusion deficits to suggest areas of completed infarction or at risk territory.  CTA HEAD/NECK: Near complete stenosis of the left M1 MCA with normal flow seen distally. Moderate focal stenosis of the A2 segment of the right DIVINE.  TTE: EF of 53%. Grade I diastolic dysfunction. Normal right ventricular size and function.    Physical exam at discharge:  General:  Appearance is consistent with chronologic age.  No abnormal facies.  Gross skin survey within normal limits.    Cognitive/Language: The patient is oriented to person, place, time and date. Language with normal repetition, comprehension and naming. Mildly dysarthric  Eyes: VFF. EOMI w/o nystagmus or reported double vision. PERRL. No ptosis/weakness of eyelid closure.    Face: Facial sensation normal V1 - 3, no facial asymmetry.  Ears/Nose/Throat: Hearing grossly intact b/l. Palate elevates midline. Tongue and uvula midline.   Motor examination: Normal tone. No tremors or involuntary movements.  Strength Exam (MRC scale): 4+/5 RUE strength, 5/5 with rest of extremities.  Reflexes: 2+ b/l biceps and triceps reflexes, 1+ b/l brachioradialis, patella and Achilles reflexes. Plantar response downgoing b/l.  Sensory examination: Intact to light touch bilaterally, no extinction  Cerebellum: b/l FTN intact   NIHSS: 1 (dysarthria 1)    New medications on discharge:  Aspirin 81mg QD  Brilinta 90mg BID  Atorvastatin 80mg QHS    Follow up with Neurovascular outpatient in 2 weeks  Follow up with EP in 1 month Hospital course:  73yo woman w PMHx of aortic stenosis, hypertension, diabetes, CORTEZ, hypothyroidism, former smoker (quit 1996; still occasionally vapes medical marijuana) presenting with 1 day of R hand and leg weakness plus dysarthria, CTH with hypodensity in the right caudate head with small calcifications faintly evident on the last CT, CTA with near occlusion of left M1 MCA with moderate focal stenosis of the A2 segment of the right DIVINE, MRH confirms stroke subcortical L MCA territory suspect cardioembolic. Patient already taking plavix daily at baseline per primary care provider. PRU 3/21 234 suggesting plavix nonresponder. Plavix was switched to Brilinta. s/p ILR placement 3/24.    During this hospital course, patient had an ischemic stroke located in left corona radiata/basal ganglia/posterior limb/internal capsule as seen on MRI.   The stroke etiology is likely secondary to: cardioembolic    Patient had the following workup done in house:  CT Head: There is a hypodensity in the right caudate head with small calcifications. This lesion was faintly evident on the last CT, and is more prominent now and more calcified. No intracranial hemorrhage, or midline shift.  MR Head Non Contrast: Acute left corona radiata/basal ganglia/posterior limb/internal capsule infarct. Chronic right corona radiata infarct. Moderate microvascular ischemic changes. Focal area left MCA correspond to severe stenosis.  CT PERFUSION: No perfusion deficits to suggest areas of completed infarction or at risk territory.  CTA HEAD/NECK: Near complete stenosis of the left M1 MCA with normal flow seen distally. Moderate focal stenosis of the A2 segment of the right DIVINE.  TTE: EF of 53%. Grade I diastolic dysfunction. Normal right ventricular size and function.    Physical exam at discharge:  General:  Appearance is consistent with chronologic age.  No abnormal facies.  Gross skin survey within normal limits.    Cognitive/Language: The patient is oriented to person, place, time and date. Language with normal repetition, comprehension and naming. Mildly dysarthric  Eyes: VFF. EOMI w/o nystagmus or reported double vision. PERRL. No ptosis/weakness of eyelid closure.    Face: Facial sensation normal V1 - 3, no facial asymmetry.  Ears/Nose/Throat: Hearing grossly intact b/l. Palate elevates midline. Tongue and uvula midline.   Motor examination: Normal tone. No tremors or involuntary movements.  Strength Exam (MRC scale): 4+/5 RUE strength, 5/5 with rest of extremities.  Reflexes: 2+ b/l biceps and triceps reflexes, 1+ b/l brachioradialis, patella and Achilles reflexes. Plantar response downgoing b/l.  Sensory examination: Intact to light touch bilaterally, no extinction  Cerebellum: b/l FTN intact   NIHSS: 1 (dysarthria 1)    New medications on discharge:  Aspirin 81mg QD  Brilinta 90mg BID  Atorvastatin 80mg QHS    Follow up with Neurovascular outpatient in 2 weeks  Follow up with EP in 1 month Hospital course:  73yo woman w PMHx of aortic stenosis, hypertension, diabetes, CORTEZ, hypothyroidism, former smoker (quit 1996; still occasionally vapes medical marijuana) presenting with 1 day of R hand and leg weakness plus dysarthria, CTH with hypodensity in the right caudate head with small calcifications faintly evident on the last CT, CTA with near occlusion of left M1 MCA with moderate focal stenosis of the A2 segment of the right DIVINE, MRH confirms stroke subcortical L MCA territory suspect cardioembolic. Patient already taking plavix daily at baseline per primary care provider. PRU 3/21 234 suggesting plavix nonresponder. Plavix was switched to Brilinta. s/p ILR placement 3/24.    During this hospital course, patient had an ischemic stroke located in left corona radiata/basal ganglia/posterior limb/internal capsule as seen on MRI.   The stroke etiology is likely secondary to: cardioembolic    Patient had the following workup done in house:  CT Head: There is a hypodensity in the right caudate head with small calcifications. This lesion was faintly evident on the last CT, and is more prominent now and more calcified. No intracranial hemorrhage, or midline shift.  MR Head Non Contrast: Acute left corona radiata/basal ganglia/posterior limb/internal capsule infarct. Chronic right corona radiata infarct. Moderate microvascular ischemic changes. Focal area left MCA correspond to severe stenosis.  CT PERFUSION: No perfusion deficits to suggest areas of completed infarction or at risk territory.  CTA HEAD/NECK: Near complete stenosis of the left M1 MCA with normal flow seen distally. Moderate focal stenosis of the A2 segment of the right DIVINE.  TTE: EF of 53%. Grade I diastolic dysfunction. Normal right ventricular size and function.    Physical exam at discharge:  General:  Appearance is consistent with chronologic age.  No abnormal facies.  Gross skin survey within normal limits.    Cognitive/Language: The patient is oriented to person, place, time and date. Language with normal repetition, comprehension and naming. Mildly dysarthric  Eyes: VFF. EOMI w/o nystagmus or reported double vision. PERRL. No ptosis/weakness of eyelid closure.    Face: Facial sensation normal V1 - 3, no facial asymmetry.  Ears/Nose/Throat: Hearing grossly intact b/l. Palate elevates midline. Tongue and uvula midline.   Motor examination: Normal tone. No tremors or involuntary movements.  Strength Exam (MRC scale): 4+/5 RUE strength, 5/5 with rest of extremities.  Reflexes: 2+ b/l biceps and triceps reflexes, 1+ b/l brachioradialis, patella and Achilles reflexes. Plantar response downgoing b/l.  Sensory examination: Intact to light touch bilaterally, no extinction  Cerebellum: b/l FTN intact   NIHSS: 1 (dysarthria 1)    New medications on discharge:  Aspirin 81mg QD  Brilinta 90mg BID  Atorvastatin 80mg QHS    Follow up with Neurovascular outpatient in 2 weeks  Follow up with EP in 1 month    Attending Attestation: Patient seen and examined and agree with above except as noted.  Patients history, notes, labs, imaging, vitals and meds reviewed personally. Plan and follow up as above Hospital course:  75yo woman w PMHx of aortic stenosis, hypertension, diabetes, CORTEZ, hypothyroidism, former smoker (quit 1996; still occasionally vapes medical marijuana) presenting with 1 day of R hand and leg weakness plus dysarthria, CTH with hypodensity in the right caudate head with small calcifications faintly evident on the last CT, CTA with near occlusion of left M1 MCA with moderate focal stenosis of the A2 segment of the right DIVINE, MRH confirms stroke subcortical L MCA territory suspect cardioembolic. Patient already taking plavix daily at baseline per primary care provider. PRU 3/21 234 suggesting plavix nonresponder. Plavix was switched to Brilinta. s/p ILR placement 3/24.    During this hospital course, patient had an ischemic stroke located in left corona radiata/basal ganglia/posterior limb/internal capsule as seen on MRI.   The stroke etiology is likely secondary to: cardioembolic    Patient had the following workup done in house:  CT Head: There is a hypodensity in the right caudate head with small calcifications. This lesion was faintly evident on the last CT, and is more prominent now and more calcified. No intracranial hemorrhage, or midline shift.  MR Head Non Contrast: Acute left corona radiata/basal ganglia/posterior limb/internal capsule infarct. Chronic right corona radiata infarct. Moderate microvascular ischemic changes. Focal area left MCA correspond to severe stenosis.  CT PERFUSION: No perfusion deficits to suggest areas of completed infarction or at risk territory.  CTA HEAD/NECK: Near complete stenosis of the left M1 MCA with normal flow seen distally. Moderate focal stenosis of the A2 segment of the right DIVINE.  TTE: EF of 53%. Grade I diastolic dysfunction. Normal right ventricular size and function.    Physical exam at discharge:  General:  Appearance is consistent with chronologic age.  No abnormal facies.  Gross skin survey within normal limits.    Cognitive/Language: The patient is oriented to person, place, time and date. Language with normal repetition, comprehension and naming. Nondysarthria  Eyes: VFF. EOMI w/o nystagmus or reported double vision. PERRL. No ptosis/weakness of eyelid closure.    Face: Facial sensation normal V1 - 3, no facial asymmetry.  Ears/Nose/Throat: Hearing grossly intact b/l. Palate elevates midline. Tongue and uvula midline.   Motor examination: Normal tone. No tremors or involuntary movements.  Strength Exam (MRC scale): 4+/5 RUE strength, 5/5 with rest of extremities.  Reflexes: 2+ b/l biceps and triceps reflexes, 1+ b/l brachioradialis, patella and Achilles reflexes. Plantar response downgoing b/l.  Sensory examination: Intact to light touch bilaterally, no extinction  Cerebellum: b/l FTN intact   NIHSS 0    New medications on discharge:  Aspirin 81mg QD  Brilinta 90mg BID  Atorvastatin 80mg QHS    Follow up with Neurovascular outpatient in 2 weeks  Follow up with Neuroendovascular outpatient in 1 month  Follow up with EP in 1 month    Attending Attestation: Patient seen and examined and agree with above except as noted.  Patients history, notes, labs, imaging, vitals and meds reviewed personally. Plan and follow up as above Hospital course:  73yo woman w PMHx of aortic stenosis, hypertension, diabetes, CORTEZ, hypothyroidism, former smoker (quit 1996; still occasionally vapes medical marijuana) presenting with 1 day of R hand and leg weakness plus dysarthria, CTH with hypodensity in the right caudate head with small calcifications faintly evident on the last CT, CTA with near occlusion of left M1 MCA with moderate focal stenosis of the A2 segment of the right DIVINE, MRH confirms stroke subcortical L MCA territory suspect cardioembolic. Patient already taking plavix daily at baseline per primary care provider. PRU 3/21 234 suggesting plavix nonresponder. Plavix was switched to Brilinta. s/p ILR placement 3/24.    During this hospital course, patient had an ischemic stroke located in left corona radiata/basal ganglia/posterior limb/internal capsule as seen on MRI.   The stroke etiology is likely secondary to: cardioembolic    Patient had the following workup done in house:  CT Head: There is a hypodensity in the right caudate head with small calcifications. This lesion was faintly evident on the last CT, and is more prominent now and more calcified. No intracranial hemorrhage, or midline shift.  MR Head Non Contrast: Acute left corona radiata/basal ganglia/posterior limb/internal capsule infarct. Chronic right corona radiata infarct. Moderate microvascular ischemic changes. Focal area left MCA correspond to severe stenosis.  CT PERFUSION: No perfusion deficits to suggest areas of completed infarction or at risk territory.  CTA HEAD/NECK: Near complete stenosis of the left M1 MCA with normal flow seen distally. Moderate focal stenosis of the A2 segment of the right DIVINE.  TTE: EF of 53%. Grade I diastolic dysfunction. Normal right ventricular size and function.    Physical exam at discharge:  General:  Appearance is consistent with chronologic age.  No abnormal facies.  Gross skin survey within normal limits.    Cognitive/Language: The patient is oriented to person, place, time and date. Language with normal repetition, comprehension and naming. Nondysarthria  Eyes: VFF. EOMI w/o nystagmus or reported double vision. PERRL. No ptosis/weakness of eyelid closure.    Face: Facial sensation normal V1 - 3, no facial asymmetry.  Ears/Nose/Throat: Hearing grossly intact b/l. Palate elevates midline. Tongue and uvula midline.   Motor examination: Normal tone. No tremors or involuntary movements.  Strength Exam (MRC scale): 4+/5 RUE strength, 5/5 with rest of extremities.  Reflexes: 2+ b/l biceps and triceps reflexes, 1+ b/l brachioradialis, patella and Achilles reflexes. Plantar response downgoing b/l.  Sensory examination: Intact to light touch bilaterally, no extinction  Cerebellum: b/l FTN intact   NIHSS 0    New medications on discharge:  Aspirin 81mg QD  Brilinta 90mg BID  Rosuvastatin 40mg QHS    Follow up with Neurovascular outpatient in 2 weeks  Follow up with Neuroendovascular outpatient in 1 month  Follow up with EP in 1 month    Attending Attestation: Patient seen and examined and agree with above except as noted.  Patients history, notes, labs, imaging, vitals and meds reviewed personally. Plan and follow up as above Hospital course:  73yo woman w PMHx of aortic stenosis, hypertension, diabetes, CORETZ, hypothyroidism, former smoker (quit 1996; still occasionally vapes medical marijuana) presenting with 1 day of R hand and leg weakness plus dysarthria, CTH with hypodensity in the right caudate head with small calcifications faintly evident on the last CT, CTA with near occlusion of left M1 MCA with moderate focal stenosis of the A2 segment of the right DIVINE, MRH confirms stroke subcortical L MCA territory suspect cardioembolic. Patient already taking plavix daily at baseline per primary care provider. PRU 3/21 234 suggesting plavix nonresponder. Plavix was switched to Brilinta. s/p ILR placement 3/24.    During this hospital course, patient had an ischemic stroke located in left corona radiata/basal ganglia/posterior limb/internal capsule as seen on MRI.   The stroke etiology is likely secondary to: cardioembolic    Patient had the following workup done in house:  CT Head: There is a hypodensity in the right caudate head with small calcifications. This lesion was faintly evident on the last CT, and is more prominent now and more calcified. No intracranial hemorrhage, or midline shift.  MR Head Non Contrast: Acute left corona radiata/basal ganglia/posterior limb/internal capsule infarct. Chronic right corona radiata infarct. Moderate microvascular ischemic changes. Focal area left MCA correspond to severe stenosis.  CT PERFUSION: No perfusion deficits to suggest areas of completed infarction or at risk territory.  CTA HEAD/NECK: Near complete stenosis of the left M1 MCA with normal flow seen distally. Moderate focal stenosis of the A2 segment of the right DIVINE.  TTE: EF of 53%. Grade I diastolic dysfunction. Normal right ventricular size and function.    Physical exam at discharge:  General:  Appearance is consistent with chronologic age.  No abnormal facies.  Gross skin survey within normal limits.    Cognitive/Language: The patient is oriented to person, place, time and date. Language with normal repetition, comprehension and naming. Nondysarthria  Eyes: VFF. EOMI w/o nystagmus or reported double vision. PERRL. No ptosis/weakness of eyelid closure.    Face: Facial sensation normal V1 - 3, no facial asymmetry.  Ears/Nose/Throat: Hearing grossly intact b/l. Palate elevates midline. Tongue and uvula midline.   Motor examination: Normal tone. No tremors or involuntary movements.  Strength Exam (MRC scale): 4+/5 RUE strength, 5/5 with rest of extremities.  Reflexes: 2+ b/l biceps and triceps reflexes, 1+ b/l brachioradialis, patella and Achilles reflexes. Plantar response downgoing b/l.  Sensory examination: Intact to light touch bilaterally, no extinction  Cerebellum: b/l FTN intact   NIHSS 0    New medications on discharge:  Aspirin 81mg QD  Brilinta 90mg BID  Rosuvastatin 40mg QHS    Follow up with Neurovascular outpatient in 2 weeks  Follow up with Neuroendovascular outpatient in 1 month  Follow up with EP in 1 month    Attending Attestation: Patient seen and examined and agree with above except as noted.  Patients history, notes, labs, imaging, vitals and meds reviewed personally. Plan and follow up as above      Stroke attending attestation:  Pt is a 75 yo F with PMHx of HTN, HLD, DM, CORTEZ (noncompliant with CPAP), remote smoker, aortic stenosis, who presented with right hand weakness and dysarthria. Improved today, NIHSS 2. CT head without acute process. CTA head/neck with focal critical left M1 stenosis,    Impr: left lentiform nucleus/corona radiata small scattered strokes  Given overall appearance of L MCA, favor underlying embolus rather than atherosclerotic plaque  PTA: ASA/plavix. . D/c'ed plavix and started brilinta 90mg BID  Discussed importance of CORTEZ management  S/p ILR placement  F/u outpt for MR PATRICIA  D/c to [facility] f/u in stroke clinic .

## 2025-03-24 NOTE — PROGRESS NOTE ADULT - ASSESSMENT
75yo woman PMH of aortic stenosis, hypertension, diabetes, CORTEZ, hypothyroidism, former smoker (quit 1996; still occasionally vapes medical marijuana) presenting with 1 day of R hand and leg weakness plus dysarthria, CTH with hypodensity in the right caudate head with small calcifications faintly evident on the last CT, CTA with near occlusion of left M1 MCA with moderate focal stenosis of the A2 segment of the right DIVINE, MRH confirms stroke subcortical L MCA territory suspect cardioembolic. Patient already taking plavix daily at baseline per primary care provider. PRU 3/21 234 suggesting plavix nonresponder.    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   -EP loop placed   -SBP goal: 120-160, allow permissive HTN.     #CKD stage 3-4  -creatinine 2.0    #fluid overload  -IVF stopped  -pt doing well on ra     #Hypothyroid   Hypothyroid Synthroid 150mcg daily     #DM w/ neuropathy  -A1c 7.9  -holding home Humalog 75/25 twice daily   -3/22 start lantus 15u  -monitor for improved bs control     #sciatica  #diabetic neuropathy  gabapentin 400mg twice daily (home dose)   duloxetine 60mg daily     #Depression/Anxiety  Bupropion SR 150mg daily > interchange to bupropion XL 150mg daily     #CORTEZ  follow up pulm outpatient for better fitting CPAP  - supplemental O2 at night    Pending: improved BS, fluid status, creatinine monitor trend

## 2025-03-24 NOTE — DISCHARGE NOTE PROVIDER - NSDCCPCAREPLAN_GEN_ALL_CORE_FT
PRINCIPAL DISCHARGE DIAGNOSIS  Diagnosis: Ischemic stroke  Assessment and Plan of Treatment: You have been diagnosed with stroke, which is a condition that develops when part of the brain doesn't get enough blood and oxygen.  After diagnosis of a stroke, it is very important to manage your risk factors and prevent future strokes from occurring. On discharge you should do the following:  - Continue aspirin and Brilinta, blood thinners that improve blood flow to the brain.  - Contiue Atorvastatin 80mg, a cholesterol medication that also helps to stabilize your blood vessels.  - Follow up in Stroke Clinic in 2 weeks.  - You had a Loop Recorder placed to evaluate for abnormal heart rhythms that increase risk of stroke. You will need to follow up with a cardiac electrophysiologist in 1 month to follow up with this device.   - Follow with a primary care physician for management of high blood pressure, high cholesterol, and high blood sugar as these risk factors increase stroke risk. Take medications for these conditions as indicated in your discharge medication list.   - Avoiding smoking, and second hand smoke.   Report to the emergency department if you have sudden onset severe headache, blurry/ double vision or vision loss, vertigo, numbness or weakness, slurred speech or difficulty walking as these can be signs of stroke reoccurence.

## 2025-03-24 NOTE — DISCHARGE NOTE PROVIDER - CARE PROVIDER_API CALL
Maritza Poe  Neurology  [address] [place] [id number]  Phone: [phone]  Fax: [phone]  Follow Up Time: 2 weeks   Maritza Poe  Neurology  [address] [place] [id number]  Phone: [phone]  Fax: [phone]  Follow Up Time: 2 weeks    Jared Eden  Neurosurgery  [address] [place] [address], [address]  [place] [address]  Phone: [phone]  Fax: [phone]  Follow Up Time: 1 month    Kyra Arcos  Cardiovascular Disease  [address] [place] [id number]  Phone: [phone]  Fax: [phone]  Follow Up Time: 1 month

## 2025-03-24 NOTE — DISCHARGE NOTE PROVIDER - CARE PROVIDERS DIRECT ADDRESSES
,[email or web address]

## 2025-03-24 NOTE — PROGRESS NOTE ADULT - SUBJECTIVE AND OBJECTIVE BOX
Patient is a 74y old  Female who presents with a chief complaint of stroke (24 Mar 2025 12:29)      Patient seen and examined at bedside.    ALLERGIES:  clindamycin (Rash)    MEDICATIONS:  acetaminophen     Tablet .. 650 milliGRAM(s) Oral every 6 hours PRN  aspirin  chewable 81 milliGRAM(s) Oral daily  atorvastatin 80 milliGRAM(s) Oral at bedtime  buPROPion XL (24-Hour) . 150 milliGRAM(s) Oral daily  chlorhexidine 2% Cloths 1 Application(s) Topical <User Schedule>  dextrose 5%. 1000 milliLiter(s) IV Continuous <Continuous>  dextrose 5%. 1000 milliLiter(s) IV Continuous <Continuous>  dextrose 50% Injectable 25 Gram(s) IV Push once  dextrose 50% Injectable 25 Gram(s) IV Push once  dextrose 50% Injectable 12.5 Gram(s) IV Push once  dextrose Oral Gel 15 Gram(s) Oral once PRN  DULoxetine 60 milliGRAM(s) Oral daily  enoxaparin Injectable 40 milliGRAM(s) SubCutaneous every 24 hours  finasteride 5 milliGRAM(s) Oral at bedtime  gabapentin 100 milliGRAM(s) Oral three times a day  glucagon  Injectable 1 milliGRAM(s) IntraMuscular once  insulin glargine Injectable (LANTUS) 25 Unit(s) SubCutaneous every morning  insulin lispro (ADMELOG) corrective regimen sliding scale   SubCutaneous three times a day before meals  insulin lispro (ADMELOG) corrective regimen sliding scale   SubCutaneous at bedtime  levothyroxine 150 MICROGram(s) Oral daily  ticagrelor 90 milliGRAM(s) Oral two times a day    Vital Signs Last 24 Hrs  T(F): 97.6 (24 Mar 2025 13:30), Max: 98.3 (24 Mar 2025 05:02)  HR: 70 (24 Mar 2025 13:30) (69 - 84)  BP: 119/62 (24 Mar 2025 13:30) (119/56 - 138/56)  RR: 18 (24 Mar 2025 13:30) (18 - 20)  SpO2: 98% (24 Mar 2025 13:30) (96% - 99%)  I&O's Summary      PHYSICAL EXAM:  General: NAD, A/O x 3  ENT: MMM  Neck: Supple, No JVD  Lungs: basal crackles   Cardio: RRR, S1/S2, No murmurs  Abdomen: Soft, Nontender, Nondistended; Bowel sounds present  Extremities: No cyanosis, No edema    LABS:                        9.0    3.85  )-----------( 178      ( 24 Mar 2025 06:22 )             26.2     03-24    140  |  106  |  45  ----------------------------<  192  4.4   |  23  |  2.0    Ca    9.5      24 Mar 2025 06:22  Phos  4.3     03-24  Mg     1.8     03-24    TPro  5.9  /  Alb  3.8  /  TBili  0.8  /  DBili  x   /  AST  28  /  ALT  17  /  AlkPhos  146  03-24 03-21 Chol 152 mg/dL LDL -- HDL 50 mg/dL Trig 139 mg/dL              POCT Blood Glucose.: 153 mg/dL (24 Mar 2025 13:16)  POCT Blood Glucose.: 178 mg/dL (24 Mar 2025 07:39)  POCT Blood Glucose.: 257 mg/dL (23 Mar 2025 21:41)  POCT Blood Glucose.: 222 mg/dL (23 Mar 2025 16:50)      Urinalysis Basic - ( 24 Mar 2025 06:22 )    Color: x / Appearance: x / SG: x / pH: x  Gluc: 192 mg/dL / Ketone: x  / Bili: x / Urobili: x   Blood: x / Protein: x / Nitrite: x   Leuk Esterase: x / RBC: x / WBC x   Sq Epi: x / Non Sq Epi: x / Bacteria: x            RADIOLOGY & ADDITIONAL TESTS:    Care Discussed with Consultants/Other Providers:

## 2025-03-25 LAB
ALBUMIN SERPL ELPH-MCNC: 3.8 G/DL — SIGNIFICANT CHANGE UP (ref 3.5–5.2)
ALP SERPL-CCNC: 141 U/L — HIGH (ref 30–115)
ALT FLD-CCNC: 16 U/L — SIGNIFICANT CHANGE UP (ref 0–41)
ANION GAP SERPL CALC-SCNC: 12 MMOL/L — SIGNIFICANT CHANGE UP (ref 7–14)
AST SERPL-CCNC: 26 U/L — SIGNIFICANT CHANGE UP (ref 0–41)
BASOPHILS # BLD AUTO: 0 K/UL — SIGNIFICANT CHANGE UP (ref 0–0.2)
BASOPHILS NFR BLD AUTO: 0 % — SIGNIFICANT CHANGE UP (ref 0–1)
BILIRUB SERPL-MCNC: 0.7 MG/DL — SIGNIFICANT CHANGE UP (ref 0.2–1.2)
BUN SERPL-MCNC: 43 MG/DL — HIGH (ref 10–20)
CALCIUM SERPL-MCNC: 9.7 MG/DL — SIGNIFICANT CHANGE UP (ref 8.4–10.5)
CHLORIDE SERPL-SCNC: 108 MMOL/L — SIGNIFICANT CHANGE UP (ref 98–110)
CO2 SERPL-SCNC: 22 MMOL/L — SIGNIFICANT CHANGE UP (ref 17–32)
CREAT SERPL-MCNC: 1.8 MG/DL — HIGH (ref 0.7–1.5)
EGFR: 29 ML/MIN/1.73M2 — LOW
EGFR: 29 ML/MIN/1.73M2 — LOW
EOSINOPHIL # BLD AUTO: 0.13 K/UL — SIGNIFICANT CHANGE UP (ref 0–0.7)
EOSINOPHIL NFR BLD AUTO: 4 % — SIGNIFICANT CHANGE UP (ref 0–8)
GLUCOSE BLDC GLUCOMTR-MCNC: 184 MG/DL — HIGH (ref 70–99)
GLUCOSE BLDC GLUCOMTR-MCNC: 190 MG/DL — HIGH (ref 70–99)
GLUCOSE BLDC GLUCOMTR-MCNC: 194 MG/DL — HIGH (ref 70–99)
GLUCOSE BLDC GLUCOMTR-MCNC: 205 MG/DL — HIGH (ref 70–99)
GLUCOSE BLDC GLUCOMTR-MCNC: 247 MG/DL — HIGH (ref 70–99)
GLUCOSE SERPL-MCNC: 171 MG/DL — HIGH (ref 70–99)
HCT VFR BLD CALC: 27 % — LOW (ref 37–47)
HGB BLD-MCNC: 8.9 G/DL — LOW (ref 12–16)
IMM GRANULOCYTES NFR BLD AUTO: 0.3 % — SIGNIFICANT CHANGE UP (ref 0.1–0.3)
IRON SATN MFR SERPL: 33 % — SIGNIFICANT CHANGE UP (ref 15–50)
IRON SATN MFR SERPL: 91 UG/DL — SIGNIFICANT CHANGE UP (ref 35–150)
LYMPHOCYTES # BLD AUTO: 0.7 K/UL — LOW (ref 1.2–3.4)
LYMPHOCYTES # BLD AUTO: 21.4 % — SIGNIFICANT CHANGE UP (ref 20.5–51.1)
MAGNESIUM SERPL-MCNC: 1.9 MG/DL — SIGNIFICANT CHANGE UP (ref 1.8–2.4)
MCHC RBC-ENTMCNC: 33 G/DL — SIGNIFICANT CHANGE UP (ref 32–37)
MCHC RBC-ENTMCNC: 37.7 PG — HIGH (ref 27–31)
MCV RBC AUTO: 114.4 FL — HIGH (ref 81–99)
MONOCYTES # BLD AUTO: 0.54 K/UL — SIGNIFICANT CHANGE UP (ref 0.1–0.6)
MONOCYTES NFR BLD AUTO: 16.5 % — HIGH (ref 1.7–9.3)
NEUTROPHILS # BLD AUTO: 1.89 K/UL — SIGNIFICANT CHANGE UP (ref 1.4–6.5)
NEUTROPHILS NFR BLD AUTO: 57.8 % — SIGNIFICANT CHANGE UP (ref 42.2–75.2)
NRBC BLD AUTO-RTO: 0 /100 WBCS — SIGNIFICANT CHANGE UP (ref 0–0)
PHOSPHATE SERPL-MCNC: 4.1 MG/DL — SIGNIFICANT CHANGE UP (ref 2.1–4.9)
PLATELET # BLD AUTO: 178 K/UL — SIGNIFICANT CHANGE UP (ref 130–400)
PMV BLD: 9.5 FL — SIGNIFICANT CHANGE UP (ref 7.4–10.4)
POTASSIUM SERPL-MCNC: 4.7 MMOL/L — SIGNIFICANT CHANGE UP (ref 3.5–5)
POTASSIUM SERPL-SCNC: 4.7 MMOL/L — SIGNIFICANT CHANGE UP (ref 3.5–5)
PROT SERPL-MCNC: 5.8 G/DL — LOW (ref 6–8)
RBC # BLD: 2.36 M/UL — LOW (ref 4.2–5.4)
RBC # FLD: 14.4 % — SIGNIFICANT CHANGE UP (ref 11.5–14.5)
SODIUM SERPL-SCNC: 142 MMOL/L — SIGNIFICANT CHANGE UP (ref 135–146)
TIBC SERPL-MCNC: 279 UG/DL — SIGNIFICANT CHANGE UP (ref 220–430)
UIBC SERPL-MCNC: 188 UG/DL — SIGNIFICANT CHANGE UP (ref 110–370)
WBC # BLD: 3.27 K/UL — LOW (ref 4.8–10.8)
WBC # FLD AUTO: 3.27 K/UL — LOW (ref 4.8–10.8)

## 2025-03-25 PROCEDURE — 99232 SBSQ HOSP IP/OBS MODERATE 35: CPT

## 2025-03-25 RX ADMIN — INSULIN LISPRO 1: 100 INJECTION, SOLUTION INTRAVENOUS; SUBCUTANEOUS at 08:09

## 2025-03-25 RX ADMIN — Medication 1 APPLICATION(S): at 05:22

## 2025-03-25 RX ADMIN — Medication 650 MILLIGRAM(S): at 08:40

## 2025-03-25 RX ADMIN — INSULIN LISPRO 1: 100 INJECTION, SOLUTION INTRAVENOUS; SUBCUTANEOUS at 17:57

## 2025-03-25 RX ADMIN — GABAPENTIN 100 MILLIGRAM(S): 400 CAPSULE ORAL at 21:31

## 2025-03-25 RX ADMIN — ATORVASTATIN CALCIUM 80 MILLIGRAM(S): 80 TABLET, FILM COATED ORAL at 21:31

## 2025-03-25 RX ADMIN — ENOXAPARIN SODIUM 40 MILLIGRAM(S): 100 INJECTION SUBCUTANEOUS at 21:32

## 2025-03-25 RX ADMIN — INSULIN LISPRO 2: 100 INJECTION, SOLUTION INTRAVENOUS; SUBCUTANEOUS at 11:31

## 2025-03-25 RX ADMIN — Medication 150 MICROGRAM(S): at 05:18

## 2025-03-25 RX ADMIN — FINASTERIDE 5 MILLIGRAM(S): 1 TABLET, FILM COATED ORAL at 21:31

## 2025-03-25 RX ADMIN — DULOXETINE 60 MILLIGRAM(S): 20 CAPSULE, DELAYED RELEASE ORAL at 11:32

## 2025-03-25 RX ADMIN — Medication 650 MILLIGRAM(S): at 08:09

## 2025-03-25 RX ADMIN — INSULIN GLARGINE-YFGN 25 UNIT(S): 100 INJECTION, SOLUTION SUBCUTANEOUS at 08:10

## 2025-03-25 RX ADMIN — Medication 81 MILLIGRAM(S): at 11:32

## 2025-03-25 RX ADMIN — GABAPENTIN 100 MILLIGRAM(S): 400 CAPSULE ORAL at 13:27

## 2025-03-25 RX ADMIN — TICAGRELOR 90 MILLIGRAM(S): 90 TABLET ORAL at 17:57

## 2025-03-25 RX ADMIN — BUPROPION HYDROBROMIDE 150 MILLIGRAM(S): 522 TABLET, EXTENDED RELEASE ORAL at 11:32

## 2025-03-25 RX ADMIN — TICAGRELOR 90 MILLIGRAM(S): 90 TABLET ORAL at 05:18

## 2025-03-25 RX ADMIN — GABAPENTIN 100 MILLIGRAM(S): 400 CAPSULE ORAL at 05:18

## 2025-03-25 NOTE — PROGRESS NOTE ADULT - ASSESSMENT
75yo woman PMH of aortic stenosis, hypertension, diabetes, CORTEZ, hypothyroidism, former smoker (quit 1996; still occasionally vapes medical marijuana) presenting with 1 day of R hand and leg weakness plus dysarthria, CTH with hypodensity in the right caudate head with small calcifications faintly evident on the last CT, CTA with near occlusion of left M1 MCA with moderate focal stenosis of the A2 segment of the right DIVINE, MRH confirms stroke subcortical L MCA territory suspect cardioembolic. Patient already taking plavix daily at baseline per primary care provider. PRU 3/21 234 suggesting plavix nonresponder.    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   -EP loop placed   -SBP goal: 120-160, allow permissive HTN.     #Macro anemia  -pt had colonoscopy within the last 6months  -iron studies are recommend       #CKD stage 3-4  -creatinine 2.0    #fluid overload  -IVF stopped  -pt doing well on ra     #Hypothyroid   Hypothyroid Synthroid 150mcg daily     #DM w/ neuropathy  -A1c 7.9  -holding home Humalog 75/25 twice daily   -3/22 start lantus 15u  -monitor for improved bs control     #sciatica  #diabetic neuropathy  gabapentin 400mg twice daily (home dose)   duloxetine 60mg daily     #Depression/Anxiety  Bupropion SR 150mg daily > interchange to bupropion XL 150mg daily     #CORTEZ  follow up pulm outpatient for better fitting CPAP  - supplemental O2 at night    Pending: improved BS,  creatinine monitor trend, iron studies

## 2025-03-25 NOTE — PROGRESS NOTE ADULT - SUBJECTIVE AND OBJECTIVE BOX
Patient is a 74y old  Female who presents with a chief complaint of stroke (25 Mar 2025 07:16)      Patient seen and examined at bedside.    ALLERGIES:  clindamycin (Rash)    MEDICATIONS:  acetaminophen     Tablet .. 650 milliGRAM(s) Oral every 6 hours PRN  aspirin  chewable 81 milliGRAM(s) Oral daily  atorvastatin 80 milliGRAM(s) Oral at bedtime  buPROPion XL (24-Hour) . 150 milliGRAM(s) Oral daily  chlorhexidine 2% Cloths 1 Application(s) Topical <User Schedule>  dextrose 5%. 1000 milliLiter(s) IV Continuous <Continuous>  dextrose 5%. 1000 milliLiter(s) IV Continuous <Continuous>  dextrose 50% Injectable 25 Gram(s) IV Push once  dextrose 50% Injectable 12.5 Gram(s) IV Push once  dextrose 50% Injectable 25 Gram(s) IV Push once  dextrose Oral Gel 15 Gram(s) Oral once PRN  DULoxetine 60 milliGRAM(s) Oral daily  enoxaparin Injectable 40 milliGRAM(s) SubCutaneous every 24 hours  finasteride 5 milliGRAM(s) Oral at bedtime  gabapentin 100 milliGRAM(s) Oral three times a day  glucagon  Injectable 1 milliGRAM(s) IntraMuscular once  insulin glargine Injectable (LANTUS) 25 Unit(s) SubCutaneous every morning  insulin lispro (ADMELOG) corrective regimen sliding scale   SubCutaneous three times a day before meals  insulin lispro (ADMELOG) corrective regimen sliding scale   SubCutaneous at bedtime  levothyroxine 150 MICROGram(s) Oral daily  ticagrelor 90 milliGRAM(s) Oral two times a day    Vital Signs Last 24 Hrs  T(F): 97.7 (25 Mar 2025 12:38), Max: 98 (25 Mar 2025 04:49)  HR: 76 (25 Mar 2025 12:38) (64 - 76)  BP: 137/73 (25 Mar 2025 12:38) (114/60 - 137/73)  RR: 18 (25 Mar 2025 12:38) (16 - 18)  SpO2: 97% (25 Mar 2025 12:38) (97% - 99%)  I&O's Summary      PHYSICAL EXAM:  General: NAD, A/O x 3  ENT: MMM  Neck: Supple, No JVD  Lungs: Clear to auscultation bilaterally  Cardio: RRR, S1/S2, No murmurs  Abdomen: Soft, Nontender, Nondistended; Bowel sounds present  Extremities: No cyanosis, No edema    LABS:                        8.9    3.27  )-----------( 178      ( 25 Mar 2025 06:36 )             27.0     03-25    142  |  108  |  43  ----------------------------<  171  4.7   |  22  |  1.8    Ca    9.7      25 Mar 2025 06:36  Phos  4.1     03-25  Mg     1.9     03-25    TPro  5.8  /  Alb  3.8  /  TBili  0.7  /  DBili  x   /  AST  26  /  ALT  16  /  AlkPhos  141  03-25 03-21 Chol 152 mg/dL LDL -- HDL 50 mg/dL Trig 139 mg/dL              POCT Blood Glucose.: 205 mg/dL (25 Mar 2025 11:22)  POCT Blood Glucose.: 190 mg/dL (25 Mar 2025 07:37)  POCT Blood Glucose.: 214 mg/dL (24 Mar 2025 21:02)  POCT Blood Glucose.: 127 mg/dL (24 Mar 2025 16:24)      Urinalysis Basic - ( 25 Mar 2025 06:36 )    Color: x / Appearance: x / SG: x / pH: x  Gluc: 171 mg/dL / Ketone: x  / Bili: x / Urobili: x   Blood: x / Protein: x / Nitrite: x   Leuk Esterase: x / RBC: x / WBC x   Sq Epi: x / Non Sq Epi: x / Bacteria: x            RADIOLOGY & ADDITIONAL TESTS:    Care Discussed with Consultants/Other Providers:

## 2025-03-25 NOTE — PROGRESS NOTE ADULT - SUBJECTIVE AND OBJECTIVE BOX
Neurology Progress Note    Interval History:    Patient has no new complaints. No overnight events.    Medications:  acetaminophen     Tablet .. 650 milliGRAM(s) Oral every 6 hours PRN  aspirin  chewable 81 milliGRAM(s) Oral daily  atorvastatin 80 milliGRAM(s) Oral at bedtime  buPROPion XL (24-Hour) . 150 milliGRAM(s) Oral daily  chlorhexidine 2% Cloths 1 Application(s) Topical <User Schedule>  dextrose 5%. 1000 milliLiter(s) IV Continuous <Continuous>  dextrose 5%. 1000 milliLiter(s) IV Continuous <Continuous>  dextrose 50% Injectable 25 Gram(s) IV Push once  dextrose 50% Injectable 12.5 Gram(s) IV Push once  dextrose 50% Injectable 25 Gram(s) IV Push once  dextrose Oral Gel 15 Gram(s) Oral once PRN  DULoxetine 60 milliGRAM(s) Oral daily  enoxaparin Injectable 40 milliGRAM(s) SubCutaneous every 24 hours  finasteride 5 milliGRAM(s) Oral at bedtime  gabapentin 100 milliGRAM(s) Oral three times a day  glucagon  Injectable 1 milliGRAM(s) IntraMuscular once  insulin glargine Injectable (LANTUS) 25 Unit(s) SubCutaneous every morning  insulin lispro (ADMELOG) corrective regimen sliding scale   SubCutaneous three times a day before meals  insulin lispro (ADMELOG) corrective regimen sliding scale   SubCutaneous at bedtime  levothyroxine 150 MICROGram(s) Oral daily  ticagrelor 90 milliGRAM(s) Oral two times a day      Vital Signs Last 24 Hrs  T(C): 36.7 (25 Mar 2025 04:49), Max: 36.7 (25 Mar 2025 04:49)  T(F): 98 (25 Mar 2025 04:49), Max: 98 (25 Mar 2025 04:49)  HR: 64 (25 Mar 2025 04:49) (64 - 77)  BP: 114/60 (25 Mar 2025 04:49) (114/60 - 138/56)  BP(mean): 70 (25 Mar 2025 04:49) (70 - 90)  RR: 18 (25 Mar 2025 04:49) (16 - 20)  SpO2: 97% (25 Mar 2025 04:49) (97% - 99%)    Parameters below as of 25 Mar 2025 04:49  Patient On (Oxygen Delivery Method): nasal cannula  O2 Flow (L/min): 2      Neurological Examination:  General:  Appearance is consistent with chronologic age.  No abnormal facies.  Gross skin survey within normal limits.    Cognitive/Language: The patient is oriented to person, place, time and date. Language with normal repetition, comprehension and naming. Nondysarthria  Eyes: VFF. EOMI w/o nystagmus or reported double vision. PERRL. No ptosis/weakness of eyelid closure.    Face: Facial sensation normal V1 - 3, no facial asymmetry.  Ears/Nose/Throat: Hearing grossly intact b/l. Palate elevates midline. Tongue and uvula midline.   Motor examination: Normal tone. No tremors or involuntary movements.  Strength Exam (MRC scale): 4+/5 RUE strength, 5/5 with rest of extremities.  Reflexes: 2+ b/l biceps and triceps reflexes, 1+ b/l brachioradialis, patella and Achilles reflexes. Plantar response downgoing b/l.  Sensory examination: Intact to light touch bilaterally, no extinction  Cerebellum: b/l FTN intact   NIHSS 0    Labs:  CBC Full  -  ( 25 Mar 2025 06:36 )  WBC Count : 3.27 K/uL  RBC Count : 2.36 M/uL  Hemoglobin : 8.9 g/dL  Hematocrit : 27.0 %  Platelet Count - Automated : 178 K/uL  Mean Cell Volume : 114.4 fL  Mean Cell Hemoglobin : 37.7 pg  Mean Cell Hemoglobin Concentration : 33.0 g/dL  Auto Neutrophil # : x  Auto Lymphocyte # : x  Auto Monocyte # : x  Auto Eosinophil # : x  Auto Basophil # : x  Auto Neutrophil % : x  Auto Lymphocyte % : x  Auto Monocyte % : x  Auto Eosinophil % : x  Auto Basophil % : x    03-25    142  |  108  |  43[H]  ----------------------------<  171[H]  4.7   |  22  |  1.8[H]    Ca    9.7      25 Mar 2025 06:36  Phos  4.1     03-25  Mg     1.9     03-25    TPro  5.8[L]  /  Alb  3.8  /  TBili  0.7  /  DBili  x   /  AST  26  /  ALT  16  /  AlkPhos  141[H]  03-25    LIVER FUNCTIONS - ( 25 Mar 2025 06:36 )  Alb: 3.8 g/dL / Pro: 5.8 g/dL / ALK PHOS: 141 U/L / ALT: 16 U/L / AST: 26 U/L / GGT: x             Urinalysis Basic - ( 25 Mar 2025 06:36 )    Color: x / Appearance: x / SG: x / pH: x  Gluc: 171 mg/dL / Ketone: x  / Bili: x / Urobili: x   Blood: x / Protein: x / Nitrite: x   Leuk Esterase: x / RBC: x / WBC x   Sq Epi: x / Non Sq Epi: x / Bacteria: x

## 2025-03-25 NOTE — PROGRESS NOTE ADULT - ASSESSMENT
73yo woman PMH of aortic stenosis, hypertension, diabetes, CORTEZ, hypothyroidism, former smoker (quit 1996; still occasionally vapes medical marijuana) presenting with 1 day of R hand and leg weakness plus dysarthria, CTH with hypodensity in the right caudate head with small calcifications faintly evident on the last CT, CTA with near occlusion of left M1 MCA with moderate focal stenosis of the A2 segment of the right DIVINE, MRH confirms stroke subcortical L MCA territory suspect cardioembolic. Patient already taking plavix daily at baseline per primary care provider. PRU 3/21 234 suggesting plavix nonresponder.    #Subcortical L MCA territory suspect cardioembolic  #HTN  - s/p DAPT load  - antiplatelets/anticoagulants: aspirin 81mg daily , clopidogrel 75mg daily > switch to brilinta 90mg twice daily   - antilipemics: atorvastatin 80mg nightly inpatient  - SBP goal: 120 - 160  - q8h neuro and vitals checks  - holding home lisinopril- hydrochlorothiazide 20/25 mg twice daily due to CKD  - c/w nifedipine 30 mg daily  - s/p loop recorder placement 3/24    #CKD baseline 2.3-3.4  - Cr improved from previous  - 75c LR 24 hours, stopped as per hospitalist recs    #CORTEZ  - follow up pulm outpatient for better fitting CPAP  - supplemental O2 at night    #Hypothyroid  - c/w home Synthroid 150mcg daily     #DM w/ neuropathy  - A1c 7.9  - holding home Humalog 75/25 ~45U twice daily   - hospitalist advising converting to insulin NPH 30U twice daily > will start on 15U twice daily and increase as tolerated/required  - insulin corrective sliding scale     #sciatica  #diabetic neuropathy  - gabapentin 400mg twice daily (home dose) > converted to 100mg three times daily per CrCl  - duloxetine 60mg daily     #hair loss  - finasteride 5mg daily 5mg nightly (home dose)     #Depression/Anxiety  - Bupropion SR 150mg daily > interchange to bupropion XL 150mg daily     --------------------------------------------------  #prophylactic measures  - DVT prophylaxis: lovenox / SCDs  - GI prophylaxis: not indicated at this time  - Diet: DASH/TLC / consistent carbohydrate    #Dispo: [facility] pending approval

## 2025-03-26 LAB
ALBUMIN SERPL ELPH-MCNC: 3.7 G/DL — SIGNIFICANT CHANGE UP (ref 3.5–5.2)
ALP SERPL-CCNC: 170 U/L — HIGH (ref 30–115)
ALT FLD-CCNC: 21 U/L — SIGNIFICANT CHANGE UP (ref 0–41)
ANION GAP SERPL CALC-SCNC: 11 MMOL/L — SIGNIFICANT CHANGE UP (ref 7–14)
AST SERPL-CCNC: 32 U/L — SIGNIFICANT CHANGE UP (ref 0–41)
BASOPHILS # BLD AUTO: 0.01 K/UL — SIGNIFICANT CHANGE UP (ref 0–0.2)
BASOPHILS NFR BLD AUTO: 0.4 % — SIGNIFICANT CHANGE UP (ref 0–1)
BILIRUB SERPL-MCNC: 0.4 MG/DL — SIGNIFICANT CHANGE UP (ref 0.2–1.2)
BUN SERPL-MCNC: 49 MG/DL — HIGH (ref 10–20)
CALCIUM SERPL-MCNC: 9.3 MG/DL — SIGNIFICANT CHANGE UP (ref 8.4–10.5)
CHLORIDE SERPL-SCNC: 107 MMOL/L — SIGNIFICANT CHANGE UP (ref 98–110)
CO2 SERPL-SCNC: 22 MMOL/L — SIGNIFICANT CHANGE UP (ref 17–32)
CREAT SERPL-MCNC: 1.7 MG/DL — HIGH (ref 0.7–1.5)
EGFR: 31 ML/MIN/1.73M2 — LOW
EGFR: 31 ML/MIN/1.73M2 — LOW
EOSINOPHIL # BLD AUTO: 0.13 K/UL — SIGNIFICANT CHANGE UP (ref 0–0.7)
EOSINOPHIL NFR BLD AUTO: 4.8 % — SIGNIFICANT CHANGE UP (ref 0–8)
FERRITIN SERPL-MCNC: 305 NG/ML — SIGNIFICANT CHANGE UP (ref 13–330)
GLUCOSE BLDC GLUCOMTR-MCNC: 179 MG/DL — HIGH (ref 70–99)
GLUCOSE BLDC GLUCOMTR-MCNC: 180 MG/DL — HIGH (ref 70–99)
GLUCOSE BLDC GLUCOMTR-MCNC: 187 MG/DL — HIGH (ref 70–99)
GLUCOSE BLDC GLUCOMTR-MCNC: 202 MG/DL — HIGH (ref 70–99)
GLUCOSE SERPL-MCNC: 176 MG/DL — HIGH (ref 70–99)
HCT VFR BLD CALC: 25.4 % — LOW (ref 37–47)
HGB BLD-MCNC: 8.8 G/DL — LOW (ref 12–16)
IMM GRANULOCYTES NFR BLD AUTO: 0.7 % — HIGH (ref 0.1–0.3)
LYMPHOCYTES # BLD AUTO: 0.8 K/UL — LOW (ref 1.2–3.4)
LYMPHOCYTES # BLD AUTO: 29.4 % — SIGNIFICANT CHANGE UP (ref 20.5–51.1)
MAGNESIUM SERPL-MCNC: 1.6 MG/DL — LOW (ref 1.8–2.4)
MCHC RBC-ENTMCNC: 34.6 G/DL — SIGNIFICANT CHANGE UP (ref 32–37)
MCHC RBC-ENTMCNC: 37.8 PG — HIGH (ref 27–31)
MCV RBC AUTO: 109 FL — HIGH (ref 81–99)
MONOCYTES # BLD AUTO: 0.42 K/UL — SIGNIFICANT CHANGE UP (ref 0.1–0.6)
MONOCYTES NFR BLD AUTO: 15.4 % — HIGH (ref 1.7–9.3)
NEUTROPHILS # BLD AUTO: 1.34 K/UL — LOW (ref 1.4–6.5)
NEUTROPHILS NFR BLD AUTO: 49.3 % — SIGNIFICANT CHANGE UP (ref 42.2–75.2)
NRBC BLD AUTO-RTO: 0 /100 WBCS — SIGNIFICANT CHANGE UP (ref 0–0)
PHOSPHATE SERPL-MCNC: 4.1 MG/DL — SIGNIFICANT CHANGE UP (ref 2.1–4.9)
PLATELET # BLD AUTO: 176 K/UL — SIGNIFICANT CHANGE UP (ref 130–400)
PMV BLD: 9.4 FL — SIGNIFICANT CHANGE UP (ref 7.4–10.4)
POTASSIUM SERPL-MCNC: 4.5 MMOL/L — SIGNIFICANT CHANGE UP (ref 3.5–5)
POTASSIUM SERPL-SCNC: 4.5 MMOL/L — SIGNIFICANT CHANGE UP (ref 3.5–5)
PROT SERPL-MCNC: 5.5 G/DL — LOW (ref 6–8)
RBC # BLD: 2.33 M/UL — LOW (ref 4.2–5.4)
RBC # FLD: 13.4 % — SIGNIFICANT CHANGE UP (ref 11.5–14.5)
SODIUM SERPL-SCNC: 140 MMOL/L — SIGNIFICANT CHANGE UP (ref 135–146)
WBC # BLD: 2.72 K/UL — LOW (ref 4.8–10.8)
WBC # FLD AUTO: 2.72 K/UL — LOW (ref 4.8–10.8)

## 2025-03-26 PROCEDURE — 99232 SBSQ HOSP IP/OBS MODERATE 35: CPT

## 2025-03-26 RX ADMIN — DULOXETINE 60 MILLIGRAM(S): 20 CAPSULE, DELAYED RELEASE ORAL at 12:07

## 2025-03-26 RX ADMIN — INSULIN LISPRO 2: 100 INJECTION, SOLUTION INTRAVENOUS; SUBCUTANEOUS at 08:09

## 2025-03-26 RX ADMIN — BUPROPION HYDROBROMIDE 150 MILLIGRAM(S): 522 TABLET, EXTENDED RELEASE ORAL at 12:38

## 2025-03-26 RX ADMIN — GABAPENTIN 100 MILLIGRAM(S): 400 CAPSULE ORAL at 05:09

## 2025-03-26 RX ADMIN — Medication 81 MILLIGRAM(S): at 12:07

## 2025-03-26 RX ADMIN — Medication 650 MILLIGRAM(S): at 09:10

## 2025-03-26 RX ADMIN — ENOXAPARIN SODIUM 40 MILLIGRAM(S): 100 INJECTION SUBCUTANEOUS at 21:20

## 2025-03-26 RX ADMIN — FINASTERIDE 5 MILLIGRAM(S): 1 TABLET, FILM COATED ORAL at 21:20

## 2025-03-26 RX ADMIN — INSULIN LISPRO 1: 100 INJECTION, SOLUTION INTRAVENOUS; SUBCUTANEOUS at 17:06

## 2025-03-26 RX ADMIN — INSULIN LISPRO 0: 100 INJECTION, SOLUTION INTRAVENOUS; SUBCUTANEOUS at 21:22

## 2025-03-26 RX ADMIN — TICAGRELOR 90 MILLIGRAM(S): 90 TABLET ORAL at 17:07

## 2025-03-26 RX ADMIN — GABAPENTIN 100 MILLIGRAM(S): 400 CAPSULE ORAL at 21:21

## 2025-03-26 RX ADMIN — Medication 650 MILLIGRAM(S): at 08:10

## 2025-03-26 RX ADMIN — TICAGRELOR 90 MILLIGRAM(S): 90 TABLET ORAL at 05:09

## 2025-03-26 RX ADMIN — Medication 150 MICROGRAM(S): at 05:09

## 2025-03-26 RX ADMIN — GABAPENTIN 100 MILLIGRAM(S): 400 CAPSULE ORAL at 14:10

## 2025-03-26 RX ADMIN — INSULIN LISPRO 1: 100 INJECTION, SOLUTION INTRAVENOUS; SUBCUTANEOUS at 12:07

## 2025-03-26 RX ADMIN — Medication 1 APPLICATION(S): at 05:11

## 2025-03-26 RX ADMIN — INSULIN GLARGINE-YFGN 25 UNIT(S): 100 INJECTION, SOLUTION SUBCUTANEOUS at 08:10

## 2025-03-26 RX ADMIN — ATORVASTATIN CALCIUM 80 MILLIGRAM(S): 80 TABLET, FILM COATED ORAL at 21:20

## 2025-03-26 NOTE — PROGRESS NOTE ADULT - ASSESSMENT
75yo woman PMH of aortic stenosis, hypertension, diabetes, CORTEZ, hypothyroidism, former smoker (quit 1996; still occasionally vapes medical marijuana) presenting with 1 day of R hand and leg weakness plus dysarthria, CTH with hypodensity in the right caudate head with small calcifications faintly evident on the last CT, CTA with near occlusion of left M1 MCA with moderate focal stenosis of the A2 segment of the right DIVINE, MRH confirms stroke subcortical L MCA territory suspect cardioembolic. Patient already taking plavix daily at baseline per primary care provider. PRU 3/21 234 suggesting plavix nonresponder, Plavix switched to Brilinta.    #Subcortical L MCA territory suspect cardioembolic  #HTN  - s/p DAPT load  - antiplatelets/anticoagulants: Aspirin 81mg daily, Brilinta 90mg twice daily   - antilipemics: atorvastatin 80mg nightly inpatient  - SBP goal: 120 - 160  - q8h neuro and vitals checks  - holding home lisinopril- hydrochlorothiazide 20/25 mg twice daily  - c/w nifedipine 30 mg daily  - s/p loop recorder placement 3/24    #CKD baseline 2.3-3.4  - Cr improved from previous  - s/p 75c LR 24 hours, stopped as per hospitalist recs    #CORTEZ  - follow up pulm outpatient for better fitting CPAP  - supplemental O2 at night    #Hypothyroid  - c/w home Synthroid 150mcg daily     #DM w/ neuropathy  - A1c 7.9  - holding home Humalog 75/25 ~45U twice daily   - hospitalist advising converting to insulin NPH 30U twice daily > will start on 15U twice daily and increase as tolerated/required  - insulin corrective sliding scale     #sciatica  #diabetic neuropathy  - gabapentin 400mg twice daily (home dose) > converted to 100mg three times daily per CrCl  - duloxetine 60mg daily     #hair loss  - finasteride 5mg daily 5mg nightly (home dose)     #Depression/Anxiety  - Bupropion SR 150mg daily    --------------------------------------------------  #prophylactic measures  - DVT prophylaxis: lovenox / SCDs  - GI prophylaxis: not indicated at this time  - Diet: DASH/TLC / consistent carbohydrate    #Dispo: [facility] pending approval

## 2025-03-26 NOTE — PROGRESS NOTE ADULT - SUBJECTIVE AND OBJECTIVE BOX
Patient is a 74y old  Female who presents with a chief complaint of stroke (25 Mar 2025 07:16)      Patient seen and examined at bedside. No acute overnight events. No acute complaints this morning. ROS otherwise negative on a 10-point assessment.     ALLERGIES:  clindamycin (Rash)    MEDICATIONS  (STANDING):  aspirin  chewable 81 milliGRAM(s) Oral daily  atorvastatin 80 milliGRAM(s) Oral at bedtime  buPROPion XL (24-Hour) . 150 milliGRAM(s) Oral daily  chlorhexidine 2% Cloths 1 Application(s) Topical <User Schedule>  dextrose 5%. 1000 milliLiter(s) (50 mL/Hr) IV Continuous <Continuous>  dextrose 5%. 1000 milliLiter(s) (100 mL/Hr) IV Continuous <Continuous>  dextrose 50% Injectable 25 Gram(s) IV Push once  dextrose 50% Injectable 12.5 Gram(s) IV Push once  dextrose 50% Injectable 25 Gram(s) IV Push once  DULoxetine 60 milliGRAM(s) Oral daily  enoxaparin Injectable 40 milliGRAM(s) SubCutaneous every 24 hours  finasteride 5 milliGRAM(s) Oral at bedtime  gabapentin 100 milliGRAM(s) Oral three times a day  glucagon  Injectable 1 milliGRAM(s) IntraMuscular once  insulin glargine Injectable (LANTUS) 25 Unit(s) SubCutaneous every morning  insulin lispro (ADMELOG) corrective regimen sliding scale   SubCutaneous three times a day before meals  insulin lispro (ADMELOG) corrective regimen sliding scale   SubCutaneous at bedtime  levothyroxine 150 MICROGram(s) Oral daily  ticagrelor 90 milliGRAM(s) Oral two times a day    MEDICATIONS  (PRN):  acetaminophen     Tablet .. 650 milliGRAM(s) Oral every 6 hours PRN Temp greater or equal to 38C (100.4F), Mild Pain (1 - 3)  dextrose Oral Gel 15 Gram(s) Oral once PRN Blood Glucose LESS THAN 70 milliGRAM(s)/deciliter      Vital Signs Last 24 Hrs  T(C): 36.3 (26 Mar 2025 13:00), Max: 36.6 (25 Mar 2025 20:40)  T(F): 97.4 (26 Mar 2025 13:00), Max: 97.9 (26 Mar 2025 04:53)  HR: 72 (26 Mar 2025 13:00) (72 - 78)  BP: 138/75 (26 Mar 2025 13:00) (134/60 - 148/74)  BP(mean): 96 (26 Mar 2025 13:00) (88 - 96)  RR: 18 (26 Mar 2025 13:00) (18 - 18)  SpO2: 98% (26 Mar 2025 13:00) (95% - 99%)    Parameters below as of 26 Mar 2025 13:00  Patient On (Oxygen Delivery Method): room air        PHYSICAL EXAM:  General: NAD, A/O x 3  ENT: MMM  Neck: Supple, No JVD  Lungs: Clear to auscultation bilaterally  Cardio: RRR, S1/S2, No murmurs  Abdomen: Soft, Nontender, Nondistended; Bowel sounds present  Extremities: No cyanosis, No edema    LABS:                                   8.8    2.72  )-----------( 176      ( 26 Mar 2025 05:53 )             25.4     03-26    140  |  107  |  49  ----------------------------<  176  4.5   |  22  |  1.7    Ca    9.3      26 Mar 2025 05:53  Phos  4.1     03-26  Mg     1.6     03-26    TPro  5.5  /  Alb  3.7  /  TBili  0.4  /  DBili  x   /  AST  32  /  ALT  21  /  AlkPhos  170  03-26    eGFR: 31 mL/min/1.73m2 (26 Mar 2025 05:53)        03-21 Chol 152 mg/dL LDL -- HDL 50 mg/dL Trig 139 mg/dL      CAPILLARY BLOOD GLUCOSE      POCT Blood Glucose.: 187 mg/dL (26 Mar 2025 16:50)  POCT Blood Glucose.: 180 mg/dL (26 Mar 2025 11:45)  POCT Blood Glucose.: 202 mg/dL (26 Mar 2025 08:02)  POCT Blood Glucose.: 247 mg/dL (25 Mar 2025 21:07)  POCT Blood Glucose.: 184 mg/dL (25 Mar 2025 17:54)        Urinalysis Basic - ( 25 Mar 2025 06:36 )    Color: x / Appearance: x / SG: x / pH: x  Gluc: 171 mg/dL / Ketone: x  / Bili: x / Urobili: x   Blood: x / Protein: x / Nitrite: x   Leuk Esterase: x / RBC: x / WBC x   Sq Epi: x / Non Sq Epi: x / Bacteria: x            RADIOLOGY & ADDITIONAL TESTS:    Care Discussed with Consultants/Other Providers:

## 2025-03-26 NOTE — PROGRESS NOTE ADULT - SUBJECTIVE AND OBJECTIVE BOX
Neurology Progress Note    Interval History:    Patient has no complaints, no overnight events.    Medications:  acetaminophen     Tablet .. 650 milliGRAM(s) Oral every 6 hours PRN  aspirin  chewable 81 milliGRAM(s) Oral daily  atorvastatin 80 milliGRAM(s) Oral at bedtime  buPROPion XL (24-Hour) . 150 milliGRAM(s) Oral daily  chlorhexidine 2% Cloths 1 Application(s) Topical <User Schedule>  dextrose 5%. 1000 milliLiter(s) IV Continuous <Continuous>  dextrose 5%. 1000 milliLiter(s) IV Continuous <Continuous>  dextrose 50% Injectable 25 Gram(s) IV Push once  dextrose 50% Injectable 25 Gram(s) IV Push once  dextrose 50% Injectable 12.5 Gram(s) IV Push once  dextrose Oral Gel 15 Gram(s) Oral once PRN  DULoxetine 60 milliGRAM(s) Oral daily  enoxaparin Injectable 40 milliGRAM(s) SubCutaneous every 24 hours  finasteride 5 milliGRAM(s) Oral at bedtime  gabapentin 100 milliGRAM(s) Oral three times a day  glucagon  Injectable 1 milliGRAM(s) IntraMuscular once  insulin glargine Injectable (LANTUS) 25 Unit(s) SubCutaneous every morning  insulin lispro (ADMELOG) corrective regimen sliding scale   SubCutaneous three times a day before meals  insulin lispro (ADMELOG) corrective regimen sliding scale   SubCutaneous at bedtime  levothyroxine 150 MICROGram(s) Oral daily  ticagrelor 90 milliGRAM(s) Oral two times a day      Vital Signs Last 24 Hrs  T(C): 36.6 (26 Mar 2025 04:53), Max: 36.6 (25 Mar 2025 20:40)  T(F): 97.9 (26 Mar 2025 04:53), Max: 97.9 (26 Mar 2025 04:53)  HR: 72 (26 Mar 2025 04:53) (72 - 78)  BP: 134/60 (26 Mar 2025 04:53) (134/60 - 148/74)  BP(mean): 88 (26 Mar 2025 04:53) (88 - 94)  RR: 18 (26 Mar 2025 04:53) (18 - 18)  SpO2: 99% (26 Mar 2025 04:53) (95% - 99%)    Parameters below as of 26 Mar 2025 04:53  Patient On (Oxygen Delivery Method): nasal cannula  O2 Flow (L/min): 2      Neurological Examination:  General:  Appearance is consistent with chronologic age.  No abnormal facies.  Gross skin survey within normal limits.    Cognitive/Language: The patient is oriented to person, place, time and date. Language with normal repetition, comprehension and naming. Nondysarthria  Eyes: VFF. EOMI w/o nystagmus or reported double vision. PERRL. No ptosis/weakness of eyelid closure.    Face: Facial sensation normal V1 - 3, no facial asymmetry.  Ears/Nose/Throat: Hearing grossly intact b/l. Palate elevates midline. Tongue and uvula midline.   Motor examination: Normal tone. No tremors or involuntary movements.  Strength Exam (MRC scale): 4+/5 RUE strength, 5/5 with rest of extremities.  Reflexes: 2+ b/l biceps and triceps reflexes, 1+ b/l brachioradialis, patella and Achilles reflexes. Plantar response downgoing b/l.  Sensory examination: Intact to light touch bilaterally, no extinction  Cerebellum: b/l FTN intact   NIHSS 0    Labs:  CBC Full  -  ( 26 Mar 2025 05:53 )  WBC Count : 2.72 K/uL  RBC Count : 2.33 M/uL  Hemoglobin : 8.8 g/dL  Hematocrit : 25.4 %  Platelet Count - Automated : 176 K/uL  Mean Cell Volume : 109.0 fL  Mean Cell Hemoglobin : 37.8 pg  Mean Cell Hemoglobin Concentration : 34.6 g/dL  Auto Neutrophil # : x  Auto Lymphocyte # : x  Auto Monocyte # : x  Auto Eosinophil # : x  Auto Basophil # : x  Auto Neutrophil % : x  Auto Lymphocyte % : x  Auto Monocyte % : x  Auto Eosinophil % : x  Auto Basophil % : x    03-26    140  |  107  |  49[H]  ----------------------------<  176[H]  4.5   |  22  |  1.7[H]    Ca    9.3      26 Mar 2025 05:53  Phos  4.1     03-26  Mg     1.6     03-26    TPro  5.5[L]  /  Alb  3.7  /  TBili  0.4  /  DBili  x   /  AST  32  /  ALT  21  /  AlkPhos  170[H]  03-26    LIVER FUNCTIONS - ( 26 Mar 2025 05:53 )  Alb: 3.7 g/dL / Pro: 5.5 g/dL / ALK PHOS: 170 U/L / ALT: 21 U/L / AST: 32 U/L / GGT: x             Urinalysis Basic - ( 26 Mar 2025 05:53 )    Color: x / Appearance: x / SG: x / pH: x  Gluc: 176 mg/dL / Ketone: x  / Bili: x / Urobili: x   Blood: x / Protein: x / Nitrite: x   Leuk Esterase: x / RBC: x / WBC x   Sq Epi: x / Non Sq Epi: x / Bacteria: x

## 2025-03-26 NOTE — PROGRESS NOTE ADULT - ASSESSMENT
73yo woman PMH of aortic stenosis, hypertension, diabetes, CORTEZ, hypothyroidism, former smoker (quit 1996; still occasionally vapes medical marijuana) presenting with 1 day of R hand and leg weakness plus dysarthria, CTH with hypodensity in the right caudate head with small calcifications faintly evident on the last CT, CTA with near occlusion of left M1 MCA with moderate focal stenosis of the A2 segment of the right DIVINE, MRH confirms stroke subcortical L MCA territory suspect cardioembolic. Patient already taking plavix daily at baseline per primary care provider. PRU 3/21 234 suggesting plavix nonresponder.    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   -EP loop placed   -SBP goal: 120-160, allow permissive HTN.     #Macrocytic anemia  #Leukopenia   -pt had colonoscopy within the last 6months  - recommend complete anemia panel (B12/folate/iron/TIBC/ferritin)      #BARBER on CKD stage 3b (improved)  -creatinine 1.7  - monitor renal fxn and electrolytes daily   - renally dose medications  - avoid nephrotoxic medications     #fluid overload  -IVF stopped  -pt doing well on ra     #Hypothyroid   Hypothyroid Synthroid 150mcg daily     #DM w/ neuropathy  -A1c 7.9  -holding home Humalog 75/25 twice daily   - c/w Lantus 25 units qhs (currently qam)  - FG TIDAC/HS  - ISS for glucose >200 (target 110-180 while inpatient)  - Hypoglycemic protocol PRN  - Calculate 24H insulin requirement daily, if indicated     #sciatica  #diabetic neuropathy  gabapentin 400mg twice daily (home dose)  duloxetine 60mg daily     #Depression/Anxiety  Bupropion SR 150mg daily > interchange to bupropion XL 150mg daily     #CORTEZ  follow up pulm outpatient for better fitting CPAP  - nocturnal CPAP and PRN   - Supplemental oxygen PRN to maintain PO >92%    Management per Neurology, Medicine for co-management.      Total time spent to complete patient's bedside assessment, reviewed medical chart, discussed medical plan of care with team was more than 35 minutes with >50% of time spent face to face with patient, discussion with patient/family and/or coordination of care.

## 2025-03-27 LAB
ALBUMIN SERPL ELPH-MCNC: 3.7 G/DL — SIGNIFICANT CHANGE UP (ref 3.5–5.2)
ALP SERPL-CCNC: 169 U/L — HIGH (ref 30–115)
ALT FLD-CCNC: 22 U/L — SIGNIFICANT CHANGE UP (ref 0–41)
ANION GAP SERPL CALC-SCNC: 12 MMOL/L — SIGNIFICANT CHANGE UP (ref 7–14)
AST SERPL-CCNC: 31 U/L — SIGNIFICANT CHANGE UP (ref 0–41)
BASOPHILS # BLD AUTO: 0 K/UL — SIGNIFICANT CHANGE UP (ref 0–0.2)
BASOPHILS NFR BLD AUTO: 0 % — SIGNIFICANT CHANGE UP (ref 0–1)
BILIRUB SERPL-MCNC: 0.4 MG/DL — SIGNIFICANT CHANGE UP (ref 0.2–1.2)
BUN SERPL-MCNC: 48 MG/DL — HIGH (ref 10–20)
CALCIUM SERPL-MCNC: 9.6 MG/DL — SIGNIFICANT CHANGE UP (ref 8.4–10.5)
CHLORIDE SERPL-SCNC: 109 MMOL/L — SIGNIFICANT CHANGE UP (ref 98–110)
CO2 SERPL-SCNC: 22 MMOL/L — SIGNIFICANT CHANGE UP (ref 17–32)
CREAT SERPL-MCNC: 1.7 MG/DL — HIGH (ref 0.7–1.5)
EGFR: 31 ML/MIN/1.73M2 — LOW
EGFR: 31 ML/MIN/1.73M2 — LOW
EOSINOPHIL # BLD AUTO: 0.12 K/UL — SIGNIFICANT CHANGE UP (ref 0–0.7)
EOSINOPHIL NFR BLD AUTO: 4.3 % — SIGNIFICANT CHANGE UP (ref 0–8)
GLUCOSE BLDC GLUCOMTR-MCNC: 165 MG/DL — HIGH (ref 70–99)
GLUCOSE BLDC GLUCOMTR-MCNC: 193 MG/DL — HIGH (ref 70–99)
GLUCOSE BLDC GLUCOMTR-MCNC: 195 MG/DL — HIGH (ref 70–99)
GLUCOSE BLDC GLUCOMTR-MCNC: 228 MG/DL — HIGH (ref 70–99)
GLUCOSE BLDC GLUCOMTR-MCNC: 248 MG/DL — HIGH (ref 70–99)
GLUCOSE SERPL-MCNC: 144 MG/DL — HIGH (ref 70–99)
HCT VFR BLD CALC: 25.6 % — LOW (ref 37–47)
HGB BLD-MCNC: 8.7 G/DL — LOW (ref 12–16)
LYMPHOCYTES # BLD AUTO: 0.66 K/UL — LOW (ref 1.2–3.4)
LYMPHOCYTES # BLD AUTO: 23.5 % — SIGNIFICANT CHANGE UP (ref 20.5–51.1)
MAGNESIUM SERPL-MCNC: 1.6 MG/DL — LOW (ref 1.8–2.4)
MCHC RBC-ENTMCNC: 34 G/DL — SIGNIFICANT CHANGE UP (ref 32–37)
MCHC RBC-ENTMCNC: 37.3 PG — HIGH (ref 27–31)
MCV RBC AUTO: 109.9 FL — HIGH (ref 81–99)
MONOCYTES # BLD AUTO: 0.17 K/UL — SIGNIFICANT CHANGE UP (ref 0.1–0.6)
MONOCYTES NFR BLD AUTO: 6.1 % — SIGNIFICANT CHANGE UP (ref 1.7–9.3)
NEUTROPHILS # BLD AUTO: 1.78 K/UL — SIGNIFICANT CHANGE UP (ref 1.4–6.5)
NEUTROPHILS NFR BLD AUTO: 63.5 % — SIGNIFICANT CHANGE UP (ref 42.2–75.2)
PHOSPHATE SERPL-MCNC: 3.9 MG/DL — SIGNIFICANT CHANGE UP (ref 2.1–4.9)
PLATELET # BLD AUTO: 181 K/UL — SIGNIFICANT CHANGE UP (ref 130–400)
PMV BLD: 9.4 FL — SIGNIFICANT CHANGE UP (ref 7.4–10.4)
POTASSIUM SERPL-MCNC: 4.7 MMOL/L — SIGNIFICANT CHANGE UP (ref 3.5–5)
POTASSIUM SERPL-SCNC: 4.7 MMOL/L — SIGNIFICANT CHANGE UP (ref 3.5–5)
PROT SERPL-MCNC: 5.7 G/DL — LOW (ref 6–8)
RBC # BLD: 2.33 M/UL — LOW (ref 4.2–5.4)
RBC # FLD: 14 % — SIGNIFICANT CHANGE UP (ref 11.5–14.5)
SODIUM SERPL-SCNC: 143 MMOL/L — SIGNIFICANT CHANGE UP (ref 135–146)
WBC # BLD: 2.81 K/UL — LOW (ref 4.8–10.8)
WBC # FLD AUTO: 2.81 K/UL — LOW (ref 4.8–10.8)

## 2025-03-27 PROCEDURE — 99232 SBSQ HOSP IP/OBS MODERATE 35: CPT

## 2025-03-27 RX ADMIN — INSULIN LISPRO 1: 100 INJECTION, SOLUTION INTRAVENOUS; SUBCUTANEOUS at 11:54

## 2025-03-27 RX ADMIN — INSULIN GLARGINE-YFGN 25 UNIT(S): 100 INJECTION, SOLUTION SUBCUTANEOUS at 08:42

## 2025-03-27 RX ADMIN — DULOXETINE 60 MILLIGRAM(S): 20 CAPSULE, DELAYED RELEASE ORAL at 11:54

## 2025-03-27 RX ADMIN — INSULIN LISPRO 2: 100 INJECTION, SOLUTION INTRAVENOUS; SUBCUTANEOUS at 17:24

## 2025-03-27 RX ADMIN — Medication 81 MILLIGRAM(S): at 11:54

## 2025-03-27 RX ADMIN — ENOXAPARIN SODIUM 40 MILLIGRAM(S): 100 INJECTION SUBCUTANEOUS at 21:33

## 2025-03-27 RX ADMIN — BUPROPION HYDROBROMIDE 150 MILLIGRAM(S): 522 TABLET, EXTENDED RELEASE ORAL at 11:54

## 2025-03-27 RX ADMIN — Medication 150 MICROGRAM(S): at 05:42

## 2025-03-27 RX ADMIN — FINASTERIDE 5 MILLIGRAM(S): 1 TABLET, FILM COATED ORAL at 21:34

## 2025-03-27 RX ADMIN — ATORVASTATIN CALCIUM 80 MILLIGRAM(S): 80 TABLET, FILM COATED ORAL at 21:34

## 2025-03-27 RX ADMIN — GABAPENTIN 100 MILLIGRAM(S): 400 CAPSULE ORAL at 21:34

## 2025-03-27 RX ADMIN — GABAPENTIN 100 MILLIGRAM(S): 400 CAPSULE ORAL at 13:33

## 2025-03-27 RX ADMIN — GABAPENTIN 100 MILLIGRAM(S): 400 CAPSULE ORAL at 05:42

## 2025-03-27 RX ADMIN — Medication 1 APPLICATION(S): at 05:46

## 2025-03-27 RX ADMIN — TICAGRELOR 90 MILLIGRAM(S): 90 TABLET ORAL at 05:42

## 2025-03-27 RX ADMIN — TICAGRELOR 90 MILLIGRAM(S): 90 TABLET ORAL at 17:25

## 2025-03-27 RX ADMIN — INSULIN LISPRO 1: 100 INJECTION, SOLUTION INTRAVENOUS; SUBCUTANEOUS at 08:41

## 2025-03-27 NOTE — PROGRESS NOTE ADULT - SUBJECTIVE AND OBJECTIVE BOX
Neurology Progress Note    Interval History:    Patient has no new complaints. No overnight events.    Medications:  acetaminophen     Tablet .. 650 milliGRAM(s) Oral every 6 hours PRN  aspirin  chewable 81 milliGRAM(s) Oral daily  atorvastatin 80 milliGRAM(s) Oral at bedtime  buPROPion XL (24-Hour) . 150 milliGRAM(s) Oral daily  chlorhexidine 2% Cloths 1 Application(s) Topical <User Schedule>  dextrose 5%. 1000 milliLiter(s) IV Continuous <Continuous>  dextrose 5%. 1000 milliLiter(s) IV Continuous <Continuous>  dextrose 50% Injectable 25 Gram(s) IV Push once  dextrose 50% Injectable 12.5 Gram(s) IV Push once  dextrose 50% Injectable 25 Gram(s) IV Push once  dextrose Oral Gel 15 Gram(s) Oral once PRN  DULoxetine 60 milliGRAM(s) Oral daily  enoxaparin Injectable 40 milliGRAM(s) SubCutaneous every 24 hours  finasteride 5 milliGRAM(s) Oral at bedtime  gabapentin 100 milliGRAM(s) Oral three times a day  glucagon  Injectable 1 milliGRAM(s) IntraMuscular once  insulin glargine Injectable (LANTUS) 25 Unit(s) SubCutaneous every morning  insulin lispro (ADMELOG) corrective regimen sliding scale   SubCutaneous three times a day before meals  insulin lispro (ADMELOG) corrective regimen sliding scale   SubCutaneous at bedtime  levothyroxine 150 MICROGram(s) Oral daily  ticagrelor 90 milliGRAM(s) Oral two times a day      Vital Signs Last 24 Hrs  T(C): 36.7 (27 Mar 2025 04:37), Max: 36.8 (26 Mar 2025 20:46)  T(F): 98 (27 Mar 2025 04:37), Max: 98.2 (26 Mar 2025 20:46)  HR: 82 (27 Mar 2025 04:37) (72 - 82)  BP: 135/72 (27 Mar 2025 04:37) (130/81 - 138/75)  BP(mean): 93 (27 Mar 2025 04:37) (93 - 96)  RR: 18 (27 Mar 2025 04:37) (18 - 18)  SpO2: 98% (27 Mar 2025 04:37) (98% - 99%)    Parameters below as of 27 Mar 2025 04:37  Patient On (Oxygen Delivery Method): nasal cannula      Neurological Examination:  General:  Appearance is consistent with chronologic age.  No abnormal facies.  Gross skin survey within normal limits.    Cognitive/Language: The patient is oriented to person, place, time and date. Language with normal repetition, comprehension and naming. Nondysarthria  Eyes: VFF. EOMI w/o nystagmus or reported double vision. PERRL. No ptosis/weakness of eyelid closure.    Face: Facial sensation normal V1 - 3, no facial asymmetry.  Ears/Nose/Throat: Hearing grossly intact b/l. Palate elevates midline. Tongue and uvula midline.   Motor examination: Normal tone. No tremors or involuntary movements.  Strength Exam (MRC scale): 4+/5 RUE strength, 5/5 with rest of extremities.  Reflexes: 2+ b/l biceps and triceps reflexes, 1+ b/l brachioradialis, patella and Achilles reflexes. Plantar response downgoing b/l.  Sensory examination: Intact to light touch bilaterally, no extinction  Cerebellum: b/l FTN intact   NIHSS 0    Labs:  CBC Full  -  ( 27 Mar 2025 06:02 )  WBC Count : 2.81 K/uL  RBC Count : 2.33 M/uL  Hemoglobin : 8.7 g/dL  Hematocrit : 25.6 %  Platelet Count - Automated : 181 K/uL  Mean Cell Volume : 109.9 fL  Mean Cell Hemoglobin : 37.3 pg  Mean Cell Hemoglobin Concentration : 34.0 g/dL  Auto Neutrophil # : x  Auto Lymphocyte # : x  Auto Monocyte # : x  Auto Eosinophil # : x  Auto Basophil # : x  Auto Neutrophil % : x  Auto Lymphocyte % : x  Auto Monocyte % : x  Auto Eosinophil % : x  Auto Basophil % : x    03-26    140  |  107  |  49[H]  ----------------------------<  176[H]  4.5   |  22  |  1.7[H]    Ca    9.3      26 Mar 2025 05:53  Phos  4.1     03-26  Mg     1.6     03-26    TPro  5.5[L]  /  Alb  3.7  /  TBili  0.4  /  DBili  x   /  AST  32  /  ALT  21  /  AlkPhos  170[H]  03-26    LIVER FUNCTIONS - ( 26 Mar 2025 05:53 )  Alb: 3.7 g/dL / Pro: 5.5 g/dL / ALK PHOS: 170 U/L / ALT: 21 U/L / AST: 32 U/L / GGT: x             Urinalysis Basic - ( 26 Mar 2025 05:53 )    Color: x / Appearance: x / SG: x / pH: x  Gluc: 176 mg/dL / Ketone: x  / Bili: x / Urobili: x   Blood: x / Protein: x / Nitrite: x   Leuk Esterase: x / RBC: x / WBC x   Sq Epi: x / Non Sq Epi: x / Bacteria: x

## 2025-03-27 NOTE — GOALS OF CARE CONVERSATION - ADVANCED CARE PLANNING - CONVERSATION DETAILS
Discussed with patient on code status. Patient would like to stay in full code at this time. She states she would like to receive the full treatment if the causes of her condition are reversible and treatable.

## 2025-03-27 NOTE — PROGRESS NOTE ADULT - ASSESSMENT
75 yo woman PMH of aortic stenosis, hypertension, diabetes, CORTEZ, hypothyroidism, former smoker (quit 1996; still occasionally vapes medical marijuana) presenting with 1 day of R hand and leg weakness plus dysarthria, CTH with hypodensity in the right caudate head with small calcifications faintly evident on the last CT, CTA with near occlusion of left M1 MCA with moderate focal stenosis of the A2 segment of the right DIVINE, MRH confirms stroke subcortical L MCA territory suspect cardioembolic. Patient already taking plavix daily at baseline per primary care provider. PRU 3/21 234 suggesting plavix nonresponder, Plavix switched to Brilinta.    #Subcortical L MCA territory suspect cardioembolic  #HTN  - s/p DAPT load  - antiplatelets/anticoagulants: Aspirin 81mg daily, Brilinta 90mg twice daily   - antilipemics: atorvastatin 80mg nightly inpatient  - SBP goal: 120 - 160  - q8h neuro and vitals checks  - holding home lisinopril- hydrochlorothiazide 20/25 mg twice daily  - c/w nifedipine 30 mg daily  - s/p loop recorder placement 3/24    #CKD baseline 2.3-3.4  - Cr improved from previous  - s/p 75c LR 24 hours, stopped as per hospitalist recs    #CORTEZ  - follow up pulm outpatient for better fitting CPAP  - supplemental O2 at night    #Hypothyroid  - c/w home Synthroid 150mcg daily     #DM w/ neuropathy  - A1c 7.9  - holding home Humalog 75/25 ~45U twice daily   - hospitalist advising converting to insulin NPH 30U twice daily > will start on 15U twice daily and increase as tolerated/required  - insulin corrective sliding scale     #sciatica  #diabetic neuropathy  - gabapentin 400mg twice daily (home dose) > converted to 100mg three times daily per [facility]  - duloxetine 60mg daily     #hair loss  - finasteride 5mg daily 5mg nightly (home dose)     #Depression/Anxiety  - Bupropion SR 150mg daily    --------------------------------------------------  #Prophylactic measures  - DVT prophylaxis: lovenox / SCDs  - GI prophylaxis: not indicated at this time  - Diet: DASH/TLC / consistent carbohydrate    #Dispo: [facility] pending approval

## 2025-03-27 NOTE — PROGRESS NOTE ADULT - SUBJECTIVE AND OBJECTIVE BOX
Patient is a 74y old  Female who presents with a chief complaint of stroke (25 Mar 2025 07:16)      Patient seen and examined at bedside. No acute overnight events. No acute complaints this morning. ROS otherwise negative on a 10-point assessment.     ALLERGIES:  clindamycin (Rash)    MEDICATIONS  (STANDING):  aspirin  chewable 81 milliGRAM(s) Oral daily  atorvastatin 80 milliGRAM(s) Oral at bedtime  buPROPion XL (24-Hour) . 150 milliGRAM(s) Oral daily  chlorhexidine 2% Cloths 1 Application(s) Topical <User Schedule>  dextrose 5%. 1000 milliLiter(s) (50 mL/Hr) IV Continuous <Continuous>  dextrose 5%. 1000 milliLiter(s) (100 mL/Hr) IV Continuous <Continuous>  dextrose 50% Injectable 25 Gram(s) IV Push once  dextrose 50% Injectable 12.5 Gram(s) IV Push once  dextrose 50% Injectable 25 Gram(s) IV Push once  DULoxetine 60 milliGRAM(s) Oral daily  enoxaparin Injectable 40 milliGRAM(s) SubCutaneous every 24 hours  finasteride 5 milliGRAM(s) Oral at bedtime  gabapentin 100 milliGRAM(s) Oral three times a day  glucagon  Injectable 1 milliGRAM(s) IntraMuscular once  insulin glargine Injectable (LANTUS) 25 Unit(s) SubCutaneous every morning  insulin lispro (ADMELOG) corrective regimen sliding scale   SubCutaneous three times a day before meals  insulin lispro (ADMELOG) corrective regimen sliding scale   SubCutaneous at bedtime  levothyroxine 150 MICROGram(s) Oral daily  ticagrelor 90 milliGRAM(s) Oral two times a day    MEDICATIONS  (PRN):  acetaminophen     Tablet .. 650 milliGRAM(s) Oral every 6 hours PRN Temp greater or equal to 38C (100.4F), Mild Pain (1 - 3)  dextrose Oral Gel 15 Gram(s) Oral once PRN Blood Glucose LESS THAN 70 milliGRAM(s)/deciliter      Vital Signs Last 24 Hrs  T(C): 36.7 (27 Mar 2025 04:37), Max: 36.8 (26 Mar 2025 20:46)  T(F): 98 (27 Mar 2025 04:37), Max: 98.2 (26 Mar 2025 20:46)  HR: 82 (27 Mar 2025 04:37) (72 - 82)  BP: 135/72 (27 Mar 2025 04:37) (130/81 - 138/75)  BP(mean): 93 (27 Mar 2025 04:37) (93 - 96)  RR: 18 (27 Mar 2025 04:37) (18 - 18)  SpO2: 98% (27 Mar 2025 04:37) (98% - 99%)    Parameters below as of 27 Mar 2025 04:37  Patient On (Oxygen Delivery Method): nasal cannula        PHYSICAL EXAM:  General: NAD, A/O x 3  ENT: MMM  Neck: Supple, No JVD  Lungs: Clear to auscultation bilaterally  Cardio: RRR, S1/S2, No murmurs  Abdomen: Soft, Nontender, Nondistended; Bowel sounds present  Extremities: No cyanosis, No edema    LABS:                                   8.7    2.81  )-----------( 181      ( 27 Mar 2025 06:02 )             25.6     03-27    143  |  109  |  48  ----------------------------<  144  4.7   |  22  |  1.7    Ca    9.6      27 Mar 2025 06:02  Phos  3.9     03-27  Mg     1.6     03-27    TPro  5.7  /  Alb  3.7  /  TBili  0.4  /  DBili  x   /  AST  31  /  ALT  22  /  AlkPhos  169  03-27    eGFR: 31 mL/min/1.73m2 (27 Mar 2025 06:02)          03-21 Chol 152 mg/dL LDL -- HDL 50 mg/dL Trig 139 mg/dL      CAPILLARY BLOOD GLUCOSE      POCT Blood Glucose.: 187 mg/dL (26 Mar 2025 16:50)  POCT Blood Glucose.: 180 mg/dL (26 Mar 2025 11:45)  POCT Blood Glucose.: 202 mg/dL (26 Mar 2025 08:02)  POCT Blood Glucose.: 247 mg/dL (25 Mar 2025 21:07)  POCT Blood Glucose.: 184 mg/dL (25 Mar 2025 17:54)        Urinalysis Basic - ( 25 Mar 2025 06:36 )    Color: x / Appearance: x / SG: x / pH: x  Gluc: 171 mg/dL / Ketone: x  / Bili: x / Urobili: x   Blood: x / Protein: x / Nitrite: x   Leuk Esterase: x / RBC: x / WBC x   Sq Epi: x / Non Sq Epi: x / Bacteria: x            RADIOLOGY & ADDITIONAL TESTS:    Care Discussed with Consultants/Other Providers:

## 2025-03-28 VITALS
DIASTOLIC BLOOD PRESSURE: 75 MMHG | SYSTOLIC BLOOD PRESSURE: 133 MMHG | RESPIRATION RATE: 18 BRPM | TEMPERATURE: 98 F | OXYGEN SATURATION: 98 % | HEART RATE: 83 BPM

## 2025-03-28 LAB
ALBUMIN SERPL ELPH-MCNC: 3.8 G/DL — SIGNIFICANT CHANGE UP (ref 3.5–5.2)
ALP SERPL-CCNC: 174 U/L — HIGH (ref 30–115)
ALT FLD-CCNC: 23 U/L — SIGNIFICANT CHANGE UP (ref 0–41)
ANION GAP SERPL CALC-SCNC: 12 MMOL/L — SIGNIFICANT CHANGE UP (ref 7–14)
AST SERPL-CCNC: 30 U/L — SIGNIFICANT CHANGE UP (ref 0–41)
BASOPHILS # BLD AUTO: 0.01 K/UL — SIGNIFICANT CHANGE UP (ref 0–0.2)
BASOPHILS NFR BLD AUTO: 0.3 % — SIGNIFICANT CHANGE UP (ref 0–1)
BILIRUB SERPL-MCNC: 0.5 MG/DL — SIGNIFICANT CHANGE UP (ref 0.2–1.2)
BUN SERPL-MCNC: 48 MG/DL — HIGH (ref 10–20)
CALCIUM SERPL-MCNC: 9.7 MG/DL — SIGNIFICANT CHANGE UP (ref 8.4–10.5)
CHLORIDE SERPL-SCNC: 108 MMOL/L — SIGNIFICANT CHANGE UP (ref 98–110)
CO2 SERPL-SCNC: 21 MMOL/L — SIGNIFICANT CHANGE UP (ref 17–32)
CREAT SERPL-MCNC: 1.8 MG/DL — HIGH (ref 0.7–1.5)
EGFR: 29 ML/MIN/1.73M2 — LOW
EGFR: 29 ML/MIN/1.73M2 — LOW
EOSINOPHIL # BLD AUTO: 0.08 K/UL — SIGNIFICANT CHANGE UP (ref 0–0.7)
EOSINOPHIL NFR BLD AUTO: 2.6 % — SIGNIFICANT CHANGE UP (ref 0–8)
GLUCOSE BLDC GLUCOMTR-MCNC: 191 MG/DL — HIGH (ref 70–99)
GLUCOSE BLDC GLUCOMTR-MCNC: 253 MG/DL — HIGH (ref 70–99)
GLUCOSE SERPL-MCNC: 147 MG/DL — HIGH (ref 70–99)
HCT VFR BLD CALC: 27.3 % — LOW (ref 37–47)
HGB BLD-MCNC: 9.6 G/DL — LOW (ref 12–16)
IMM GRANULOCYTES NFR BLD AUTO: 0.7 % — HIGH (ref 0.1–0.3)
LYMPHOCYTES # BLD AUTO: 0.87 K/UL — LOW (ref 1.2–3.4)
LYMPHOCYTES # BLD AUTO: 28.7 % — SIGNIFICANT CHANGE UP (ref 20.5–51.1)
MAGNESIUM SERPL-MCNC: 1.7 MG/DL — LOW (ref 1.8–2.4)
MCHC RBC-ENTMCNC: 35.2 G/DL — SIGNIFICANT CHANGE UP (ref 32–37)
MCHC RBC-ENTMCNC: 38.4 PG — HIGH (ref 27–31)
MCV RBC AUTO: 109.2 FL — HIGH (ref 81–99)
MONOCYTES # BLD AUTO: 0.55 K/UL — SIGNIFICANT CHANGE UP (ref 0.1–0.6)
MONOCYTES NFR BLD AUTO: 18.2 % — HIGH (ref 1.7–9.3)
NEUTROPHILS # BLD AUTO: 1.5 K/UL — SIGNIFICANT CHANGE UP (ref 1.4–6.5)
NEUTROPHILS NFR BLD AUTO: 49.5 % — SIGNIFICANT CHANGE UP (ref 42.2–75.2)
NRBC BLD AUTO-RTO: 0 /100 WBCS — SIGNIFICANT CHANGE UP (ref 0–0)
PHOSPHATE SERPL-MCNC: 4 MG/DL — SIGNIFICANT CHANGE UP (ref 2.1–4.9)
PLATELET # BLD AUTO: 193 K/UL — SIGNIFICANT CHANGE UP (ref 130–400)
PMV BLD: 9 FL — SIGNIFICANT CHANGE UP (ref 7.4–10.4)
POTASSIUM SERPL-MCNC: 4.6 MMOL/L — SIGNIFICANT CHANGE UP (ref 3.5–5)
POTASSIUM SERPL-SCNC: 4.6 MMOL/L — SIGNIFICANT CHANGE UP (ref 3.5–5)
PROT SERPL-MCNC: 5.9 G/DL — LOW (ref 6–8)
RBC # BLD: 2.5 M/UL — LOW (ref 4.2–5.4)
RBC # FLD: 14.1 % — SIGNIFICANT CHANGE UP (ref 11.5–14.5)
SODIUM SERPL-SCNC: 141 MMOL/L — SIGNIFICANT CHANGE UP (ref 135–146)
WBC # BLD: 3.03 K/UL — LOW (ref 4.8–10.8)
WBC # FLD AUTO: 3.03 K/UL — LOW (ref 4.8–10.8)

## 2025-03-28 PROCEDURE — 99232 SBSQ HOSP IP/OBS MODERATE 35: CPT

## 2025-03-28 PROCEDURE — 99239 HOSP IP/OBS DSCHRG MGMT >30: CPT

## 2025-03-28 RX ORDER — ASPIRIN 325 MG
1 TABLET ORAL
Qty: 30 | Refills: 0
Start: 2025-03-28 | End: 2025-04-26

## 2025-03-28 RX ORDER — ROSUVASTATIN CALCIUM 5 MG/1
1 TABLET, FILM COATED ORAL
Qty: 30 | Refills: 0
Start: 2025-03-28 | End: 2025-04-26

## 2025-03-28 RX ORDER — TICAGRELOR 90 MG/1
1 TABLET ORAL
Qty: 60 | Refills: 0
Start: 2025-03-28 | End: 2025-04-26

## 2025-03-28 RX ORDER — ATORVASTATIN CALCIUM 80 MG/1
1 TABLET, FILM COATED ORAL
Qty: 30 | Refills: 0
Start: 2025-03-28 | End: 2025-04-26

## 2025-03-28 RX ADMIN — GABAPENTIN 100 MILLIGRAM(S): 400 CAPSULE ORAL at 13:14

## 2025-03-28 RX ADMIN — BUPROPION HYDROBROMIDE 150 MILLIGRAM(S): 522 TABLET, EXTENDED RELEASE ORAL at 11:41

## 2025-03-28 RX ADMIN — DULOXETINE 60 MILLIGRAM(S): 20 CAPSULE, DELAYED RELEASE ORAL at 11:41

## 2025-03-28 RX ADMIN — INSULIN LISPRO 3: 100 INJECTION, SOLUTION INTRAVENOUS; SUBCUTANEOUS at 11:45

## 2025-03-28 RX ADMIN — TICAGRELOR 90 MILLIGRAM(S): 90 TABLET ORAL at 05:11

## 2025-03-28 RX ADMIN — Medication 1 APPLICATION(S): at 05:23

## 2025-03-28 RX ADMIN — Medication 81 MILLIGRAM(S): at 11:41

## 2025-03-28 RX ADMIN — Medication 40 MILLIGRAM(S): at 06:12

## 2025-03-28 RX ADMIN — INSULIN GLARGINE-YFGN 25 UNIT(S): 100 INJECTION, SOLUTION SUBCUTANEOUS at 08:30

## 2025-03-28 RX ADMIN — INSULIN LISPRO 1: 100 INJECTION, SOLUTION INTRAVENOUS; SUBCUTANEOUS at 08:29

## 2025-03-28 RX ADMIN — Medication 150 MICROGRAM(S): at 05:12

## 2025-03-28 RX ADMIN — GABAPENTIN 100 MILLIGRAM(S): 400 CAPSULE ORAL at 05:11

## 2025-03-28 NOTE — PROGRESS NOTE ADULT - REASON FOR ADMISSION
stroke

## 2025-03-28 NOTE — PROGRESS NOTE ADULT - SUBJECTIVE AND OBJECTIVE BOX
Neurology Progress Note    Interval History:    Patient has no new complaints. No overnight events.    Medications:  acetaminophen     Tablet .. 650 milliGRAM(s) Oral every 6 hours PRN  aspirin  chewable 81 milliGRAM(s) Oral daily  atorvastatin 80 milliGRAM(s) Oral at bedtime  buPROPion XL (24-Hour) . 150 milliGRAM(s) Oral daily  chlorhexidine 2% Cloths 1 Application(s) Topical <User Schedule>  dextrose 5%. 1000 milliLiter(s) IV Continuous <Continuous>  dextrose 5%. 1000 milliLiter(s) IV Continuous <Continuous>  dextrose 50% Injectable 25 Gram(s) IV Push once  dextrose 50% Injectable 12.5 Gram(s) IV Push once  dextrose 50% Injectable 25 Gram(s) IV Push once  dextrose Oral Gel 15 Gram(s) Oral once PRN  DULoxetine 60 milliGRAM(s) Oral daily  enoxaparin Injectable 40 milliGRAM(s) SubCutaneous every 24 hours  finasteride 5 milliGRAM(s) Oral at bedtime  gabapentin 100 milliGRAM(s) Oral three times a day  glucagon  Injectable 1 milliGRAM(s) IntraMuscular once  insulin glargine Injectable (LANTUS) 25 Unit(s) SubCutaneous every morning  insulin lispro (ADMELOG) corrective regimen sliding scale   SubCutaneous three times a day before meals  insulin lispro (ADMELOG) corrective regimen sliding scale   SubCutaneous at bedtime  levothyroxine 150 MICROGram(s) Oral daily  pantoprazole    Tablet 40 milliGRAM(s) Oral before breakfast  ticagrelor 90 milliGRAM(s) Oral two times a day    Vital Signs Last 24 Hrs  T(C): 36.1 (28 Mar 2025 04:56), Max: 36.6 (27 Mar 2025 20:32)  T(F): 97 (28 Mar 2025 04:56), Max: 97.8 (27 Mar 2025 20:32)  HR: 75 (28 Mar 2025 04:56) (75 - 82)  BP: 109/65 (28 Mar 2025 04:56) (109/65 - 148/77)  BP(mean): 79 (28 Mar 2025 04:56) (79 - 101)  RR: 18 (28 Mar 2025 04:56) (18 - 18)  SpO2: 99% (28 Mar 2025 04:56) (98% - 99%)    Parameters below as of 28 Mar 2025 04:56  Patient On (Oxygen Delivery Method): nasal cannula  O2 Flow (L/min): 2    Neurological Examination:  General:  Appearance is consistent with chronologic age.  No abnormal facies.  Gross skin survey within normal limits.    Cognitive/Language: The patient is oriented to person, place, time and date. Language with normal repetition, comprehension and naming. Nondysarthria  Eyes: VFF. EOMI w/o nystagmus or reported double vision. PERRL. No ptosis/weakness of eyelid closure.    Face: Facial sensation normal V1 - 3, no facial asymmetry.  Ears/Nose/Throat: Hearing grossly intact b/l. Palate elevates midline. Tongue and uvula midline.   Motor examination: Normal tone. No tremors or involuntary movements.  Strength Exam (MRC scale): 4+/5 RUE strength, 5/5 with rest of extremities.  Reflexes: 2+ b/l biceps and triceps reflexes, 1+ b/l brachioradialis, patella and Achilles reflexes. Plantar response downgoing b/l.  Sensory examination: Intact to light touch bilaterally, no extinction  Cerebellum: b/l FTN intact  NIHSS 0    Labs:  CBC Full  -  ( 28 Mar 2025 05:33 )  WBC Count : 3.03 K/uL  RBC Count : 2.50 M/uL  Hemoglobin : 9.6 g/dL  Hematocrit : 27.3 %  Platelet Count - Automated : 193 K/uL  Mean Cell Volume : 109.2 fL  Mean Cell Hemoglobin : 38.4 pg  Mean Cell Hemoglobin Concentration : 35.2 g/dL  Auto Neutrophil # : x  Auto Lymphocyte # : x  Auto Monocyte # : x  Auto Eosinophil # : x  Auto Basophil # : x  Auto Neutrophil % : x  Auto Lymphocyte % : x  Auto Monocyte % : x  Auto Eosinophil % : x  Auto Basophil % : x    03-28    141  |  108  |  48[H]  ----------------------------<  147[H]  4.6   |  21  |  1.8[H]    Ca    9.7      28 Mar 2025 05:33  Phos  4.0     03-28  Mg     1.7     03-28    TPro  5.9[L]  /  Alb  3.8  /  TBili  0.5  /  DBili  x   /  AST  30  /  ALT  23  /  AlkPhos  174[H]  03-28    LIVER FUNCTIONS - ( 28 Mar 2025 05:33 )  Alb: 3.8 g/dL / Pro: 5.9 g/dL / ALK PHOS: 174 U/L / ALT: 23 U/L / AST: 30 U/L / GGT: x             Urinalysis Basic - ( 28 Mar 2025 05:33 )    Color: x / Appearance: x / SG: x / pH: x  Gluc: 147 mg/dL / Ketone: x  / Bili: x / Urobili: x   Blood: x / Protein: x / Nitrite: x   Leuk Esterase: x / RBC: x / WBC x   Sq Epi: x / Non Sq Epi: x / Bacteria: x

## 2025-03-28 NOTE — PROGRESS NOTE ADULT - ASSESSMENT
Addended by: MIRELLA MARTINEZ on: 1/31/2022 09:00 AM     Modules accepted: Orders     73yo woman PMH of aortic stenosis, hypertension, diabetes, CORTEZ, hypothyroidism, former smoker (quit 1996; still occasionally vapes medical marijuana) presenting with 1 day of R hand and leg weakness plus dysarthria, CTH with hypodensity in the right caudate head with small calcifications faintly evident on the last CT, CTA with near occlusion of left M1 MCA with moderate focal stenosis of the A2 segment of the right DIVINE, MRH confirms stroke subcortical L MCA territory suspect cardioembolic. Patient already taking plavix daily at baseline per primary care provider. PRU 3/21 234 suggesting plavix nonresponder.    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   -EP loop placed   -SBP goal: 120-160, allow permissive HTN.     #Macrocytic anemia  #Leukopenia   -pt had colonoscopy within the last 6months  - recommend complete anemia panel (B12/folate/iron/TIBC/ferritin)      #BARBER on CKD stage 3b (improved)  -creatinine 1.8  - monitor renal fxn and electrolytes daily   - renally dose medications  - avoid nephrotoxic medications   - outpt Nephro f/u     #fluid overload (improved)  - monitor I/O's while inpt   -pt doing well on ra     #Hypothyroid   Hypothyroid Synthroid 150mcg daily     #DM w/ neuropathy  -A1c 7.9  -holding home Humalog 75/25 twice daily   - c/w Lantus 25 units qhs (currently qam)  - FG TIDAC/HS  - ISS for glucose >200 (target 110-180 while inpatient)  - Hypoglycemic protocol PRN  - Calculate 24H insulin requirement daily, if indicated     #sciatica  #diabetic neuropathy  gabapentin 400mg twice daily (home dose)  duloxetine 60mg daily     #Depression/Anxiety  Bupropion SR 150mg daily > interchange to bupropion XL 150mg daily     #CORTEZ  follow up pulm outpatient for better fitting CPAP  - nocturnal CPAP and PRN   - Supplemental oxygen PRN to maintain PO >92%    #Hypomagnesemia   - give Mag sulfate 2g IV x1    Management per Neurology, Medicine for co-management.      Total time spent to complete patient's bedside assessment, reviewed medical chart, discussed medical plan of care with team was more than 35 minutes with >50% of time spent face to face with patient, discussion with patient/family and/or coordination of care.

## 2025-03-28 NOTE — PROGRESS NOTE ADULT - SUBJECTIVE AND OBJECTIVE BOX
Patient is a 74y old  Female who presents with a chief complaint of stroke (25 Mar 2025 07:16)      Patient seen and examined at bedside. No acute overnight events. No acute complaints this morning. ROS otherwise negative on a 10-point assessment.     ALLERGIES:  clindamycin (Rash)    MEDICATIONS  (STANDING):  aspirin  chewable 81 milliGRAM(s) Oral daily  atorvastatin 80 milliGRAM(s) Oral at bedtime  buPROPion XL (24-Hour) . 150 milliGRAM(s) Oral daily  chlorhexidine 2% Cloths 1 Application(s) Topical <User Schedule>  dextrose 5%. 1000 milliLiter(s) (50 mL/Hr) IV Continuous <Continuous>  dextrose 5%. 1000 milliLiter(s) (100 mL/Hr) IV Continuous <Continuous>  dextrose 50% Injectable 25 Gram(s) IV Push once  dextrose 50% Injectable 12.5 Gram(s) IV Push once  dextrose 50% Injectable 25 Gram(s) IV Push once  DULoxetine 60 milliGRAM(s) Oral daily  enoxaparin Injectable 40 milliGRAM(s) SubCutaneous every 24 hours  finasteride 5 milliGRAM(s) Oral at bedtime  gabapentin 100 milliGRAM(s) Oral three times a day  glucagon  Injectable 1 milliGRAM(s) IntraMuscular once  insulin glargine Injectable (LANTUS) 25 Unit(s) SubCutaneous every morning  insulin lispro (ADMELOG) corrective regimen sliding scale   SubCutaneous three times a day before meals  insulin lispro (ADMELOG) corrective regimen sliding scale   SubCutaneous at bedtime  levothyroxine 150 MICROGram(s) Oral daily  ticagrelor 90 milliGRAM(s) Oral two times a day    MEDICATIONS  (PRN):  acetaminophen     Tablet .. 650 milliGRAM(s) Oral every 6 hours PRN Temp greater or equal to 38C (100.4F), Mild Pain (1 - 3)  dextrose Oral Gel 15 Gram(s) Oral once PRN Blood Glucose LESS THAN 70 milliGRAM(s)/deciliter      Vital Signs Last 24 Hrs  T(C): 36.1 (28 Mar 2025 04:56), Max: 36.6 (27 Mar 2025 20:32)  T(F): 97 (28 Mar 2025 04:56), Max: 97.8 (27 Mar 2025 20:32)  HR: 75 (28 Mar 2025 04:56) (75 - 82)  BP: 109/65 (28 Mar 2025 04:56) (109/65 - 148/77)  BP(mean): 79 (28 Mar 2025 04:56) (79 - 101)  RR: 18 (28 Mar 2025 04:56) (18 - 18)  SpO2: 99% (28 Mar 2025 04:56) (98% - 99%)    Parameters below as of 28 Mar 2025 04:56  Patient On (Oxygen Delivery Method): nasal cannula  O2 Flow (L/min): 2      PHYSICAL EXAM:  General: NAD, A/O x 3  ENT: MMM  Neck: Supple, No JVD  Lungs: Clear to auscultation bilaterally  Cardio: RRR, S1/S2, No murmurs  Abdomen: Soft, Nontender, Nondistended; Bowel sounds present  Extremities: No cyanosis, No edema    LABS:                       9.6    3.03  )-----------( 193      ( 28 Mar 2025 05:33 )             27.3     03-28    141  |  108  |  48  ----------------------------<  147  4.6   |  21  |  1.8    Ca    9.7      28 Mar 2025 05:33  Phos  4.0     03-28  Mg     1.7     03-28    TPro  5.9  /  Alb  3.8  /  TBili  0.5  /  DBili  x   /  AST  30  /  ALT  23  /  AlkPhos  174  03-28    eGFR: 29 mL/min/1.73m2 (28 Mar 2025 05:33)      03-21 Chol 152 mg/dL LDL -- HDL 50 mg/dL Trig 139 mg/dL      CAPILLARY BLOOD GLUCOSE      POCT Blood Glucose.: 191 mg/dL (28 Mar 2025 08:11)  POCT Blood Glucose.: 195 mg/dL (27 Mar 2025 21:37)  POCT Blood Glucose.: 228 mg/dL (27 Mar 2025 21:01)  POCT Blood Glucose.: 248 mg/dL (27 Mar 2025 16:52)  POCT Blood Glucose.: 193 mg/dL (27 Mar 2025 11:25)        Urinalysis Basic - ( 25 Mar 2025 06:36 )    Color: x / Appearance: x / SG: x / pH: x  Gluc: 171 mg/dL / Ketone: x  / Bili: x / Urobili: x   Blood: x / Protein: x / Nitrite: x   Leuk Esterase: x / RBC: x / WBC x   Sq Epi: x / Non Sq Epi: x / Bacteria: x            RADIOLOGY & ADDITIONAL TESTS:    Care Discussed with Consultants/Other Providers:

## 2025-03-28 NOTE — PROGRESS NOTE ADULT - ATTENDING COMMENTS
Patient seen and examined and agree with above except as noted.  Patients history, notes ,labs, imaging, vitals and meds reviewed personally.    Plan as above
Patient seen and examined and agree with above except as noted.  Patients history, notes ,labs, imaging, vitals and meds reviewed personally.    Plan as above
Patient seen and examined and agree with above except as noted.  Patients history, notes ,labs, imaging, vitals and meds reviewed personally.  No new complaints    Plan as above
stable  waiting for loop recorder
Pt is a 73 yo F with PMHxnof HTN, HLD, DM, CORTEZ (noncompliant with CPAP), remote smoker, aortic stenosis, who presented with right hand weakness and dysarthria. Improved today, NIHSS 2. CT head without acute process. CTA head/neck with focal critical left M1 stenosis,    Impr: left lentiform nucleus/corona radiata small scattered strokes  Given overall appearance of L MCA, favor underlying embolus rather than atherosclerotic plaque  PTA: ASA/plavix. . D/c'ed plavix and started brilinta 90mg BID  Discussed importance of CORTEZ management  S/p ILR placement  F/u outpt for MR PATRICIA  D/c to [facility], f/u in stroke clinic
please refer to attending attestation on H&P
as above  pending loop recorder and PT at home

## 2025-03-28 NOTE — PROGRESS NOTE ADULT - PROVIDER SPECIALTY LIST ADULT
Electrophysiology
Hospitalist
Neurology
Hospitalist
Neurology
Hospitalist
Hospitalist
Neurology
Hospitalist
Hospitalist
Neurology

## 2025-03-28 NOTE — PROGRESS NOTE ADULT - ASSESSMENT
73 yo woman PMH of aortic stenosis, hypertension, diabetes, CORTEZ, hypothyroidism, former smoker (quit 1996; still occasionally vapes medical marijuana) presenting with 1 day of R hand and leg weakness plus dysarthria, CTH with hypodensity in the right caudate head with small calcifications faintly evident on the last CT, CTA with near occlusion of left M1 MCA with moderate focal stenosis of the A2 segment of the right DIVINE, MRH confirms stroke subcortical L MCA territory suspect cardioembolic. Patient already taking plavix daily at baseline per primary care provider. PRU 3/21 234 suggesting plavix nonresponder, Plavix switched to Brilinta.    #Subcortical L MCA territory suspect cardioembolic  #HTN  - s/p DAPT load  - antiplatelets/anticoagulants: Aspirin 81mg daily, Brilinta 90mg twice daily   - antilipemics: atorvastatin 80mg nightly inpatient  - SBP goal: 120 - 160  - q8h neuro and vitals checks  - holding home lisinopril- hydrochlorothiazide 20/25 mg twice daily  - c/w nifedipine 30 mg daily  - s/p loop recorder placement 3/24    #CKD baseline 2.3-3.4  - Cr improved from previous  - s/p 75c LR 24 hours, stopped as per hospitalist recs    #CORTEZ  - follow up pulm outpatient for better fitting CPAP  - supplemental O2 at night    #Hypothyroid  - c/w home Synthroid 150mcg daily     #DM w/ neuropathy  - A1c 7.9  - holding home Humalog 75/25 ~45U twice daily   - hospitalist advising converting to insulin NPH 30U twice daily > will start on 15U twice daily and increase as tolerated/required  - insulin corrective sliding scale     #sciatica  #diabetic neuropathy  - gabapentin 400mg twice daily (home dose) > converted to 100mg three times daily per [facility]  - duloxetine 60mg daily     #hair loss  - finasteride 5mg daily 5mg nightly (home dose)     #Depression/Anxiety  - Bupropion SR 150mg daily    --------------------------------------------------  #Prophylactic measures  - DVT prophylaxis: lovenox / SCDs  - GI prophylaxis: not indicated at this time  - Diet: DASH/TLC / consistent carbohydrate    #Dispo: [facility]

## 2025-04-04 DIAGNOSIS — R47.1 DYSARTHRIA AND ANARTHRIA: ICD-10-CM

## 2025-04-04 DIAGNOSIS — E03.9 HYPOTHYROIDISM, UNSPECIFIED: ICD-10-CM

## 2025-04-04 DIAGNOSIS — Z88.1 ALLERGY STATUS TO OTHER ANTIBIOTIC AGENTS: ICD-10-CM

## 2025-04-04 DIAGNOSIS — Z91.199 PATIENT'S NONCOMPLIANCE WITH OTHER MEDICAL TREATMENT AND REGIMEN DUE TO UNSPECIFIED REASON: ICD-10-CM

## 2025-04-04 DIAGNOSIS — E11.22 TYPE 2 DIABETES MELLITUS WITH DIABETIC CHRONIC KIDNEY DISEASE: ICD-10-CM

## 2025-04-04 DIAGNOSIS — E11.40 TYPE 2 DIABETES MELLITUS WITH DIABETIC NEUROPATHY, UNSPECIFIED: ICD-10-CM

## 2025-04-04 DIAGNOSIS — I12.9 HYPERTENSIVE CHRONIC KIDNEY DISEASE WITH STAGE 1 THROUGH STAGE 4 CHRONIC KIDNEY DISEASE, OR UNSPECIFIED CHRONIC KIDNEY DISEASE: ICD-10-CM

## 2025-04-04 DIAGNOSIS — N17.9 ACUTE KIDNEY FAILURE, UNSPECIFIED: ICD-10-CM

## 2025-04-04 DIAGNOSIS — R29.810 FACIAL WEAKNESS: ICD-10-CM

## 2025-04-04 DIAGNOSIS — F32.A DEPRESSION, UNSPECIFIED: ICD-10-CM

## 2025-04-04 DIAGNOSIS — L65.9 NONSCARRING HAIR LOSS, UNSPECIFIED: ICD-10-CM

## 2025-04-04 DIAGNOSIS — Z87.891 PERSONAL HISTORY OF NICOTINE DEPENDENCE: ICD-10-CM

## 2025-04-04 DIAGNOSIS — D72.819 DECREASED WHITE BLOOD CELL COUNT, UNSPECIFIED: ICD-10-CM

## 2025-04-04 DIAGNOSIS — N18.4 CHRONIC KIDNEY DISEASE, STAGE 4 (SEVERE): ICD-10-CM

## 2025-04-04 DIAGNOSIS — E83.42 HYPOMAGNESEMIA: ICD-10-CM

## 2025-04-04 DIAGNOSIS — R29.701 NIHSS SCORE 1: ICD-10-CM

## 2025-04-04 DIAGNOSIS — M54.30 SCIATICA, UNSPECIFIED SIDE: ICD-10-CM

## 2025-04-04 DIAGNOSIS — I35.0 NONRHEUMATIC AORTIC (VALVE) STENOSIS: ICD-10-CM

## 2025-04-04 DIAGNOSIS — I63.442 CEREBRAL INFARCTION DUE TO EMBOLISM OF LEFT CEREBELLAR ARTERY: ICD-10-CM

## 2025-04-04 DIAGNOSIS — E78.5 HYPERLIPIDEMIA, UNSPECIFIED: ICD-10-CM

## 2025-04-04 DIAGNOSIS — D64.9 ANEMIA, UNSPECIFIED: ICD-10-CM

## 2025-04-04 DIAGNOSIS — G81.91 HEMIPLEGIA, UNSPECIFIED AFFECTING RIGHT DOMINANT SIDE: ICD-10-CM

## 2025-04-04 DIAGNOSIS — G47.33 OBSTRUCTIVE SLEEP APNEA (ADULT) (PEDIATRIC): ICD-10-CM

## 2025-04-11 ENCOUNTER — NON-APPOINTMENT (OUTPATIENT)
Age: 75
End: 2025-04-11

## 2025-04-11 ENCOUNTER — APPOINTMENT (OUTPATIENT)
Dept: NEUROLOGY | Facility: CLINIC | Age: 75
End: 2025-04-11
Payer: MEDICARE

## 2025-04-11 VITALS
HEART RATE: 86 BPM | HEIGHT: 63 IN | DIASTOLIC BLOOD PRESSURE: 75 MMHG | SYSTOLIC BLOOD PRESSURE: 144 MMHG | OXYGEN SATURATION: 99 % | RESPIRATION RATE: 20 BRPM | WEIGHT: 222 LBS | BODY MASS INDEX: 39.34 KG/M2

## 2025-04-11 DIAGNOSIS — I63.9 CEREBRAL INFARCTION, UNSPECIFIED: ICD-10-CM

## 2025-04-11 PROCEDURE — G2211 COMPLEX E/M VISIT ADD ON: CPT

## 2025-04-11 PROCEDURE — 99215 OFFICE O/P EST HI 40 MIN: CPT

## 2025-04-11 RX ORDER — PANTOPRAZOLE 40 MG/1
40 TABLET, DELAYED RELEASE ORAL
Refills: 0 | Status: ACTIVE | COMMUNITY
Start: 2025-04-11

## 2025-04-11 RX ORDER — TICAGRELOR 90 MG/1
90 TABLET ORAL
Refills: 0 | Status: ACTIVE | COMMUNITY

## 2025-04-11 RX ORDER — ASPIRIN 81 MG
81 TABLET, DELAYED RELEASE (ENTERIC COATED) ORAL
Refills: 0 | Status: ACTIVE | COMMUNITY

## 2025-04-22 ENCOUNTER — APPOINTMENT (OUTPATIENT)
Dept: CARDIOLOGY | Facility: CLINIC | Age: 75
End: 2025-04-22
Payer: MEDICARE

## 2025-04-22 VITALS — DIASTOLIC BLOOD PRESSURE: 70 MMHG | SYSTOLIC BLOOD PRESSURE: 110 MMHG

## 2025-04-22 VITALS — BODY MASS INDEX: 39.34 KG/M2 | HEIGHT: 63 IN | WEIGHT: 222 LBS

## 2025-04-22 VITALS — RESPIRATION RATE: 18 BRPM | HEART RATE: 92 BPM

## 2025-04-22 DIAGNOSIS — I34.0 NONRHEUMATIC MITRAL (VALVE) INSUFFICIENCY: ICD-10-CM

## 2025-04-22 DIAGNOSIS — I10 ESSENTIAL (PRIMARY) HYPERTENSION: ICD-10-CM

## 2025-04-22 DIAGNOSIS — E11.9 TYPE 2 DIABETES MELLITUS W/OUT COMPLICATIONS: ICD-10-CM

## 2025-04-22 PROCEDURE — 93000 ELECTROCARDIOGRAM COMPLETE: CPT

## 2025-04-22 PROCEDURE — G2211 COMPLEX E/M VISIT ADD ON: CPT

## 2025-04-22 PROCEDURE — 99214 OFFICE O/P EST MOD 30 MIN: CPT

## 2025-04-24 ENCOUNTER — NON-APPOINTMENT (OUTPATIENT)
Age: 75
End: 2025-04-24

## 2025-04-24 ENCOUNTER — APPOINTMENT (OUTPATIENT)
Dept: ELECTROPHYSIOLOGY | Facility: CLINIC | Age: 75
End: 2025-04-24
Payer: MEDICARE

## 2025-04-24 VITALS
BODY MASS INDEX: 40.12 KG/M2 | SYSTOLIC BLOOD PRESSURE: 124 MMHG | HEIGHT: 62 IN | HEART RATE: 80 BPM | DIASTOLIC BLOOD PRESSURE: 58 MMHG | WEIGHT: 218 LBS

## 2025-04-24 DIAGNOSIS — I63.9 CEREBRAL INFARCTION, UNSPECIFIED: ICD-10-CM

## 2025-04-24 DIAGNOSIS — Z45.09 ENCOUNTER FOR ADJUSTMENT AND MANAGEMENT OF OTHER CARDIAC DEVICE: ICD-10-CM

## 2025-04-24 DIAGNOSIS — Z51.89 ENCOUNTER FOR OTHER SPECIFIED AFTERCARE: ICD-10-CM

## 2025-04-24 PROCEDURE — 93285 PRGRMG DEV EVAL SCRMS IP: CPT

## 2025-04-24 PROCEDURE — 99212 OFFICE O/P EST SF 10 MIN: CPT

## 2025-04-24 RX ORDER — LISINOPRIL 10 MG/1
10 TABLET ORAL DAILY
Refills: 0 | Status: ACTIVE | COMMUNITY
Start: 2025-04-18

## 2025-04-24 RX ORDER — INSULIN GLARGINE 100 [IU]/ML
INJECTION, SOLUTION SUBCUTANEOUS DAILY
Refills: 0 | Status: ACTIVE | COMMUNITY

## 2025-04-24 RX ORDER — PREDNISONE 10 MG/1
10 TABLET ORAL DAILY
Refills: 0 | Status: ACTIVE | COMMUNITY
Start: 2025-04-18

## 2025-04-24 RX ORDER — LEVOFLOXACIN 500 MG/1
500 TABLET, FILM COATED ORAL DAILY
Refills: 0 | Status: ACTIVE | COMMUNITY

## 2025-04-24 RX ORDER — INSULIN LISPRO 100 [IU]/ML
100 INJECTION, SOLUTION INTRAVENOUS; SUBCUTANEOUS 3 TIMES DAILY
Refills: 0 | Status: ACTIVE | COMMUNITY
Start: 2025-04-18

## 2025-04-24 RX ORDER — INSULIN LISPRO 100 U/ML
100 INJECTION, SOLUTION SUBCUTANEOUS TWICE DAILY
Refills: 0 | Status: ACTIVE | COMMUNITY

## 2025-04-25 PROBLEM — Z45.09 ENCOUNTER FOR LOOP RECORDER CHECK: Status: ACTIVE | Noted: 2025-04-25

## 2025-04-25 PROBLEM — Z51.89 VISIT FOR WOUND CHECK: Status: ACTIVE | Noted: 2025-04-25

## 2025-04-30 ENCOUNTER — APPOINTMENT (OUTPATIENT)
Dept: NEUROSURGERY | Facility: CLINIC | Age: 75
End: 2025-04-30

## 2025-04-30 VITALS
HEIGHT: 62 IN | BODY MASS INDEX: 39.67 KG/M2 | WEIGHT: 215.6 LBS | SYSTOLIC BLOOD PRESSURE: 119 MMHG | DIASTOLIC BLOOD PRESSURE: 60 MMHG

## 2025-04-30 DIAGNOSIS — I66.02 OCCLUSION AND STENOSIS OF LEFT MIDDLE CEREBRAL ARTERY: ICD-10-CM

## 2025-04-30 PROCEDURE — 99203 OFFICE O/P NEW LOW 30 MIN: CPT

## 2025-05-01 ENCOUNTER — APPOINTMENT (OUTPATIENT)
Dept: NEUROLOGY | Facility: CLINIC | Age: 75
End: 2025-05-01

## 2025-05-07 ENCOUNTER — APPOINTMENT (OUTPATIENT)
Dept: NEUROSURGERY | Facility: CLINIC | Age: 75
End: 2025-05-07

## 2025-05-07 PROCEDURE — 99214 OFFICE O/P EST MOD 30 MIN: CPT | Mod: 93

## 2025-05-12 ENCOUNTER — RESULT REVIEW (OUTPATIENT)
Age: 75
End: 2025-05-12

## 2025-05-12 ENCOUNTER — OUTPATIENT (OUTPATIENT)
Dept: OUTPATIENT SERVICES | Facility: HOSPITAL | Age: 75
LOS: 1 days | End: 2025-05-12
Payer: MEDICARE

## 2025-05-12 ENCOUNTER — APPOINTMENT (OUTPATIENT)
Dept: CARDIOLOGY | Facility: CLINIC | Age: 75
End: 2025-05-12
Payer: MEDICARE

## 2025-05-12 VITALS
BODY MASS INDEX: 38.83 KG/M2 | HEART RATE: 84 BPM | WEIGHT: 211 LBS | HEIGHT: 62 IN | DIASTOLIC BLOOD PRESSURE: 62 MMHG | OXYGEN SATURATION: 97 % | RESPIRATION RATE: 16 BRPM | SYSTOLIC BLOOD PRESSURE: 114 MMHG

## 2025-05-12 VITALS
TEMPERATURE: 98 F | SYSTOLIC BLOOD PRESSURE: 103 MMHG | OXYGEN SATURATION: 96 % | HEART RATE: 80 BPM | DIASTOLIC BLOOD PRESSURE: 64 MMHG | HEIGHT: 62 IN | RESPIRATION RATE: 18 BRPM | WEIGHT: 212.08 LBS

## 2025-05-12 DIAGNOSIS — Z01.818 ENCOUNTER FOR OTHER PREPROCEDURAL EXAMINATION: ICD-10-CM

## 2025-05-12 DIAGNOSIS — Z00.8 ENCOUNTER FOR OTHER GENERAL EXAMINATION: ICD-10-CM

## 2025-05-12 DIAGNOSIS — Z96.659 PRESENCE OF UNSPECIFIED ARTIFICIAL KNEE JOINT: Chronic | ICD-10-CM

## 2025-05-12 DIAGNOSIS — Z87.39 PERSONAL HISTORY OF OTHER DISEASES OF THE MUSCULOSKELETAL SYSTEM AND CONNECTIVE TISSUE: Chronic | ICD-10-CM

## 2025-05-12 DIAGNOSIS — I66.02 OCCLUSION AND STENOSIS OF LEFT MIDDLE CEREBRAL ARTERY: ICD-10-CM

## 2025-05-12 DIAGNOSIS — Z98.890 OTHER SPECIFIED POSTPROCEDURAL STATES: Chronic | ICD-10-CM

## 2025-05-12 LAB
A1C WITH ESTIMATED AVERAGE GLUCOSE RESULT: 8.6 % — HIGH (ref 4–5.6)
ALBUMIN SERPL ELPH-MCNC: 3.9 G/DL — SIGNIFICANT CHANGE UP (ref 3.5–5.2)
ALP SERPL-CCNC: 131 U/L — HIGH (ref 30–115)
ALT FLD-CCNC: 26 U/L — SIGNIFICANT CHANGE UP (ref 0–41)
ANION GAP SERPL CALC-SCNC: 16 MMOL/L — HIGH (ref 7–14)
APTT BLD: 28.2 SEC — SIGNIFICANT CHANGE UP (ref 27–39.2)
AST SERPL-CCNC: 32 U/L — SIGNIFICANT CHANGE UP (ref 0–41)
BASOPHILS # BLD AUTO: 0.01 K/UL — SIGNIFICANT CHANGE UP (ref 0–0.2)
BASOPHILS NFR BLD AUTO: 0.3 % — SIGNIFICANT CHANGE UP (ref 0–1)
BILIRUB SERPL-MCNC: 0.5 MG/DL — SIGNIFICANT CHANGE UP (ref 0.2–1.2)
BUN SERPL-MCNC: 47 MG/DL — HIGH (ref 10–20)
CALCIUM SERPL-MCNC: 10 MG/DL — SIGNIFICANT CHANGE UP (ref 8.4–10.5)
CHLORIDE SERPL-SCNC: 97 MMOL/L — LOW (ref 98–110)
CO2 SERPL-SCNC: 19 MMOL/L — SIGNIFICANT CHANGE UP (ref 17–32)
CREAT SERPL-MCNC: 2.5 MG/DL — HIGH (ref 0.7–1.5)
EGFR: 19 ML/MIN/1.73M2 — LOW
EGFR: 19 ML/MIN/1.73M2 — LOW
EOSINOPHIL # BLD AUTO: 0.09 K/UL — SIGNIFICANT CHANGE UP (ref 0–0.7)
EOSINOPHIL NFR BLD AUTO: 2.6 % — SIGNIFICANT CHANGE UP (ref 0–8)
ESTIMATED AVERAGE GLUCOSE: 200 MG/DL — HIGH (ref 68–114)
GLUCOSE SERPL-MCNC: 197 MG/DL — HIGH (ref 70–99)
HCT VFR BLD CALC: 27.3 % — LOW (ref 37–47)
HGB BLD-MCNC: 9.6 G/DL — LOW (ref 12–16)
IMM GRANULOCYTES NFR BLD AUTO: 0.3 % — SIGNIFICANT CHANGE UP (ref 0.1–0.3)
INR BLD: 0.91 RATIO — SIGNIFICANT CHANGE UP (ref 0.65–1.3)
LYMPHOCYTES # BLD AUTO: 1.11 K/UL — LOW (ref 1.2–3.4)
LYMPHOCYTES # BLD AUTO: 31.9 % — SIGNIFICANT CHANGE UP (ref 20.5–51.1)
MCHC RBC-ENTMCNC: 35.2 G/DL — SIGNIFICANT CHANGE UP (ref 32–37)
MCHC RBC-ENTMCNC: 36.6 PG — HIGH (ref 27–31)
MCV RBC AUTO: 104.2 FL — HIGH (ref 81–99)
MONOCYTES # BLD AUTO: 0.5 K/UL — SIGNIFICANT CHANGE UP (ref 0.1–0.6)
MONOCYTES NFR BLD AUTO: 14.4 % — HIGH (ref 1.7–9.3)
NEUTROPHILS # BLD AUTO: 1.76 K/UL — SIGNIFICANT CHANGE UP (ref 1.4–6.5)
NEUTROPHILS NFR BLD AUTO: 50.5 % — SIGNIFICANT CHANGE UP (ref 42.2–75.2)
NRBC BLD AUTO-RTO: 0 /100 WBCS — SIGNIFICANT CHANGE UP (ref 0–0)
PLATELET # BLD AUTO: 193 K/UL — SIGNIFICANT CHANGE UP (ref 130–400)
PMV BLD: 9.1 FL — SIGNIFICANT CHANGE UP (ref 7.4–10.4)
POTASSIUM SERPL-MCNC: 4.2 MMOL/L — SIGNIFICANT CHANGE UP (ref 3.5–5)
POTASSIUM SERPL-SCNC: 4.2 MMOL/L — SIGNIFICANT CHANGE UP (ref 3.5–5)
PROT SERPL-MCNC: 6.2 G/DL — SIGNIFICANT CHANGE UP (ref 6–8)
PROTHROM AB SERPL-ACNC: 10.7 SEC — SIGNIFICANT CHANGE UP (ref 9.95–12.87)
RBC # BLD: 2.62 M/UL — LOW (ref 4.2–5.4)
RBC # FLD: 14 % — SIGNIFICANT CHANGE UP (ref 11.5–14.5)
SODIUM SERPL-SCNC: 132 MMOL/L — LOW (ref 135–146)
WBC # BLD: 3.48 K/UL — LOW (ref 4.8–10.8)
WBC # FLD AUTO: 3.48 K/UL — LOW (ref 4.8–10.8)

## 2025-05-12 PROCEDURE — 80053 COMPREHEN METABOLIC PANEL: CPT

## 2025-05-12 PROCEDURE — 99214 OFFICE O/P EST MOD 30 MIN: CPT

## 2025-05-12 PROCEDURE — 85730 THROMBOPLASTIN TIME PARTIAL: CPT

## 2025-05-12 PROCEDURE — 70549 MR ANGIOGRAPH NECK W/O&W/DYE: CPT | Mod: 26

## 2025-05-12 PROCEDURE — G2211 COMPLEX E/M VISIT ADD ON: CPT

## 2025-05-12 PROCEDURE — 93000 ELECTROCARDIOGRAM COMPLETE: CPT

## 2025-05-12 PROCEDURE — A9579: CPT

## 2025-05-12 PROCEDURE — 99214 OFFICE O/P EST MOD 30 MIN: CPT | Mod: 25

## 2025-05-12 PROCEDURE — 70549 MR ANGIOGRAPH NECK W/O&W/DYE: CPT

## 2025-05-12 PROCEDURE — 99204 OFFICE O/P NEW MOD 45 MIN: CPT

## 2025-05-12 PROCEDURE — 70546 MR ANGIOGRAPH HEAD W/O&W/DYE: CPT

## 2025-05-12 PROCEDURE — 83036 HEMOGLOBIN GLYCOSYLATED A1C: CPT

## 2025-05-12 PROCEDURE — 36415 COLL VENOUS BLD VENIPUNCTURE: CPT

## 2025-05-12 PROCEDURE — 85610 PROTHROMBIN TIME: CPT

## 2025-05-12 PROCEDURE — 70546 MR ANGIOGRAPH HEAD W/O&W/DYE: CPT | Mod: 26

## 2025-05-12 PROCEDURE — 85025 COMPLETE CBC W/AUTO DIFF WBC: CPT

## 2025-05-12 RX ORDER — AZITHROMYCIN 250 MG/1
250 TABLET, FILM COATED ORAL
Qty: 6 | Refills: 0 | Status: DISCONTINUED | COMMUNITY
Start: 2024-12-24

## 2025-05-12 RX ORDER — LISINOPRIL 5 MG/1
1 TABLET ORAL
Refills: 0 | DISCHARGE

## 2025-05-12 RX ORDER — SODIUM BICARBONATE 650 MG/1
650 TABLET ORAL
Refills: 0 | Status: ACTIVE | COMMUNITY
Start: 2025-05-12

## 2025-05-12 RX ORDER — ROSUVASTATIN CALCIUM 20 MG/1
20 TABLET, FILM COATED ORAL
Qty: 100 | Refills: 0 | Status: DISCONTINUED | COMMUNITY
Start: 2025-03-10

## 2025-05-12 NOTE — H&P PST ADULT - NSICDXPASTMEDICALHX_GEN_ALL_CORE_FT
PAST MEDICAL HISTORY:  Arthritis     DM (diabetes mellitus)     History of TIAs     HTN (hypertension)     Hypothyroid     CORTEZ on CPAP     Stage 3 chronic kidney disease

## 2025-05-12 NOTE — H&P PST ADULT - HISTORY OF PRESENT ILLNESS
----- Message from Chantel Kate DO sent at 3/21/2023  7:49 AM EDT -----  Diverticulitis. Sent antibiotic in. If symptoms worsen to ED.   Small kidney stone  tht is not obstruction and is on right ( nothing further needed for this)   PATIENT CURRENTLY DENIES CHEST PAIN  SHORTNESS OF BREATH  PALPITATIONS,  DYSURIA, OR UPPER RESPIRATORY INFECTION IN PAST 2 WEEKS    COVID + 4/12/25 WHILE IN [facility] FOR REHAB.     Denies travel outside the [place] in the past 30 days  Patient denies any signs or symptoms of COVID 19 and denies contact with known positive individuals.         Anesthesia Alert  NO--Difficult Airway  NO--History of neck surgery or radiation  NO--Limited ROM of neck  NO--History of Malignant hyperthermia  NO--No personal or family history of Pseudocholinesterase deficiency.  YES--Prior Anesthesia Complication WAKES UP DURING SURGERY   NO--Latex Allergy  NO--Loose teeth  NO--History of Rheumatoid Arthritis  YES--Bleeding risk BRILINTA/ASPRIN DAILY   YES--CORTEZ CPAP AT NIGHT  NO--Other_____  CLASS I    DASI 3.94 USES WALKER     RCRI 3    Diabetes education given during PAST visit.      PT DENIES ANY RASHES, ABRASION, OR OPEN WOUNDS OR CUTS    AS PER THE PT, THIS IS HIS/HER COMPLETE MEDICAL AND SURGICAL HX, INCLUDING MEDICATIONS PRESCRIBED AND OVER THE COUNTER    Patient/Guardian understands the instructions and was given the opportunity to ask questions and have them answered.    pt denies any suicidal ideation or thoughts, pt states not a threat to self or others

## 2025-05-12 NOTE — H&P PST ADULT - NSICDXPASTSURGICALHX_GEN_ALL_CORE_FT
PAST SURGICAL HISTORY:  H/O carpal tunnel repair     H/O total knee replacement     History of trigger finger

## 2025-05-12 NOTE — H&P PST ADULT - REASON FOR ADMISSION
75 Y/O F WITH PMHX HTN, DM, CORTEZ ON CPAP, CKD STAGE 3,  CVA/ TIA, SCHEDULED FOR PAST FOR CEREBRAL ANGIOGRAM PLUS LEFT MIDDLE CEREBRAL ARTERY STENT FOR M1 STENOSIS UNDER GA WITH DR JAUREGUI ON 5/20/25. PER NEUROLOGY NOTE 4/11/25 " Patient initially presented to the ED on 03/20 for dysarthria and R-sided hemiparesis from her PCP office. Patient states a few days before her PCP visit, she "felt off" and kept dropping things with her R hand. She further endorses she was dragging her R foot. Initial NIHSS was 1 for dysarthria. Neurological workup revealed L MCA territory subcortical infarcts and near complete occlusion vs severe stenosis of the L M1 segment. CTA H/N further revealed moderate stenosis of the R carotid bulb and carotid siphon, moderate focal stenosis of the R A2 segment and moderate stenosis within the L V4 segment. PTA she was on Clopidogrel, denies any missed doses. PRU was noted to be subtherapeutic (239) and she was switched to Brilinta".  PT OFFERS NO COMPLAINTS AT THIS TIME. NO NEW NEUROLOGICAL SYMPTOMS PER PT.

## 2025-05-13 DIAGNOSIS — Z01.818 ENCOUNTER FOR OTHER PREPROCEDURAL EXAMINATION: ICD-10-CM

## 2025-05-13 DIAGNOSIS — I66.02 OCCLUSION AND STENOSIS OF LEFT MIDDLE CEREBRAL ARTERY: ICD-10-CM

## 2025-05-14 ENCOUNTER — NON-APPOINTMENT (OUTPATIENT)
Age: 75
End: 2025-05-14

## 2025-05-16 PROBLEM — N18.30 CHRONIC KIDNEY DISEASE, STAGE 3 UNSPECIFIED: Chronic | Status: ACTIVE | Noted: 2025-05-12

## 2025-05-16 PROBLEM — G47.33 OBSTRUCTIVE SLEEP APNEA (ADULT) (PEDIATRIC): Chronic | Status: ACTIVE | Noted: 2025-05-12

## 2025-05-20 ENCOUNTER — INPATIENT (INPATIENT)
Facility: HOSPITAL | Age: 75
LOS: 2 days | Discharge: HOME HEALTH | DRG: 68 | End: 2025-05-23
Attending: STUDENT IN AN ORGANIZED HEALTH CARE EDUCATION/TRAINING PROGRAM | Admitting: NEUROLOGICAL SURGERY
Payer: MEDICARE

## 2025-05-20 ENCOUNTER — APPOINTMENT (OUTPATIENT)
Dept: NEUROSURGERY | Facility: HOSPITAL | Age: 75
End: 2025-05-20

## 2025-05-20 VITALS
WEIGHT: 212.08 LBS | RESPIRATION RATE: 20 BRPM | DIASTOLIC BLOOD PRESSURE: 60 MMHG | OXYGEN SATURATION: 100 % | SYSTOLIC BLOOD PRESSURE: 139 MMHG | TEMPERATURE: 97 F

## 2025-05-20 DIAGNOSIS — Z98.890 OTHER SPECIFIED POSTPROCEDURAL STATES: Chronic | ICD-10-CM

## 2025-05-20 DIAGNOSIS — Z87.39 PERSONAL HISTORY OF OTHER DISEASES OF THE MUSCULOSKELETAL SYSTEM AND CONNECTIVE TISSUE: Chronic | ICD-10-CM

## 2025-05-20 DIAGNOSIS — I66.02 OCCLUSION AND STENOSIS OF LEFT MIDDLE CEREBRAL ARTERY: ICD-10-CM

## 2025-05-20 DIAGNOSIS — Z96.659 PRESENCE OF UNSPECIFIED ARTIFICIAL KNEE JOINT: Chronic | ICD-10-CM

## 2025-05-20 LAB
A1C WITH ESTIMATED AVERAGE GLUCOSE RESULT: 8.1 % — HIGH (ref 4–5.6)
ALBUMIN SERPL ELPH-MCNC: 3.6 G/DL — SIGNIFICANT CHANGE UP (ref 3.5–5.2)
ALP SERPL-CCNC: 116 U/L — HIGH (ref 30–115)
ALT FLD-CCNC: 22 U/L — SIGNIFICANT CHANGE UP (ref 0–41)
ANION GAP SERPL CALC-SCNC: 12 MMOL/L — SIGNIFICANT CHANGE UP (ref 7–14)
ANION GAP SERPL CALC-SCNC: 13 MMOL/L — SIGNIFICANT CHANGE UP (ref 7–14)
ANISOCYTOSIS BLD QL: SLIGHT — SIGNIFICANT CHANGE UP
APTT BLD: >200 SEC — CRITICAL HIGH (ref 27–39.2)
AST SERPL-CCNC: 25 U/L — SIGNIFICANT CHANGE UP (ref 0–41)
BASOPHILS # BLD AUTO: 0 K/UL — SIGNIFICANT CHANGE UP (ref 0–0.2)
BASOPHILS NFR BLD AUTO: 0 % — SIGNIFICANT CHANGE UP (ref 0–1)
BILIRUB SERPL-MCNC: 0.3 MG/DL — SIGNIFICANT CHANGE UP (ref 0.2–1.2)
BLD GP AB SCN SERPL QL: SIGNIFICANT CHANGE UP
BUN SERPL-MCNC: 27 MG/DL — HIGH (ref 10–20)
BUN SERPL-MCNC: 27 MG/DL — HIGH (ref 10–20)
BUN SERPL-MCNC: 29 MG/DL — HIGH (ref 10–20)
CALCIUM SERPL-MCNC: 8.7 MG/DL — SIGNIFICANT CHANGE UP (ref 8.4–10.5)
CALCIUM SERPL-MCNC: 8.8 MG/DL — SIGNIFICANT CHANGE UP (ref 8.4–10.5)
CALCIUM SERPL-MCNC: 9.1 MG/DL — SIGNIFICANT CHANGE UP (ref 8.4–10.5)
CHLORIDE SERPL-SCNC: 105 MMOL/L — SIGNIFICANT CHANGE UP (ref 98–110)
CHLORIDE SERPL-SCNC: 106 MMOL/L — SIGNIFICANT CHANGE UP (ref 98–110)
CHLORIDE SERPL-SCNC: 107 MMOL/L — SIGNIFICANT CHANGE UP (ref 98–110)
CO2 SERPL-SCNC: 23 MMOL/L — SIGNIFICANT CHANGE UP (ref 17–32)
CREAT SERPL-MCNC: 2.2 MG/DL — HIGH (ref 0.7–1.5)
CREAT SERPL-MCNC: 2.4 MG/DL — HIGH (ref 0.7–1.5)
CREAT SERPL-MCNC: 2.4 MG/DL — HIGH (ref 0.7–1.5)
EGFR: 20 ML/MIN/1.73M2 — LOW
EGFR: 23 ML/MIN/1.73M2 — LOW
EGFR: 23 ML/MIN/1.73M2 — LOW
EOSINOPHIL # BLD AUTO: 0.06 K/UL — SIGNIFICANT CHANGE UP (ref 0–0.7)
EOSINOPHIL NFR BLD AUTO: 1.7 % — SIGNIFICANT CHANGE UP (ref 0–8)
ESTIMATED AVERAGE GLUCOSE: 186 MG/DL — HIGH (ref 68–114)
GIANT PLATELETS BLD QL SMEAR: PRESENT — SIGNIFICANT CHANGE UP
GLUCOSE BLDC GLUCOMTR-MCNC: 179 MG/DL — HIGH (ref 70–99)
GLUCOSE BLDC GLUCOMTR-MCNC: 201 MG/DL — HIGH (ref 70–99)
GLUCOSE BLDC GLUCOMTR-MCNC: 93 MG/DL — SIGNIFICANT CHANGE UP (ref 70–99)
GLUCOSE SERPL-MCNC: 112 MG/DL — HIGH (ref 70–99)
GLUCOSE SERPL-MCNC: 182 MG/DL — HIGH (ref 70–99)
GLUCOSE SERPL-MCNC: 264 MG/DL — HIGH (ref 70–99)
HCT VFR BLD CALC: 24.5 % — LOW (ref 37–47)
HCT VFR BLD CALC: 25.6 % — LOW (ref 37–47)
HGB BLD-MCNC: 8.4 G/DL — LOW (ref 12–16)
HGB BLD-MCNC: 8.7 G/DL — LOW (ref 12–16)
INR BLD: 1.05 RATIO — SIGNIFICANT CHANGE UP (ref 0.65–1.3)
LYMPHOCYTES # BLD AUTO: 0.88 K/UL — LOW (ref 1.2–3.4)
LYMPHOCYTES # BLD AUTO: 27 % — SIGNIFICANT CHANGE UP (ref 20.5–51.1)
MAGNESIUM SERPL-MCNC: 1.9 MG/DL — SIGNIFICANT CHANGE UP (ref 1.8–2.4)
MANUAL SMEAR VERIFICATION: SIGNIFICANT CHANGE UP
MCHC RBC-ENTMCNC: 34 G/DL — SIGNIFICANT CHANGE UP (ref 32–37)
MCHC RBC-ENTMCNC: 34.3 G/DL — SIGNIFICANT CHANGE UP (ref 32–37)
MCHC RBC-ENTMCNC: 37 PG — HIGH (ref 27–31)
MCHC RBC-ENTMCNC: 37.3 PG — HIGH (ref 27–31)
MCV RBC AUTO: 107.9 FL — HIGH (ref 81–99)
MCV RBC AUTO: 109.9 FL — HIGH (ref 81–99)
MONOCYTES # BLD AUTO: 0.31 K/UL — SIGNIFICANT CHANGE UP (ref 0.1–0.6)
MONOCYTES NFR BLD AUTO: 9.6 % — HIGH (ref 1.7–9.3)
MRSA PCR RESULT.: NEGATIVE — SIGNIFICANT CHANGE UP
NEUTROPHILS # BLD AUTO: 2.02 K/UL — SIGNIFICANT CHANGE UP (ref 1.4–6.5)
NEUTROPHILS NFR BLD AUTO: 61.7 % — SIGNIFICANT CHANGE UP (ref 42.2–75.2)
NRBC BLD AUTO-RTO: 0 /100 WBCS — SIGNIFICANT CHANGE UP (ref 0–0)
PHOSPHATE SERPL-MCNC: 4 MG/DL — SIGNIFICANT CHANGE UP (ref 2.1–4.9)
PLAT MORPH BLD: NORMAL — SIGNIFICANT CHANGE UP
PLATELET # BLD AUTO: 150 K/UL — SIGNIFICANT CHANGE UP (ref 130–400)
PLATELET # BLD AUTO: 155 K/UL — SIGNIFICANT CHANGE UP (ref 130–400)
PMV BLD: 9.1 FL — SIGNIFICANT CHANGE UP (ref 7.4–10.4)
PMV BLD: 9.1 FL — SIGNIFICANT CHANGE UP (ref 7.4–10.4)
POLYCHROMASIA BLD QL SMEAR: SLIGHT — SIGNIFICANT CHANGE UP
POTASSIUM SERPL-MCNC: 4.5 MMOL/L — SIGNIFICANT CHANGE UP (ref 3.5–5)
POTASSIUM SERPL-MCNC: 4.7 MMOL/L — SIGNIFICANT CHANGE UP (ref 3.5–5)
POTASSIUM SERPL-MCNC: 4.8 MMOL/L — SIGNIFICANT CHANGE UP (ref 3.5–5)
POTASSIUM SERPL-SCNC: 4.5 MMOL/L — SIGNIFICANT CHANGE UP (ref 3.5–5)
POTASSIUM SERPL-SCNC: 4.7 MMOL/L — SIGNIFICANT CHANGE UP (ref 3.5–5)
POTASSIUM SERPL-SCNC: 4.8 MMOL/L — SIGNIFICANT CHANGE UP (ref 3.5–5)
PROT SERPL-MCNC: 5.4 G/DL — LOW (ref 6–8)
PROTHROM AB SERPL-ACNC: 12.4 SEC — SIGNIFICANT CHANGE UP (ref 9.95–12.87)
RBC # BLD: 2.27 M/UL — LOW (ref 4.2–5.4)
RBC # BLD: 2.33 M/UL — LOW (ref 4.2–5.4)
RBC # FLD: 14.6 % — HIGH (ref 11.5–14.5)
RBC # FLD: 14.7 % — HIGH (ref 11.5–14.5)
RBC BLD AUTO: ABNORMAL
SODIUM SERPL-SCNC: 140 MMOL/L — SIGNIFICANT CHANGE UP (ref 135–146)
SODIUM SERPL-SCNC: 142 MMOL/L — SIGNIFICANT CHANGE UP (ref 135–146)
SODIUM SERPL-SCNC: 143 MMOL/L — SIGNIFICANT CHANGE UP (ref 135–146)
TSH SERPL-MCNC: <0 UIU/ML — LOW (ref 0.27–4.2)
WBC # BLD: 2.87 K/UL — LOW (ref 4.8–10.8)
WBC # BLD: 3.27 K/UL — LOW (ref 4.8–10.8)
WBC # FLD AUTO: 2.87 K/UL — LOW (ref 4.8–10.8)
WBC # FLD AUTO: 3.27 K/UL — LOW (ref 4.8–10.8)

## 2025-05-20 PROCEDURE — 86900 BLOOD TYPING SEROLOGIC ABO: CPT

## 2025-05-20 PROCEDURE — 97161 PT EVAL LOW COMPLEX 20 MIN: CPT | Mod: GP

## 2025-05-20 PROCEDURE — 83036 HEMOGLOBIN GLYCOSYLATED A1C: CPT

## 2025-05-20 PROCEDURE — 85610 PROTHROMBIN TIME: CPT

## 2025-05-20 PROCEDURE — 84443 ASSAY THYROID STIM HORMONE: CPT

## 2025-05-20 PROCEDURE — 82607 VITAMIN B-12: CPT

## 2025-05-20 PROCEDURE — 36415 COLL VENOUS BLD VENIPUNCTURE: CPT

## 2025-05-20 PROCEDURE — 84100 ASSAY OF PHOSPHORUS: CPT

## 2025-05-20 PROCEDURE — C9399: CPT

## 2025-05-20 PROCEDURE — 84134 ASSAY OF PREALBUMIN: CPT

## 2025-05-20 PROCEDURE — 87640 STAPH A DNA AMP PROBE: CPT

## 2025-05-20 PROCEDURE — 82962 GLUCOSE BLOOD TEST: CPT

## 2025-05-20 PROCEDURE — 80053 COMPREHEN METABOLIC PANEL: CPT

## 2025-05-20 PROCEDURE — 61635 INTRACRAN ANGIOPLSTY W/STENT: CPT

## 2025-05-20 PROCEDURE — C1757: CPT

## 2025-05-20 PROCEDURE — 82330 ASSAY OF CALCIUM: CPT

## 2025-05-20 PROCEDURE — 76937 US GUIDE VASCULAR ACCESS: CPT

## 2025-05-20 PROCEDURE — 85027 COMPLETE CBC AUTOMATED: CPT

## 2025-05-20 PROCEDURE — 97166 OT EVAL MOD COMPLEX 45 MIN: CPT | Mod: GO

## 2025-05-20 PROCEDURE — C1894: CPT

## 2025-05-20 PROCEDURE — 87641 MR-STAPH DNA AMP PROBE: CPT

## 2025-05-20 PROCEDURE — 80048 BASIC METABOLIC PNL TOTAL CA: CPT

## 2025-05-20 PROCEDURE — C1887: CPT

## 2025-05-20 PROCEDURE — 61635 INTRACRAN ANGIOPLSTY W/STENT: CPT | Mod: LT

## 2025-05-20 PROCEDURE — 83735 ASSAY OF MAGNESIUM: CPT

## 2025-05-20 PROCEDURE — 86901 BLOOD TYPING SEROLOGIC RH(D): CPT

## 2025-05-20 PROCEDURE — 76377 3D RENDER W/INTRP POSTPROCES: CPT | Mod: 26

## 2025-05-20 PROCEDURE — 86850 RBC ANTIBODY SCREEN: CPT

## 2025-05-20 PROCEDURE — 80061 LIPID PANEL: CPT

## 2025-05-20 PROCEDURE — 97116 GAIT TRAINING THERAPY: CPT | Mod: GP

## 2025-05-20 PROCEDURE — C1876: CPT

## 2025-05-20 PROCEDURE — C1889: CPT

## 2025-05-20 PROCEDURE — 85025 COMPLETE CBC W/AUTO DIFF WBC: CPT

## 2025-05-20 PROCEDURE — 85730 THROMBOPLASTIN TIME PARTIAL: CPT

## 2025-05-20 PROCEDURE — 84439 ASSAY OF FREE THYROXINE: CPT

## 2025-05-20 PROCEDURE — C1769: CPT

## 2025-05-20 PROCEDURE — 82746 ASSAY OF FOLIC ACID SERUM: CPT

## 2025-05-20 RX ORDER — INSULIN LISPRO 100 U/ML
INJECTION, SOLUTION INTRAVENOUS; SUBCUTANEOUS
Refills: 0 | Status: DISCONTINUED | OUTPATIENT
Start: 2025-05-20 | End: 2025-05-23

## 2025-05-20 RX ORDER — TICAGRELOR 90 MG/1
90 TABLET ORAL EVERY 12 HOURS
Refills: 0 | Status: DISCONTINUED | OUTPATIENT
Start: 2025-05-21 | End: 2025-05-23

## 2025-05-20 RX ORDER — ASPIRIN 325 MG
81 TABLET ORAL DAILY
Refills: 0 | Status: DISCONTINUED | OUTPATIENT
Start: 2025-05-21 | End: 2025-05-23

## 2025-05-20 RX ORDER — GLUCAGON 3 MG/1
1 POWDER NASAL ONCE
Refills: 0 | Status: DISCONTINUED | OUTPATIENT
Start: 2025-05-20 | End: 2025-05-23

## 2025-05-20 RX ORDER — GABAPENTIN 400 MG/1
400 CAPSULE ORAL DAILY
Refills: 0 | Status: DISCONTINUED | OUTPATIENT
Start: 2025-05-20 | End: 2025-05-23

## 2025-05-20 RX ORDER — DEXTROSE 50 % IN WATER 50 %
25 SYRINGE (ML) INTRAVENOUS ONCE
Refills: 0 | Status: DISCONTINUED | OUTPATIENT
Start: 2025-05-20 | End: 2025-05-23

## 2025-05-20 RX ORDER — BISACODYL 5 MG
5 TABLET, DELAYED RELEASE (ENTERIC COATED) ORAL DAILY
Refills: 0 | Status: DISCONTINUED | OUTPATIENT
Start: 2025-05-20 | End: 2025-05-23

## 2025-05-20 RX ORDER — SODIUM CHLORIDE 9 G/1000ML
1000 INJECTION, SOLUTION INTRAVENOUS
Refills: 0 | Status: DISCONTINUED | OUTPATIENT
Start: 2025-05-20 | End: 2025-05-23

## 2025-05-20 RX ORDER — SODIUM BICARBONATE 1 MEQ/ML
5 SYRINGE (ML) INTRAVENOUS
Refills: 0 | Status: DISCONTINUED | OUTPATIENT
Start: 2025-05-20 | End: 2025-05-20

## 2025-05-20 RX ORDER — LEVOTHYROXINE SODIUM 300 MCG
150 TABLET ORAL DAILY
Refills: 0 | Status: DISCONTINUED | OUTPATIENT
Start: 2025-05-20 | End: 2025-05-21

## 2025-05-20 RX ORDER — LISINOPRIL 5 MG/1
10 TABLET ORAL DAILY
Refills: 0 | Status: DISCONTINUED | OUTPATIENT
Start: 2025-05-20 | End: 2025-05-23

## 2025-05-20 RX ORDER — SODIUM BICARBONATE 1 MEQ/ML
1 SYRINGE (ML) INTRAVENOUS
Refills: 0 | DISCHARGE

## 2025-05-20 RX ORDER — DEXTROSE 50 % IN WATER 50 %
15 SYRINGE (ML) INTRAVENOUS ONCE
Refills: 0 | Status: DISCONTINUED | OUTPATIENT
Start: 2025-05-20 | End: 2025-05-23

## 2025-05-20 RX ORDER — ROSUVASTATIN CALCIUM 20 MG/1
40 TABLET, FILM COATED ORAL AT BEDTIME
Refills: 0 | Status: DISCONTINUED | OUTPATIENT
Start: 2025-05-20 | End: 2025-05-23

## 2025-05-20 RX ORDER — LISINOPRIL 5 MG/1
10 TABLET ORAL DAILY
Refills: 0 | Status: DISCONTINUED | OUTPATIENT
Start: 2025-05-20 | End: 2025-05-20

## 2025-05-20 RX ORDER — LISINOPRIL 5 MG/1
1 TABLET ORAL
Refills: 0 | DISCHARGE

## 2025-05-20 RX ORDER — TICAGRELOR 90 MG/1
90 TABLET ORAL ONCE
Refills: 0 | Status: COMPLETED | OUTPATIENT
Start: 2025-05-20 | End: 2025-05-20

## 2025-05-20 RX ADMIN — TICAGRELOR 90 MILLIGRAM(S): 90 TABLET ORAL at 17:22

## 2025-05-20 RX ADMIN — ROSUVASTATIN CALCIUM 40 MILLIGRAM(S): 20 TABLET, FILM COATED ORAL at 22:04

## 2025-05-20 RX ADMIN — Medication 40 MILLIGRAM(S): at 14:16

## 2025-05-20 RX ADMIN — INSULIN LISPRO 4: 100 INJECTION, SOLUTION INTRAVENOUS; SUBCUTANEOUS at 17:24

## 2025-05-20 NOTE — H&P ADULT - NSHPPHYSICALEXAM_GEN_ALL_CORE
General: In no acute distress  Skin: Warm, dry, no rashes or lesions observed  Head: Normocephalic, atraumatic  Eyes: PERRLA, EOMI, sclera and conjunctiva without erythema or discharge  ENT: No nasal discharge, airway clear, MMM  Neck: supple and nontender, no lymphadenopathy noted  Cardiac: Regular rate and rhythm, normal s1 and s2  Respiratory: Equal chest expansion bilaterally, no wheezes or crackles  Extremities: RUE c/d/i, nontender, +radial pulse, no cyanosis or edema  Neurological: AAOx3, following 2-step commands, V1-V3  intact to light touch, moving all extremities antigravity without drift (b/l LE weakness at baseline),  sensation intact to light touch throughout, +EOM weakness, PERRLA, visual fields full, no dysarthria, no aphasia, no dysmetria on FTN or HTS testing

## 2025-05-20 NOTE — BRIEF OPERATIVE NOTE - OPERATION/FINDINGS
Procedure: Diagnostic Cerebral Angiogram + Left M1 MCA Middle Cerebral Artery angioplasty and stenting  Contrast: Visipaque 320   IA medications: Heparin 3000 IU, Verapamil 2.5mg, Nitroglycerin 200 mcg  Implants placed: 1 stent in the left MCA  Complications: None    Preliminary Report:  A selective diagnostic cerebral angiogram + left MCA angioplasty and stenting x 1 was performed.     Please continue to monitor the arteriotomy site for signs of hematoma/ bleeding/ infection or for diminished and absent distal pulses or temperature/ color changes. Notify a provider if any of the above is noticed or with any additional questions/ concerns.   NIHSS pre procedure: 0-1 EOM weakness and +mild dysarthria(baseline)  NIHSS post procedure: Unchanged  Extremity: +mild swelling proximal to the arteriotomy site, nontender, no hematoma, no bleeding, no color or temperature changes between b/l UE    Official IR neuro procedure note is to follow.

## 2025-05-20 NOTE — H&P ADULT - HISTORY OF PRESENT ILLNESS
The patient is a 74-year-old female with a PMHx of left trigger finger, AS, MR, osteoporosis, DM with Charcot foot, and HTN, who was evaluated in the neurosurgery outpatient office due to known focal severe left M1 Middle Cerebral Artery narrowing (confirmed on MRA 5/12/2025). She has had three prior strokes while on Aspirin and Plavix, however she was found to be subtherapeutic on Plavix and subsequently switched to Brilinta. She is also on Crestor 4mg and is s/p loop recorder placement. The patient had an MRA NOVA 5 /12/2025 and now presents for a diagnostic cerebral angiogram. NPO except medication after midnight last night. IV normal saline on arrival. Brilinta 90mg bid and Aspirin 81mg daily PO last dose this morning. The patient is a 74-year-old female with a PMHx of left trigger finger, AS, MR, osteoporosis, DM with Charcot foot, and HTN, who was evaluated in the neurosurgery outpatient office due to known focal severe left M1 Middle Cerebral Artery narrowing (confirmed on MRA 5/12/2025). She has had three prior strokes while on Aspirin and Plavix, however she was found to be subtherapeutic on Plavix and subsequently switched to Brilinta. She is also s/p loop recorder placement. The patient had an MRA NOVA 5 /12/2025 and now presents for a diagnostic cerebral angiogram. NPO except medication after midnight last night. IV normal saline on arrival. Brilinta 90mg bid and Aspirin 81mg daily PO last dose this morning.

## 2025-05-20 NOTE — PRE PROCEDURE NOTE - PRE PROCEDURE EVALUATION
Interventional Neuro Radiology  Pre-Procedure Note PA-C    HPI:  The patient is a 74-year-old female with a PMHx of left trigger finger, AS, MR, osteoporosis, DM with Charcot foot, and HTN, who was evaluated in the neurosurgery outpatient office due to known focal severe left M1 Middle Cerebral Artery narrowing (confirmed on MRA 5/12/2025). She has had three prior strokes while on Aspirin and Plavix, however she was found to be subtherapeutic on Plavix and subsequently switched to Brilinta. She is also on Crestor 4mg and is s/p loop recorder placement. The patient had an MRA NOVA 5 /12/2025 and now presents for a diagnostic cerebral angiogram. NPO except medication after midnight last night. IV normal saline on arrival. Brilinta 90mg bid and Aspirin 81mg daily PO last dose this morning.      Allergies: clindamycin (Rash)    PAST MEDICAL & SURGICAL HISTORY:  HTN (hypertension)  History of TIAs  Hypothyroid  Arthritis  DM (diabetes mellitus)  CORTEZ on CPAP  Stage 3 chronic kidney disease  H/O total knee replacement  H/O carpal tunnel repair  History of trigger finger  FAMILY HISTORY:  FH: breast cancer    FH: leukemia    Current Medications: gabapentin 400 milliGRAM(s) Oral daily  levothyroxine 150 MICROGram(s) Oral daily  lisinopril 10 milliGRAM(s) Oral daily  rosuvastatin 40 milliGRAM(s) Oral at bedtime  sodium bicarbonate Mouth Rinse 5 milliLiter(s) Swish and Spit two times a day    Assessment/Plan:   This is a 74-year-old female who presents for a diagnostic cerebral angiogram + intracranial stent.   Procedure, goals, risks, benefits and alternatives  were discussed with patient and patient's family.  All questions were answered to best understanding.   Risks discussed include but are not limited to stroke, vessel injury, hemorrhage, and/or hematoma.  Patient demonstrates understanding  of all risks involved with this procedure and wishes to continue.     Appropriate consent was obtained from patient and consent is in the patient's chart.     Interventional Neuro Radiology  Pre-Procedure Note PA-C    HPI:  The patient is a 74-year-old female with a PMHx of left trigger finger, AS, MR, osteoporosis, DM with Charcot foot, and HTN, who was evaluated in the neurosurgery outpatient office due to known focal severe left M1 Middle Cerebral Artery narrowing (confirmed on MRA 5/12/2025). She has had three prior strokes while on Aspirin and Plavix, however she was found to be subtherapeutic on Plavix and subsequently switched to Brilinta. She is also s/p loop recorder placement. The patient had an MRA NOVA 5 /12/2025 and now presents for a diagnostic cerebral angiogram. NPO except medication after midnight last night. IV normal saline on arrival. Brilinta 90mg bid and Aspirin 81mg daily PO last dose this morning.      Allergies: clindamycin (Rash)    PAST MEDICAL & SURGICAL HISTORY:  HTN (hypertension)  History of TIAs  Hypothyroid  Arthritis  DM (diabetes mellitus)  CORTEZ on CPAP  Stage 3 chronic kidney disease  H/O total knee replacement  H/O carpal tunnel repair  History of trigger finger  FAMILY HISTORY:  FH: breast cancer    FH: leukemia    Current Medications: gabapentin 400 milliGRAM(s) Oral daily  levothyroxine 150 MICROGram(s) Oral daily  lisinopril 10 milliGRAM(s) Oral daily  rosuvastatin 40 milliGRAM(s) Oral at bedtime  sodium bicarbonate Mouth Rinse 5 milliLiter(s) Swish and Spit two times a day    Assessment/Plan:   This is a 74-year-old female who presents for a diagnostic cerebral angiogram + intracranial stent.   Procedure, goals, risks, benefits and alternatives  were discussed with patient and patient's family.  All questions were answered to best understanding.   Risks discussed include but are not limited to stroke, vessel injury, hemorrhage, and/or hematoma.  Patient demonstrates understanding  of all risks involved with this procedure and wishes to continue.     Appropriate consent was obtained from patient and consent is in the patient's chart.

## 2025-05-20 NOTE — CONSULT NOTE ADULT - ASSESSMENT
ASSESSMENT: 74-year-old female with focal severe left M1 MCA narrowing, on ASA/Brilinta. Patient is now s/p left MCA stent x1 and balloon angioplasty via right radial arteriotomy. No intra-procedure complications. Post-NIHSS = 0    PLAN:   NEURO:  - Neuro checks/NIHSS as per post-NI protocol  - ICU delirium precautions  - c/w home gabapentin  - Pain management: Tylenol prn  - Antiemetics: Zofran prn  Activity: [X] Increase as tolerated [X] PT [X] OT    PULM:  - Incentive spirometry 10x/hr while awake  - HOB > 45 degrees  - Aspiration precautions  - Keep SaO2 > 95%    CV:  - Keep -140  - Telemetry monitoring  - c/w home lisinopril   - Daily statin  - LDL, 3/21: 78    RENAL:  - Strict I/Os, daily weights  - Keep euvolemic  - Keep normonatremic   - Keep Magnesium level > 2; Potassium > 4  - Monitor lytes, replete as needed  - IVF/IVL when tolerating PO intake    GI:  Diet: Dysphagia screen and then advance diet as tolerated  GI prophylaxis [X] protonix [home medication]  Bowel regimen [X] Miralax [X] senna [] other:    ENDO: DM  - Goal Serum glucose 140-180  - FS AC/HS w/ ISS  - c/w home synthroid   - HgA1c, 5/12: 8.6  - TSH, 3/21: 1.08    HEME/ONC:  VTE prophylaxis: [X] SCDs [] chemoprophylaxis [] hold chemoprophylaxis due to: [] high risk of DVT/PE on admission due to:  - Continue ASA/Brilinta    ID:  - Keep normothermic, avoid fevers    MISC:  PT/OT/SLP    CODE STATUS: [X] Full Code    DISPOSITION: [X] NSICU

## 2025-05-20 NOTE — PATIENT PROFILE ADULT - FALL HARM RISK - HARM RISK INTERVENTIONS
Assistance with ambulation/Assistance OOB with selected safe patient handling equipment/Communicate Risk of Fall with Harm to all staff/Discuss with provider need for PT consult/Monitor gait and stability/Provide patient with walking aids - walker, cane, crutches/Reinforce activity limits and safety measures with patient and family/Sit up slowly, dangle for a short time, stand at bedside before walking/Tailored Fall Risk Interventions/Use of alarms - bed, chair and/or voice tab/Visual Cue: Yellow wristband and red socks/Bed in lowest position, wheels locked, appropriate side rails in place/Call bell, personal items and telephone in reach/Instruct patient to call for assistance before getting out of bed or chair/Non-slip footwear when patient is out of bed/[place] to call system/Physically safe environment - no spills, clutter or unnecessary equipment/Purposeful Proactive Rounding/Room/bathroom lighting operational, light cord in reach

## 2025-05-20 NOTE — BRIEF OPERATIVE NOTE - COMMENTS
-140   Continue Brilinta 90mg q 12 hours and Aspirin 81mg daily (last dose this AM)  Continue Atorvastatin   Continued management by the neuro ICU

## 2025-05-20 NOTE — H&P ADULT - ASSESSMENT
The patient is a 74-year-old female with a PMHx of left trigger finger, AS, MR, osteoporosis, DM with Charcot foot, and HTN, who was evaluated in the neurosurgery outpatient office due to known focal severe left M1 Middle Cerebral Artery narrowing (confirmed on MRA 5/12/2025). She has had three prior strokes while on Aspirin and Plavix, however she was found to be subtherapeutic on Plavix and subsequently switched to Brilinta. She is also on Crestor 4mg and is s/p loop recorder placement. The patient had an MRA NOVA 5 /12/2025 and now presents for a diagnostic cerebral angiogram. NPO except medication after midnight last night. IV normal saline on arrival. Brilinta 90mg bid and Aspirin 81mg daily PO last dose this morning.      Plan:  Admit to neuro ICU s/p procedure     #Neuro:  - NIHSS vitals arteriotomy site and pulses q 15 min x 2 hr q 30 min x 6 hr q 1 hr x 16 hr total 24 hr recovery,   - DAPT Aspirin 81mg daily and Brilinta 90mg q 12 hours last dose this morning  - PT/OT  - Stat CTH if any worsening or deterioration in neurological examination and call NCC/Neurology    #CV:  - -140    #Resp:  - HOB   - Aspiration precautions  - Keep SaO2 > 95%    #Renal/Fluid/Electrolytes:  - Strict I/Os  - Keep euvolemic  - Keep normonatremic   - Keep Magnesium level > 2  - Monitor lytes, replete as needed    #GI:  - Dysphagia screening/speech and swallow evaluation s/p procedure     #Heme/Onc:  - SCS while in bed    #Endo:  - Keep normoglycemic    #ID:  - Keep normothermic; avoid fevers    Code status: Full code  Disposition: ICU

## 2025-05-20 NOTE — PRE-ANESTHESIA EVALUATION ADULT - NSANTHRISKNONERD_GEN_ALL_CORE
-Urine culture growing Enterococcus > 100,000 cfu/ml.  Await sensitivities  -Continue broad spectrum antibiotics for now and tailor based off results.   No risk alerts present

## 2025-05-20 NOTE — CONSULT NOTE ADULT - CRITICAL CARE ATTENDING COMMENT
s/p L M1 stenting, R radial access, with band being deflated, noted to have hematoma, 9cc back re-inflated, +distal pulse, IR called to bedside, cont monitor, on ASA/Brilinta, continue   alert, fully oriented, EOMI, DAVIS

## 2025-05-20 NOTE — CONSULT NOTE ADULT - SUBJECTIVE AND OBJECTIVE BOX
SUMMARY:HPI:  The patient is a 74-year-old female with a PMHx of left trigger finger, AS, MR, osteoporosis, DM with Charcot foot, and HTN, who was evaluated in the neurosurgery outpatient office due to known focal severe left M1 Middle Cerebral Artery narrowing (confirmed on MRA 5/12/2025). She has had three prior strokes while on Aspirin and Plavix, however she was found to be subtherapeutic on Plavix and subsequently switched to Brilinta. She is also s/p loop recorder placement. The patient had an MRA NOVA 5 /12/2025 and now presents for a diagnostic cerebral angiogram. NPO except medication after midnight last night. IV normal saline on arrival. Brilinta 90mg bid and Aspirin 81mg daily PO last dose this morning. (20 May 2025 09:48)    INTERVAL EVENTS: Patient is now s/p left MCA stent x1 and balloon angioplasty via right radial arteriotomy. No intra-procedure complications. Post-NIHSS = 0    ADMISSION SCORES:  GCS: 15  NIHSS: 0    Allergies    clindamycin (Rash)    Intolerances        REVIEW OF SYSTEMS: [ ] Unable to Assess due to neurologic exam   [X] All ROS addressed below are non-contributory, except:  Neuro: [ ] Headache [ ] Back pain [ ] Numbness [ ] Weakness [ ] Ataxia [ ] Dizziness [ ] Aphasia [ ] Dysarthria [ ] Visual disturbance  Resp: [ ] Shortness of breath/dyspnea, [ ] Orthopnea [ ] Cough  CV: [ ] Chest pain [ ] Palpitation [ ] Lightheadedness [ ] Syncope  Renal: [ ] Thirst [ ] Edema  GI: [ ] Nausea [ ] Emesis [ ] Abdominal pain [ ] Constipation [ ] Diarrhea  Hem: [ ] Hematemesis [ ] bright red blood per rectum  ID: [ ] Fever [ ] Chills [ ] Dysuria  ENT: [ ] Rhinorrhea    DEVICES:   [ ] Restraints [ ] ET tube [ ] central line [ ] arterial line [ ] durbin [ ] NGT/OGT [ ] EVD [ ] LD [ ] DASHA/HMV [ ] Trach [ ] PEG [ ] Chest Tube     VITALS: [X] Reviewed  Vital Signs Last 24 Hrs  T(C): 36.1 (20 May 2025 09:35), Max: 36.1 (20 May 2025 09:23)  T(F): 97 (20 May 2025 09:35), Max: 97 (20 May 2025 09:23)  HR: 68 (20 May 2025 09:35) (68 - 68)  BP: 139/60 (20 May 2025 09:35) (139/60 - 139/60)  BP(mean): --  RR: 20 (20 May 2025 09:35) (20 - 20)  SpO2: 99% (20 May 2025 09:35) (99% - 100%)      CAPILLARY BLOOD GLUCOSE      POCT Blood Glucose.: 179 mg/dL (20 May 2025 09:42)        LABS:    STROKE CORE MEASURES:   HgA1c, 5/12: 8.6  TSH, 3/21: 1.08  LDL, 3/21: 78     MEDICATION LEVELS:     IMAGING/DATA: [X] Reviewed    IVF FLUIDS/MEDICATIONS: [X] Reviewed  MEDICATIONS  (STANDING):  dextrose 5%. 1000 milliLiter(s) (50 mL/Hr) IV Continuous <Continuous>  dextrose 5%. 1000 milliLiter(s) (100 mL/Hr) IV Continuous <Continuous>  dextrose 50% Injectable 25 Gram(s) IV Push once  dextrose 50% Injectable 25 Gram(s) IV Push once  gabapentin 400 milliGRAM(s) Oral daily  glucagon  Injectable 1 milliGRAM(s) IntraMuscular once  insulin lispro (ADMELOG) corrective regimen sliding scale   SubCutaneous three times a day before meals  levothyroxine 150 MICROGram(s) Oral daily  lisinopril 10 milliGRAM(s) Oral daily  pantoprazole  Injectable 40 milliGRAM(s) IV Push daily  rosuvastatin 40 milliGRAM(s) Oral at bedtime  sodium bicarbonate Mouth Rinse 5 milliLiter(s) Swish and Spit two times a day  sodium chloride 0.9%. 1000 milliLiter(s) (100 mL/Hr) IV Continuous <Continuous>  ticagrelor 90 milliGRAM(s) Oral once    MEDICATIONS  (PRN):  bisacodyl 5 milliGRAM(s) Oral daily PRN Constipation  dextrose Oral Gel 15 Gram(s) Oral once PRN Blood Glucose LESS THAN 70 milliGRAM(s)/deciliter    I&O's Summary      EXAMINATION:  PHYSICAL EXAM:    Constitutional: No Acute Distress     Neurological: Awake, alert oriented to person, place and time, Following Commands, PERRL, EOMI, No Gaze Preference, Face Symmetrical, Speech Fluent, No dysmetria, No ataxia, No nystagmus     Motor exam:          Upper extremity                         Delt     Bicep     Tricep    HG                                                 R         5/5 5/5 5/5 5/5                                               L          5/5 5/5 5/5 5/5          Lower extremity                        HF         KF        KE       DF         PF                                                  R        5/5 5/5 5/5 5/5 5/5                                               L         5/5 5/5 5/5 5/5 5/5                                                 Sensation: [ ] intact to light touch  [ ] decreased:     Reflexes: Deep Tendon Reflexes Intact     Pulmonary: Clear to Auscultation, No rales, No rhonchi, No wheezes     Cardiovascular: S1, S2, Regular rate and rhythm     Gastrointestinal: Soft, Non-tender, Non-distended     Extremities: No calf tenderness     Incision:       NIHSS = 0 SUMMARY:HPI:  The patient is a 74-year-old female with a PMHx of left trigger finger, AS, MR, osteoporosis, DM with Charcot foot, and HTN, who was evaluated in the neurosurgery outpatient office due to known focal severe left M1 Middle Cerebral Artery narrowing (confirmed on MRA 5/12/2025). She has had three prior strokes while on Aspirin and Plavix, however she was found to be subtherapeutic on Plavix and subsequently switched to Brilinta. She is also s/p loop recorder placement. The patient had an MRA NOVA 5 /12/2025 and now presents for a diagnostic cerebral angiogram. NPO except medication after midnight last night. IV normal saline on arrival. Brilinta 90mg bid and Aspirin 81mg daily PO last dose this morning. (20 May 2025 09:48)    INTERVAL EVENTS: Patient is now s/p left MCA stent x1 and balloon angioplasty via right radial arteriotomy. No intra-procedure complications. Post-NIHSS = 0    ADMISSION SCORES:  GCS: 15  NIHSS: 0    Allergies    clindamycin (Rash)    Intolerances        REVIEW OF SYSTEMS: [ ] Unable to Assess due to neurologic exam   [X] All ROS addressed below are non-contributory, except:  Neuro: [ ] Headache [ ] Back pain [ ] Numbness [ ] Weakness [ ] Ataxia [ ] Dizziness [ ] Aphasia [ ] Dysarthria [ ] Visual disturbance  Resp: [ ] Shortness of breath/dyspnea, [ ] Orthopnea [ ] Cough  CV: [ ] Chest pain [ ] Palpitation [ ] Lightheadedness [ ] Syncope  Renal: [ ] Thirst [ ] Edema  GI: [ ] Nausea [ ] Emesis [ ] Abdominal pain [ ] Constipation [ ] Diarrhea  Hem: [ ] Hematemesis [ ] bright red blood per rectum  ID: [ ] Fever [ ] Chills [ ] Dysuria  ENT: [ ] Rhinorrhea    DEVICES:   [ ] Restraints [ ] ET tube [ ] central line [ ] arterial line [ ] durbin [ ] NGT/OGT [ ] EVD [ ] LD [ ] DASHA/HMV [ ] Trach [ ] PEG [ ] Chest Tube     VITALS: [X] Reviewed  Vital Signs Last 24 Hrs  T(C): 36.1 (20 May 2025 09:35), Max: 36.1 (20 May 2025 09:23)  T(F): 97 (20 May 2025 09:35), Max: 97 (20 May 2025 09:23)  HR: 68 (20 May 2025 09:35) (68 - 68)  BP: 139/60 (20 May 2025 09:35) (139/60 - 139/60)  BP(mean): --  RR: 20 (20 May 2025 09:35) (20 - 20)  SpO2: 99% (20 May 2025 09:35) (99% - 100%)      CAPILLARY BLOOD GLUCOSE      POCT Blood Glucose.: 179 mg/dL (20 May 2025 09:42)        LABS:    STROKE CORE MEASURES:   HgA1c, 5/12: 8.6  TSH, 3/21: 1.08  LDL, 3/21: 78     MEDICATION LEVELS:     IMAGING/DATA: [X] Reviewed    IVF FLUIDS/MEDICATIONS: [X] Reviewed  MEDICATIONS  (STANDING):  dextrose 5%. 1000 milliLiter(s) (50 mL/Hr) IV Continuous <Continuous>  dextrose 5%. 1000 milliLiter(s) (100 mL/Hr) IV Continuous <Continuous>  dextrose 50% Injectable 25 Gram(s) IV Push once  dextrose 50% Injectable 25 Gram(s) IV Push once  gabapentin 400 milliGRAM(s) Oral daily  glucagon  Injectable 1 milliGRAM(s) IntraMuscular once  insulin lispro (ADMELOG) corrective regimen sliding scale   SubCutaneous three times a day before meals  levothyroxine 150 MICROGram(s) Oral daily  lisinopril 10 milliGRAM(s) Oral daily  pantoprazole  Injectable 40 milliGRAM(s) IV Push daily  rosuvastatin 40 milliGRAM(s) Oral at bedtime  sodium bicarbonate Mouth Rinse 5 milliLiter(s) Swish and Spit two times a day  sodium chloride 0.9%. 1000 milliLiter(s) (100 mL/Hr) IV Continuous <Continuous>  ticagrelor 90 milliGRAM(s) Oral once    MEDICATIONS  (PRN):  bisacodyl 5 milliGRAM(s) Oral daily PRN Constipation  dextrose Oral Gel 15 Gram(s) Oral once PRN Blood Glucose LESS THAN 70 milliGRAM(s)/deciliter    I&O's Summary      PHYSICAL EXAM:    Constitutional: No Acute Distress     Neurological: Awake, alert oriented to person, place and time, Following Commands, PERRL, EOMI, No Gaze Preference, Face Symmetrical, Speech Fluent, No dysmetria    Motor exam:          Upper extremity                         Delt     Bicep     Tricep    HG                                                 R         5/5 5/5 5/5 5/5                                               L          5/5 5/5 5/5 5/5          Lower extremity                        HF         KF        KE       DF         PF                                                  R        5/5 5/5 5/5 5/5 5/5                                               L         5/5 5/5 5/5 5/5 5/5                                                   Pulmonary: Clear to Auscultation, No rales, No rhonchi, No wheezes     Cardiovascular: S1, S2, Regular rate and rhythm     Gastrointestinal: Soft, Non-tender, Non-distended     Extremities: No calf tenderness     Incision:       NIHSS = 0

## 2025-05-21 ENCOUNTER — TRANSCRIPTION ENCOUNTER (OUTPATIENT)
Age: 75
End: 2025-05-21

## 2025-05-21 LAB
ALBUMIN SERPL ELPH-MCNC: 3.4 G/DL — LOW (ref 3.5–5.2)
ALP SERPL-CCNC: 114 U/L — SIGNIFICANT CHANGE UP (ref 30–115)
ALT FLD-CCNC: 21 U/L — SIGNIFICANT CHANGE UP (ref 0–41)
ANION GAP SERPL CALC-SCNC: 10 MMOL/L — SIGNIFICANT CHANGE UP (ref 7–14)
ANION GAP SERPL CALC-SCNC: 8 MMOL/L — SIGNIFICANT CHANGE UP (ref 7–14)
ANISOCYTOSIS BLD QL: SLIGHT — SIGNIFICANT CHANGE UP
AST SERPL-CCNC: 26 U/L — SIGNIFICANT CHANGE UP (ref 0–41)
BASOPHILS # BLD AUTO: 0 K/UL — SIGNIFICANT CHANGE UP (ref 0–0.2)
BASOPHILS NFR BLD AUTO: 0 % — SIGNIFICANT CHANGE UP (ref 0–1)
BILIRUB SERPL-MCNC: 0.3 MG/DL — SIGNIFICANT CHANGE UP (ref 0.2–1.2)
BUN SERPL-MCNC: 28 MG/DL — HIGH (ref 10–20)
BUN SERPL-MCNC: 30 MG/DL — HIGH (ref 10–20)
CALCIUM SERPL-MCNC: 8.7 MG/DL — SIGNIFICANT CHANGE UP (ref 8.4–10.5)
CALCIUM SERPL-MCNC: 8.9 MG/DL — SIGNIFICANT CHANGE UP (ref 8.4–10.5)
CHLORIDE SERPL-SCNC: 106 MMOL/L — SIGNIFICANT CHANGE UP (ref 98–110)
CHLORIDE SERPL-SCNC: 106 MMOL/L — SIGNIFICANT CHANGE UP (ref 98–110)
CHOLEST SERPL-MCNC: 112 MG/DL — SIGNIFICANT CHANGE UP
CO2 SERPL-SCNC: 23 MMOL/L — SIGNIFICANT CHANGE UP (ref 17–32)
CO2 SERPL-SCNC: 25 MMOL/L — SIGNIFICANT CHANGE UP (ref 17–32)
CREAT SERPL-MCNC: 2.3 MG/DL — HIGH (ref 0.7–1.5)
CREAT SERPL-MCNC: 2.4 MG/DL — HIGH (ref 0.7–1.5)
EGFR: 20 ML/MIN/1.73M2 — LOW
EGFR: 20 ML/MIN/1.73M2 — LOW
EGFR: 21 ML/MIN/1.73M2 — LOW
EGFR: 21 ML/MIN/1.73M2 — LOW
EOSINOPHIL # BLD AUTO: 0.05 K/UL — SIGNIFICANT CHANGE UP (ref 0–0.7)
EOSINOPHIL NFR BLD AUTO: 1.7 % — SIGNIFICANT CHANGE UP (ref 0–8)
GLUCOSE BLDC GLUCOMTR-MCNC: 168 MG/DL — HIGH (ref 70–99)
GLUCOSE BLDC GLUCOMTR-MCNC: 245 MG/DL — HIGH (ref 70–99)
GLUCOSE BLDC GLUCOMTR-MCNC: 265 MG/DL — HIGH (ref 70–99)
GLUCOSE BLDC GLUCOMTR-MCNC: 281 MG/DL — HIGH (ref 70–99)
GLUCOSE BLDC GLUCOMTR-MCNC: 361 MG/DL — HIGH (ref 70–99)
GLUCOSE BLDC GLUCOMTR-MCNC: 422 MG/DL — HIGH (ref 70–99)
GLUCOSE SERPL-MCNC: 254 MG/DL — HIGH (ref 70–99)
GLUCOSE SERPL-MCNC: 328 MG/DL — HIGH (ref 70–99)
HCT VFR BLD CALC: 23.6 % — LOW (ref 37–47)
HCT VFR BLD CALC: 25.3 % — LOW (ref 37–47)
HDLC SERPL-MCNC: 36 MG/DL — LOW
HGB BLD-MCNC: 7.9 G/DL — LOW (ref 12–16)
HGB BLD-MCNC: 8.7 G/DL — LOW (ref 12–16)
HYPOCHROMIA BLD QL: SIGNIFICANT CHANGE UP
LDLC SERPL-MCNC: 45 MG/DL — SIGNIFICANT CHANGE UP
LIPID PNL WITH DIRECT LDL SERPL: 45 MG/DL — SIGNIFICANT CHANGE UP
LYMPHOCYTES # BLD AUTO: 0.41 K/UL — LOW (ref 1.2–3.4)
LYMPHOCYTES # BLD AUTO: 13.2 % — LOW (ref 20.5–51.1)
MACROCYTES BLD QL: SLIGHT — SIGNIFICANT CHANGE UP
MAGNESIUM SERPL-MCNC: 2.1 MG/DL — SIGNIFICANT CHANGE UP (ref 1.8–2.4)
MANUAL SMEAR VERIFICATION: SIGNIFICANT CHANGE UP
MCHC RBC-ENTMCNC: 33.5 G/DL — SIGNIFICANT CHANGE UP (ref 32–37)
MCHC RBC-ENTMCNC: 34.4 G/DL — SIGNIFICANT CHANGE UP (ref 32–37)
MCHC RBC-ENTMCNC: 37.1 PG — HIGH (ref 27–31)
MCHC RBC-ENTMCNC: 37.7 PG — HIGH (ref 27–31)
MCV RBC AUTO: 109.5 FL — HIGH (ref 81–99)
MCV RBC AUTO: 110.8 FL — HIGH (ref 81–99)
MONOCYTES # BLD AUTO: 0.38 K/UL — SIGNIFICANT CHANGE UP (ref 0.1–0.6)
MONOCYTES NFR BLD AUTO: 12.3 % — HIGH (ref 1.7–9.3)
NEUTROPHILS # BLD AUTO: 2.27 K/UL — SIGNIFICANT CHANGE UP (ref 1.4–6.5)
NEUTROPHILS NFR BLD AUTO: 72.8 % — SIGNIFICANT CHANGE UP (ref 42.2–75.2)
NONHDLC SERPL-MCNC: 76 MG/DL — SIGNIFICANT CHANGE UP
NRBC BLD AUTO-RTO: 0 /100 WBCS — SIGNIFICANT CHANGE UP (ref 0–0)
PHOSPHATE SERPL-MCNC: 4.1 MG/DL — SIGNIFICANT CHANGE UP (ref 2.1–4.9)
PLAT MORPH BLD: NORMAL — SIGNIFICANT CHANGE UP
PLATELET # BLD AUTO: 147 K/UL — SIGNIFICANT CHANGE UP (ref 130–400)
PLATELET # BLD AUTO: 153 K/UL — SIGNIFICANT CHANGE UP (ref 130–400)
PMV BLD: 9 FL — SIGNIFICANT CHANGE UP (ref 7.4–10.4)
PMV BLD: 9.1 FL — SIGNIFICANT CHANGE UP (ref 7.4–10.4)
POTASSIUM SERPL-MCNC: 4.9 MMOL/L — SIGNIFICANT CHANGE UP (ref 3.5–5)
POTASSIUM SERPL-MCNC: 5.3 MMOL/L — HIGH (ref 3.5–5)
POTASSIUM SERPL-SCNC: 4.9 MMOL/L — SIGNIFICANT CHANGE UP (ref 3.5–5)
POTASSIUM SERPL-SCNC: 5.3 MMOL/L — HIGH (ref 3.5–5)
PREALB SERPL-MCNC: 17 MG/DL — LOW (ref 20–40)
PROT SERPL-MCNC: 5 G/DL — LOW (ref 6–8)
RBC # BLD: 2.13 M/UL — LOW (ref 4.2–5.4)
RBC # BLD: 2.31 M/UL — LOW (ref 4.2–5.4)
RBC # FLD: 14.5 % — SIGNIFICANT CHANGE UP (ref 11.5–14.5)
RBC # FLD: 14.6 % — HIGH (ref 11.5–14.5)
RBC BLD AUTO: ABNORMAL
SODIUM SERPL-SCNC: 137 MMOL/L — SIGNIFICANT CHANGE UP (ref 135–146)
SODIUM SERPL-SCNC: 141 MMOL/L — SIGNIFICANT CHANGE UP (ref 135–146)
T4 FREE SERPL-MCNC: 1.8 NG/DL — SIGNIFICANT CHANGE UP (ref 0.9–1.8)
TRIGL SERPL-MCNC: 191 MG/DL — HIGH
TSH SERPL-MCNC: 0.02 UIU/ML — LOW (ref 0.27–4.2)
WBC # BLD: 3.04 K/UL — LOW (ref 4.8–10.8)
WBC # BLD: 3.12 K/UL — LOW (ref 4.8–10.8)
WBC # FLD AUTO: 3.04 K/UL — LOW (ref 4.8–10.8)
WBC # FLD AUTO: 3.12 K/UL — LOW (ref 4.8–10.8)

## 2025-05-21 PROCEDURE — 99223 1ST HOSP IP/OBS HIGH 75: CPT

## 2025-05-21 RX ORDER — SODIUM ZIRCONIUM CYCLOSILICATE 5 G/5G
10 POWDER, FOR SUSPENSION ORAL ONCE
Refills: 0 | Status: COMPLETED | OUTPATIENT
Start: 2025-05-21 | End: 2025-05-21

## 2025-05-21 RX ORDER — INSULIN GLARGINE-YFGN 100 [IU]/ML
20 INJECTION, SOLUTION SUBCUTANEOUS AT BEDTIME
Refills: 0 | Status: DISCONTINUED | OUTPATIENT
Start: 2025-05-21 | End: 2025-05-22

## 2025-05-21 RX ORDER — INSULIN LISPRO 100 U/ML
5 INJECTION, SOLUTION INTRAVENOUS; SUBCUTANEOUS
Refills: 0 | Status: DISCONTINUED | OUTPATIENT
Start: 2025-05-21 | End: 2025-05-21

## 2025-05-21 RX ORDER — SODIUM CHLORIDE 9 G/1000ML
1000 INJECTION, SOLUTION INTRAVENOUS
Refills: 0 | Status: DISCONTINUED | OUTPATIENT
Start: 2025-05-21 | End: 2025-05-23

## 2025-05-21 RX ORDER — INSULIN LISPRO 100 U/ML
5 INJECTION, SOLUTION INTRAVENOUS; SUBCUTANEOUS ONCE
Refills: 0 | Status: COMPLETED | OUTPATIENT
Start: 2025-05-21 | End: 2025-05-21

## 2025-05-21 RX ORDER — INSULIN LISPRO 100 U/ML
7 INJECTION, SOLUTION INTRAVENOUS; SUBCUTANEOUS
Refills: 0 | Status: DISCONTINUED | OUTPATIENT
Start: 2025-05-21 | End: 2025-05-22

## 2025-05-21 RX ORDER — LEVOTHYROXINE SODIUM 300 MCG
125 TABLET ORAL DAILY
Refills: 0 | Status: DISCONTINUED | OUTPATIENT
Start: 2025-05-22 | End: 2025-05-23

## 2025-05-21 RX ADMIN — INSULIN LISPRO 7 UNIT(S): 100 INJECTION, SOLUTION INTRAVENOUS; SUBCUTANEOUS at 17:19

## 2025-05-21 RX ADMIN — LISINOPRIL 10 MILLIGRAM(S): 5 TABLET ORAL at 06:05

## 2025-05-21 RX ADMIN — INSULIN GLARGINE-YFGN 20 UNIT(S): 100 INJECTION, SOLUTION SUBCUTANEOUS at 22:03

## 2025-05-21 RX ADMIN — INSULIN LISPRO 10: 100 INJECTION, SOLUTION INTRAVENOUS; SUBCUTANEOUS at 12:04

## 2025-05-21 RX ADMIN — ROSUVASTATIN CALCIUM 40 MILLIGRAM(S): 20 TABLET, FILM COATED ORAL at 22:03

## 2025-05-21 RX ADMIN — GABAPENTIN 400 MILLIGRAM(S): 400 CAPSULE ORAL at 12:04

## 2025-05-21 RX ADMIN — Medication 81 MILLIGRAM(S): at 12:03

## 2025-05-21 RX ADMIN — INSULIN LISPRO 4: 100 INJECTION, SOLUTION INTRAVENOUS; SUBCUTANEOUS at 06:06

## 2025-05-21 RX ADMIN — TICAGRELOR 90 MILLIGRAM(S): 90 TABLET ORAL at 06:05

## 2025-05-21 RX ADMIN — TICAGRELOR 90 MILLIGRAM(S): 90 TABLET ORAL at 17:19

## 2025-05-21 RX ADMIN — Medication 250 MILLILITER(S): at 06:20

## 2025-05-21 RX ADMIN — Medication 100 MILLILITER(S): at 06:17

## 2025-05-21 RX ADMIN — Medication 150 MICROGRAM(S): at 06:19

## 2025-05-21 RX ADMIN — INSULIN LISPRO 5 UNIT(S): 100 INJECTION, SOLUTION INTRAVENOUS; SUBCUTANEOUS at 12:30

## 2025-05-21 RX ADMIN — SODIUM ZIRCONIUM CYCLOSILICATE 10 GRAM(S): 5 POWDER, FOR SUSPENSION ORAL at 14:05

## 2025-05-21 RX ADMIN — INSULIN LISPRO 6: 100 INJECTION, SOLUTION INTRAVENOUS; SUBCUTANEOUS at 17:19

## 2025-05-21 RX ADMIN — Medication 40 MILLIGRAM(S): at 12:04

## 2025-05-21 NOTE — DISCHARGE NOTE PROVIDER - NSDCCPTREATMENT_GEN_ALL_CORE_FT
PRINCIPAL PROCEDURE  Procedure: Insertion, stent, vessel, intracranial  Findings and Treatment:
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
## 2025-05-21 NOTE — CHART NOTE - NSCHARTNOTEFT_GEN_A_CORE
[facility] Transfer Note    Transfer from: NCCU    Transfer to: Stroke Unit    Accepting Physician:    Signout given to:     --------------------------------------------------------------------------------------------------------------------------------  HPI:  The patient is a 74-year-old female with a PMHx of left trigger finger, AS, MR, osteoporosis, DM with Charcot foot, and HTN, who was evaluated in the neurosurgery outpatient office due to known focal severe left M1 Middle Cerebral Artery narrowing (confirmed on MRA 5/12/2025). She has had three prior strokes while on Aspirin and Plavix, however she was found to be subtherapeutic on Plavix and subsequently switched to Brilinta. She is also s/p loop recorder placement. The patient had an MRA NOVA 5 /12/2025 and now presents for a diagnostic cerebral angiogram. NPO except medication after midnight last night. IV normal saline on arrival. Brilinta 90mg bid and Aspirin 81mg daily PO last dose this morning.     INTERVAL EVENTS:     No intra-procedure complications.   Post-NIHSS = 0    ADMISSION SCORES:  GCS: 15  NIHSS: 0    STROKE CORE MEASURES:   HGBA1C- 8.1  LDL- 45  Trig- 191  TSH- < 0  and Free T4- P     [facility] Hospital Course: Patient admitted to [facility] s/p left MCA stent x1 and balloon angioplasty via right radial arteriotomy yesterday (5/20/25). While in here patient 250mL ns bolus overnight for hypotension, with good effect. Patient has been neurologically and hemodynamically stable for downgrade to stroke unit.   --------------------------------------------------------------------------------------------------------------------------------  PAST MEDICAL & SURGICAL HISTORY:  HTN (hypertension)  History of TIAs  Hypothyroid  Arthritis  DM (diabetes mellitus)  CORTEZ on CPAP  Stage 3 chronic kidney disease  H/O total knee replacement  H/O carpal tunnel repair  History of trigger finger    Allergies  clindamycin (Rash)    ROS: per HPI    Examination:  PHYSICAL EXAM:  Constitutional: No Acute Distress   Neurological: Awake, alert oriented to person, place and time, Following Commands, PERRL, EOMI, No Gaze Preference, R Face ASymmetry    Motor exam:          Upper extremity                         Delt     Bicep     Tricep    HG                                                 R                                                      L          5/5        5/5        5/5       5/5          Lower extremity                        HF         KF        KE       DF         PF                                                  R                                                      L         5/5        5/5       5/5       5/5          5/5                                                 Sensation: [ ] intact to light touch  [ ] decreased:    Pulmonary: Clear to Auscultation, No rales, No rhonchi, No wheezes   Cardiovascular: S1, S2, Regular rate and rhythm   Gastrointestinal: Soft, Non-tender, Non-distended   Extremities: No calf tenderness     Vital Signs Last 24 Hrs  T(C): 36.7 (21 May 2025 16:00), Max: 36.7 (21 May 2025 16:00)  T(F): 98 (21 May 2025 16:00), Max: 98 (21 May 2025 16:00)  HR: 75 (21 May 2025 17:00) (73 - 89)  BP: 145/60 (21 May 2025 17:00) (114/48 - 145/60)  BP(mean): 86 (21 May 2025 17:00) (69 - 92)  RR: 19 (21 May 2025 17:00) (13 - 29)  SpO2: 95% (21 May 2025 17:00) (90% - 100%)    Parameters below as of 21 May 2025 17:00  Patient On (Oxygen Delivery Method): room air    I&O's Summary    20 May 2025 07:01  -  21 May 2025 07:00  --------------------------------------------------------  IN: 800 mL / OUT: 1300 mL / NET: -500 mL    21 May 2025 07:01  -  21 May 2025 18:24  --------------------------------------------------------  IN: 570 mL / OUT: 200 mL / NET: 370 mL      LABS:                    8.7    3.04  )-----------( 153      ( 21 May 2025 16:23 )             25.3     05-21    137  |  106  |  28[H]  ----------------------------<  328[H]  5.3[H]   |  23  |  2.3[H]    Ca    8.9      21 May 2025 11:14  Phos  4.1     05-21  Mg     2.1     05-21    TPro  5.0[L]  /  Alb  3.4[L]  /  TBili  0.3  /  DBili  x   /  AST  26  /  ALT  21  /  AlkPhos  114  05-21    PT/INR - ( 20 May 2025 13:24 )   PT: 12.40 sec;   INR: 1.05 ratio       PTT - ( 20 May 2025 13:24 )  PTT:>200.0 sec    --------------------------------------------------------------------------------------------------------------------------------  ASSESSMENT & PLAN:   The patient is a 74-year-old female with a PMHx of left trigger finger, AS, MR, osteoporosis, DM with Charcot foot, and HTN, who was evaluated in the neurosurgery outpatient office due to known focal severe left M1 Middle Cerebral Artery narrowing (confirmed on MRA 5/12/2025). She has had three prior strokes while on Aspirin and Plavix, however she was found to be subtherapeutic on Plavix and subsequently switched to Brilinta. She is also on Crestor 4mg and is s/p loop recorder placement. The patient had an MRA NOVA 5 /12/2025 and now presents for a diagnostic cerebral angiogram. NPO except medication after midnight last night. IV normal saline on arrival. Brilinta 90mg bid and Aspirin 81mg daily PO last dose this morning.      POD #1  s/p left MCA stent x1 and balloon angioplasty via right radial arteriotomy.     #Neuro:  - NIHSS checks q4h  - DAPT Aspirin 81mg daily and Brilinta 90mg q 12 hours   - PT/OT    #CV: HTN  - -140  - D/C stephanie   - Hold home meds since SBP     #Resp: Sleep apnea   - Non compliant with CPAP   - HOB   - Aspiration precautions  - Keep SaO2 > 92%  - OOB   - IS  10x q 1 while awake     #Renal/Fluid/Electrolytes:- Acute on Chronic renal insuff   - Strict I/Os  -Cr stablized 2.3 ( 2.4)  - Keep euvolemic  - LR at 50cc/hr   - BMP in am if Cr stable will D/C LR  - Keep normonatremic - 135- < 145   - Keep Magnesium level > 2  - Monitor lytes, replete as needed  - No gifty     #GI:-  GERD   - Home Protonix   - DASH and CCD   - Nl LFT's     #Heme/Onc: Anemia of Chronic Disease  - HGB stable   - SCS while in bed    #Endo: Type 2 DM/ Hypothyroid  - TSH- .02 and Free T4- 1.8 decrease dose 125mcg /day - F/U TSH and Free T4 in one mo.  - HISS - home Humalog 75/25  - Start Lispro 7u w/meals TID and Lantus 20u qHs  - Keep normoglycemic    #ID:  - Keep normothermic; avoid fevers    Code status: Full code  Disposition: Stroke unit    --------------------------------------------------------------------------------------------------------------------------------  FOR FOLLOW UP:  [ ] BMP in am if creat stable d/c IVF  [ ] BS control   [ ] TSH and Free T4 in a month s/p synthroid 150mcg ->125  [ ] Restart Home antiHTN when appropriated         NSICU  [id number] NCCU Transfer Note    Transfer from: NCCU    Transfer to: Stroke Unit    Accepting Physician: Dr. Poe     Signout given to: Lexi RM on 5/21/25 @ 7:20 PM    --------------------------------------------------------------------------------------------------------------------------------  HPI:  The patient is a 74-year-old female with a PMHx of left trigger finger, AS, MR, osteoporosis, DM with Charcot foot, and HTN, who was evaluated in the neurosurgery outpatient office due to known focal severe left M1 Middle Cerebral Artery narrowing (confirmed on MRA 5/12/2025). She has had three prior strokes while on Aspirin and Plavix, however she was found to be subtherapeutic on Plavix and subsequently switched to Brilinta. She is also s/p loop recorder placement. The patient had an MRA NOVA 5 /12/2025 and now presents for a diagnostic cerebral angiogram. NPO except medication after midnight last night. IV normal saline on arrival. Brilinta 90mg bid and Aspirin 81mg daily PO last dose this morning.     INTERVAL EVENTS:     No intra-procedure complications.   Post-NIHSS = 0    ADMISSION SCORES:  GCS: 15  NIHSS: 0    STROKE CORE MEASURES:   HGBA1C- 8.1  LDL- 45  Trig- 191  TSH- < 0  and Free T4- P     [facility] Hospital Course: Patient admitted to [facility] s/p left MCA stent x1 and balloon angioplasty via right radial arteriotomy yesterday (5/20/25). While in here patient 250mL ns bolus overnight for hypotension, with good effect. Patient has been neurologically and hemodynamically stable for downgrade to stroke unit.   --------------------------------------------------------------------------------------------------------------------------------  PAST MEDICAL & SURGICAL HISTORY:  HTN (hypertension)  History of TIAs  Hypothyroid  Arthritis  DM (diabetes mellitus)  CORTEZ on CPAP  Stage 3 chronic kidney disease  H/O total knee replacement  H/O carpal tunnel repair  History of trigger finger    Allergies  clindamycin (Rash)    ROS: per HPI    Examination:  PHYSICAL EXAM:  Constitutional: No Acute Distress   Neurological: Awake, alert oriented to person, place and time, Following Commands, PERRL, EOMI, No Gaze Preference, R Face ASymmetry    Motor exam:          Upper extremity                         Delt     Bicep     Tricep    HG                                                 R                                                      L          5/5        5/5        5/5       5/5          Lower extremity                        HF         KF        KE       DF         PF                                                  R                                                      L         5/5        5/5       5/5       5/5          5/5                                                 Sensation: [ ] intact to light touch  [ ] decreased:    Pulmonary: Clear to Auscultation, No rales, No rhonchi, No wheezes   Cardiovascular: S1, S2, Regular rate and rhythm   Gastrointestinal: Soft, Non-tender, Non-distended   Extremities: No calf tenderness     Vital Signs Last 24 Hrs  T(C): 36.7 (21 May 2025 16:00), Max: 36.7 (21 May 2025 16:00)  T(F): 98 (21 May 2025 16:00), Max: 98 (21 May 2025 16:00)  HR: 75 (21 May 2025 17:00) (73 - 89)  BP: 145/60 (21 May 2025 17:00) (114/48 - 145/60)  BP(mean): 86 (21 May 2025 17:00) (69 - 92)  RR: 19 (21 May 2025 17:00) (13 - 29)  SpO2: 95% (21 May 2025 17:00) (90% - 100%)    Parameters below as of 21 May 2025 17:00  Patient On (Oxygen Delivery Method): room air    I&O's Summary    20 May 2025 07:01  -  21 May 2025 07:00  --------------------------------------------------------  IN: 800 mL / OUT: 1300 mL / NET: -500 mL    21 May 2025 07:01  -  21 May 2025 18:24  --------------------------------------------------------  IN: 570 mL / OUT: 200 mL / NET: 370 mL      LABS:                    8.7    3.04  )-----------( 153      ( 21 May 2025 16:23 )             25.3     05-21    137  |  106  |  28[H]  ----------------------------<  328[H]  5.3[H]   |  23  |  2.3[H]    Ca    8.9      21 May 2025 11:14  Phos  4.1     05-21  Mg     2.1     05-21    TPro  5.0[L]  /  Alb  3.4[L]  /  TBili  0.3  /  DBili  x   /  AST  26  /  ALT  21  /  AlkPhos  114  05-21    PT/INR - ( 20 May 2025 13:24 )   PT: 12.40 sec;   INR: 1.05 ratio       PTT - ( 20 May 2025 13:24 )  PTT:>200.0 sec    --------------------------------------------------------------------------------------------------------------------------------  ASSESSMENT & PLAN:   The patient is a 74-year-old female with a PMHx of left trigger finger, AS, MR, osteoporosis, DM with Charcot foot, and HTN, who was evaluated in the neurosurgery outpatient office due to known focal severe left M1 Middle Cerebral Artery narrowing (confirmed on MRA 5/12/2025). She has had three prior strokes while on Aspirin and Plavix, however she was found to be subtherapeutic on Plavix and subsequently switched to Brilinta. She is also on Crestor 4mg and is s/p loop recorder placement. The patient had an MRA NOVA 5 /12/2025 and now presents for a diagnostic cerebral angiogram. NPO except medication after midnight last night. IV normal saline on arrival. Brilinta 90mg bid and Aspirin 81mg daily PO last dose this morning.      POD #1  s/p left MCA stent x1 and balloon angioplasty via right radial arteriotomy.     #Neuro:  - NIHSS checks q4h  - DAPT Aspirin 81mg daily and Brilinta 90mg q 12 hours   - PT/OT    #CV: HTN  - -140  - D/C stephanie   - Hold home meds since SBP     #Resp: Sleep apnea   - Non compliant with CPAP   - HOB   - Aspiration precautions  - Keep SaO2 > 92%  - OOB   - IS  10x q 1 while awake     #Renal/Fluid/Electrolytes:- Acute on Chronic renal insuff   - Strict I/Os  -Cr stablized 2.3 ( 2.4)  - Keep euvolemic  - LR at 50cc/hr   - BMP in am if Cr stable will D/C LR  - Keep normonatremic - 135- < 145   - Keep Magnesium level > 2  - Monitor lytes, replete as needed  - No durbin     #GI:-  GERD   - Home Protonix   - DASH and CCD   - Nl LFT's     #Heme/Onc: Anemia of Chronic Disease  - HGB stable   - SCS while in bed    #Endo: Type 2 DM/ Hypothyroid  - TSH- .02 and Free T4- 1.8 decrease dose 125mcg /day - F/U TSH and Free T4 in one mo.  - HISS - home Humalog 75/25  - Start Lispro 7u w/meals TID and Lantus 20u qHs  - Keep normoglycemic    #ID:  - Keep normothermic; avoid fevers    Code status: Full code  Disposition: Stroke unit    --------------------------------------------------------------------------------------------------------------------------------  FOR FOLLOW UP:  [ ] BMP in am if creat stable d/c IVF  [ ] BS control   [ ] TSH and Free T4 in a month s/p synthroid 150mcg ->125  [ ] Restart Home antiHTN when appropriated         ANITONE  [id number]

## 2025-05-21 NOTE — DISCHARGE NOTE PROVIDER - NSDCFUSCHEDAPPT_GEN_ALL_CORE_FT
[facility]  CARDIOLOGY [address]  Scheduled Appointment: 05/29/2025    Jared Eden  [facility]  NEUROSURG [address]  Scheduled Appointment: 05/30/2025    Naida Yost  [facility]  NEUROLOGY [address]  Scheduled Appointment: 07/17/2025    Edgardo Carrillo  [facility]  CARDIOLOGY [address]  Scheduled Appointment: 08/18/2025

## 2025-05-21 NOTE — OCCUPATIONAL THERAPY INITIAL EVALUATION ADULT - NSACTIVITYREC_GEN_A_OT
pt educated on RUE precautions including no lifting more than 10 pounds and functional implications. discussed safety with bathing with good understanding of all

## 2025-05-21 NOTE — PROGRESS NOTE ADULT - ASSESSMENT
The patient is a 74-year-old female with a PMHx of left trigger finger, AS, MR, osteoporosis, DM with Charcot foot, and HTN, who was evaluated in the neurosurgery outpatient office due to known focal severe left M1 Middle Cerebral Artery narrowing (confirmed on MRA 5/12/2025). She has had three prior strokes while on Aspirin and Plavix, however she was found to be subtherapeutic on Plavix and subsequently switched to Brilinta. She is also on Crestor 4mg and is s/p loop recorder placement. The patient had an MRA NOVA 5 /12/2025 and now presents for a diagnostic cerebral angiogram. NPO except medication after midnight last night. IV normal saline on arrival. Brilinta 90mg bid and Aspirin 81mg daily PO last dose this morning.      Plan:  Admit to neuro ICU s/p procedure     #Neuro:  - NIHSS vitals arteriotomy site and pulses q 15 min x 2 hr q 30 min x 6 hr q 1 hr x 16 hr total 24 hr recovery,   - DAPT Aspirin 81mg daily and Brilinta 90mg q 12 hours last dose this morning  - PT/OT  - Stat CTH if any worsening or deterioration in neurological examination and call NCC/Neurology    #CV:  - -140    #Resp:  - HOB   - Aspiration precautions  - Keep SaO2 > 95%    #Renal/Fluid/Electrolytes:  - Strict I/Os  - Keep euvolemic  - Keep normonatremic   - Keep Magnesium level > 2  - Monitor lytes, replete as needed    #GI:  - Dysphagia screening/speech and swallow evaluation s/p procedure     #Heme/Onc:  - SCS while in bed    #Endo:  - Keep normoglycemic    #ID:  - Keep normothermic; avoid fevers    Code status: Full code  Disposition: ICU       The patient is a 74-year-old female with a PMHx of left trigger finger, AS, MR, osteoporosis, DM with Charcot foot, and HTN, who was evaluated in the neurosurgery outpatient office due to known focal severe left M1 Middle Cerebral Artery narrowing (confirmed on MRA 5/12/2025). She has had three prior strokes while on Aspirin and Plavix, however she was found to be subtherapeutic on Plavix and subsequently switched to Brilinta. She is also on Crestor 4mg and is s/p loop recorder placement. The patient had an MRA NOVA 5 /12/2025 and now presents for a diagnostic cerebral angiogram. NPO except medication after midnight last night. IV normal saline on arrival. Brilinta 90mg bid and Aspirin 81mg daily PO last dose this morning.      Plan:  Admit to neuro ICU s/p procedure     #Neuro:  - NIHSS per IR protocol - complete 1215   - DAPT Aspirin 81mg daily and Brilinta 90mg q 12 hours l  - PT/OT    #CV: HTN  - -140  - Hold home meds since SBP     #Resp: Sleep apnea   - Non compliant with CPAP   - HOB   - Aspiration precautions  - Keep SaO2 > 92%  - OOB   - IS  10x q 1 while awake     #Renal/Fluid/Electrolytes:- Acute on Chronic renal insuff   - Strict I/Os  - Repaet BMP after fluid bolus   - Keep euvolemic  - Keep normonatremic - 135- < 145   - Keep Magnesium level > 2  - Monitor lytes, replete as needed  - No durbin     #GI:-  GERD   - Home Protonix   - DASH and CCD   - Nl LFT's     #Heme/Onc: Anemiia  - SCS while in bed    #Endo: Type 2 DM/ Hypothyroid  - Add TSH and Free T4  - HISS - home Humalog 75/25  - Keep normoglycemic    #ID:  - Keep normothermic; avoid fevers    Code status: Full code  Disposition: ICU       The patient is a 74-year-old female with a PMHx of left trigger finger, AS, MR, osteoporosis, DM with Charcot foot, and HTN, who was evaluated in the neurosurgery outpatient office due to known focal severe left M1 Middle Cerebral Artery narrowing (confirmed on MRA 5/12/2025). She has had three prior strokes while on Aspirin and Plavix, however she was found to be subtherapeutic on Plavix and subsequently switched to Brilinta. She is also on Crestor 4mg and is s/p loop recorder placement. The patient had an MRA NOVA 5 /12/2025 and now presents for a diagnostic cerebral angiogram. NPO except medication after midnight last night. IV normal saline on arrival. Brilinta 90mg bid and Aspirin 81mg daily PO last dose this morning.      Plan:  Admit to neuro ICU s/p procedure     #Neuro:  - NIHSS per IR protocol - complete 1215   - DAPT Aspirin 81mg daily and Brilinta 90mg q 12 hours l  - PT/OT    #CV: HTN  - -140  - D/C stephanie   - Hold home meds since SBP     #Resp: Sleep apnea   - Non compliant with CPAP   - HOB   - Aspiration precautions  - Keep SaO2 > 92%  - OOB   - IS  10x q 1 while awake     #Renal/Fluid/Electrolytes:- Acute on Chronic renal insuff   - Strict I/Os  - Repaet BMP after fluid bolus   - Keep euvolemic  - Keep normonatremic - 135- < 145   - Keep Magnesium level > 2  - Monitor lytes, replete as needed  - No durbin     #GI:-  GERD   - Home Protonix   - DASH and CCD   - Nl LFT's     #Heme/Onc: Anemiia  - SCS while in bed    #Endo: Type 2 DM/ Hypothyroid  - Add TSH and Free T4  - HISS - home Humalog 75/25  - Keep normoglycemic    #ID:  - Keep normothermic; avoid fevers    Code status: Full code  Disposition: ICU       The patient is a 74-year-old female with a PMHx of left trigger finger, AS, MR, osteoporosis, DM with Charcot foot, and HTN, who was evaluated in the neurosurgery outpatient office due to known focal severe left M1 Middle Cerebral Artery narrowing (confirmed on MRA 5/12/2025). She has had three prior strokes while on Aspirin and Plavix, however she was found to be subtherapeutic on Plavix and subsequently switched to Brilinta. She is also on Crestor 4mg and is s/p loop recorder placement. The patient had an MRA NOVA 5 /12/2025 and now presents for a diagnostic cerebral angiogram. NPO except medication after midnight last night. IV normal saline on arrival. Brilinta 90mg bid and Aspirin 81mg daily PO last dose this morning.      POD #1  s/p left MCA stent x1 and balloon angioplasty via right radial arteriotomy.     #Neuro:  - NIHSS per IR protocol - complete 1215   - DAPT Aspirin 81mg daily and Brilinta 90mg q 12 hours l  - PT/OT    #CV: HTN  - -140  - D/C stehpanie   - Hold home meds since SBP     #Resp: Sleep apnea   - Non compliant with CPAP   - HOB   - Aspiration precautions  - Keep SaO2 > 92%  - OOB   - IS  10x q 1 while awake     #Renal/Fluid/Electrolytes:- Acute on Chronic renal insuff   - Strict I/Os  -Cr stablized 2.3 ( 2.4)  - Keep euvolemic  - LR at 50cc/hr   - BMP in am if Cr stable will D/C LR  - Keep normonatremic - 135- < 145   - Keep Magnesium level > 2  - Monitor lytes, replete as needed  - No durbin     #GI:-  GERD   - Home Protonix   - DASH and CCD   - Nl LFT's     #Heme/Onc: Anemia of Chronic Disease  - HGB stable   - SCS while in bed    #Endo: Type 2 DM/ Hypothyroid  - TSH- .02 and Free T4- 1.8 decrease dose 125mcg /day - F/U TSH and Free T4 in one mo.  - HISS - home Humalog 75/25  - Keep normoglycemic    #ID:  - Keep normothermic; avoid fevers    Code status: Full code  Disposition: ICU

## 2025-05-21 NOTE — CHART NOTE - NSCHARTNOTEFT_GEN_A_CORE
Implanted Device:   Neuroform Lewisberry without tip  4.5mm x 21mm   REF: [id number]  LOT: [id number]  Exp: 7/20/2029 5/20/2025 at 11:33

## 2025-05-21 NOTE — OCCUPATIONAL THERAPY INITIAL EVALUATION ADULT - LIVES WITH, PROFILE
pt recently had stroke and was in rehab until  ~2 weeks ago - prior ot that lives in pvt home alone but will stay with brother in pvt home with 3 SAUNDRA, no stairs inhome on d/c from hospital

## 2025-05-21 NOTE — PHYSICAL THERAPY INITIAL EVALUATION ADULT - PERTINENT HX OF CURRENT PROBLEM, REHAB EVAL
74-year-old female with a PMHx of left trigger finger, AS, MR, osteoporosis, DM with Charcot foot, and HTN, who was evaluated in the neurosurgery outpatient office due to known focal severe left M1 Middle Cerebral Artery narrowing (confirmed on MRA 5/12/2025). She has had three prior strokes while on Aspirin and Plavix, however she was found to be subtherapeutic on Plavix and subsequently switched to Brilinta. She is also s/p loop recorder placement. The patient had an MRA NOVA 5 /12/2025 and now presents for a diagnostic cerebral angiogram. NPO except medication after midnight last night. IV normal saline on arrival. Brilinta 90mg bid and Aspirin 81mg daily PO last dose this morning. (20 May 2025 09:48)

## 2025-05-21 NOTE — DISCHARGE NOTE PROVIDER - NPI NUMBER (FOR SYSADMIN USE ONLY) :
[[id number]] [[id number] [[id number]],[[id number]] [[id number]],[[id number]]

## 2025-05-21 NOTE — PROGRESS NOTE ADULT - SUBJECTIVE AND OBJECTIVE BOX
Neuroendovascular Progress Note:     HPI:  The patient is a 74-year-old female with a PMHx of left trigger finger, AS, MR, osteoporosis, DM with Charcot foot, and HTN, who was evaluated in the neurosurgery outpatient office due to known focal severe left M1 Middle Cerebral Artery narrowing (confirmed on MRA 5/12/2025). She has had three prior strokes while on Aspirin and Plavix, however she was found to be subtherapeutic on Plavix and subsequently switched to Brilinta. She is also s/p loop recorder placement. The patient had an MRA NOVA 5 /12/2025 and now presents for a diagnostic cerebral angiogram. NPO except medication after midnight last night. IV normal saline on arrival. Brilinta 90mg bid and Aspirin 81mg daily PO last dose this morning. (20 May 2025 09:48)    Interval History: POD 1 s/p diagnostic cerebral angiogram + MCA angioplasty and stenting x 1 with Dr Eden. No acute complaints on exam and the arteriotomy site is c/d/i with +ecchymosis proximal to the site. H/H     Past Medical History:  HTN (hypertension)  History of TIAs  Hypothyroid  Arthritis  DM (diabetes mellitus)  CORTEZ on CPAP  Stage 3 chronic kidney disease    24-Hour Events: POD 1 s/p diagnostic cerebral angiogram + intracranial angioplasty and stenting    Medication:  aspirin  chewable 81 milliGRAM(s) Oral daily  chlorhexidine 2% Cloths 1 Application(s) Topical <User Schedule>  dextrose 5%. 1000 milliLiter(s) IV Continuous <Continuous>  dextrose 5%. 1000 milliLiter(s) IV Continuous <Continuous>  dextrose 50% Injectable 25 Gram(s) IV Push once  dextrose 50% Injectable 25 Gram(s) IV Push once  gabapentin 400 milliGRAM(s) Oral daily  glucagon  Injectable 1 milliGRAM(s) IntraMuscular once  insulin lispro (ADMELOG) corrective regimen sliding scale   SubCutaneous three times a day before meals  insulin lispro Injectable (ADMELOG) 5 Unit(s) SubCutaneous three times a day before meals  levothyroxine 150 MICROGram(s) Oral daily  lisinopril 10 milliGRAM(s) Oral daily  pantoprazole    Tablet 40 milliGRAM(s) Oral daily  rosuvastatin 40 milliGRAM(s) Oral at bedtime  ticagrelor 90 milliGRAM(s) Oral every 12 hours    Allergies:  clindamycin (Rash)    Recent Vitals:  T(F): 97.2 (05-21-25 @ 12:00), Max: 97.2 (05-21-25 @ 12:00)  HR: 76 (05-21-25 @ 13:00) (70 - 87)  BP: 138/63 (05-21-25 @ 13:00) (114/48 - 138/63)  RR: 21 (05-21-25 @ 13:00) (13 - 29)  SpO2: 97% (05-21-25 @ 13:00) (88% - 100%)      Recent Labs:                        7.9    3.12  )-----------( 147      ( 21 May 2025 05:22 )             23.6     05-21    137  |  106  |  28[H]  ----------------------------<  328[H]  5.3[H]   |  23  |  2.3[H]    Ca    8.9      21 May 2025 11:14  Phos  4.1     05-21  Mg     2.1     05-21    TPro  5.0[L]  /  Alb  3.4[L]  /  TBili  0.3  /  DBili  x   /  AST  26  /  ALT  21  /  AlkPhos  114  05-21    PT/INR - ( 20 May 2025 13:24 )   PT: 12.40 sec;   INR: 1.05 ratio         PTT - ( 20 May 2025 13:24 )  PTT:>200.0 sec  Urinalysis Basic - ( 21 May 2025 11:14 )    Color: x / Appearance: x / SG: x / pH: x  Gluc: 328 mg/dL / Ketone: x  / Bili: x / Urobili: x   Blood: x / Protein: x / Nitrite: x   Leuk Esterase: x / RBC: x / WBC x   Sq Epi: x / Non Sq Epi: x / Bacteria: x      LIVER FUNCTIONS - ( 21 May 2025 05:22 )  Alb: 3.4 g/dL / Pro: 5.0 g/dL / ALK PHOS: 114 U/L / ALT: 21 U/L / AST: 26 U/L / GGT: x             Exam:  General: NAD  Neuro: AAOx3, following commands, moving all extremities antigravity without drift, sensation intact to light touch, no dysmetria on FTN or HTS, no dysarthria, no aphasia.    Radiology:   Pertinent radiological imaging has been reviewed. IR neuro report is to follow.    Assessment:   The patient is a 74-year-old female with a PMHx of left trigger finger, AS, MR, osteoporosis, DM with Charcot foot, and HTN, who was evaluated in the neurosurgery outpatient office due to known focal severe left M1 Middle Cerebral Artery narrowing (confirmed on MRA 5/12/2025). POD 1 s/p diagnostic cerebral angiogram + MCA angioplasty and stenting x 1 with Dr Eden. No acute complaints on exam and the arteriotomy site is c/d/i with +ecchymosis proximal to the site. H/H elevated this morning + Cr elevated post procedure (2.2 from 1.8). A1C also is 8.1.    Suggestions:  - Continue to monitor the right arm for signs of bleeding, increased swelling, and ischemia; monitor for infection   - Continue Aspirin 81mg daily and Brilinta 90mg q 12 hours  - Diabetes control (A1C 8.6)   - Continued monitoring of CBC abd BMP  - Plan for likely downgrade to the stroke unit today   - Follow up outpatient with Dr Eden after discharge  - Management by the neuro ICU  [id number] [facility] if there are acute changes in the patient's neurological status/exam or with additional questions or concerns

## 2025-05-21 NOTE — DISCHARGE NOTE PROVIDER - PROVIDER TOKENS
PROVIDER:[TOKEN:[[id number]]] PROVIDER:[TOKEN:[[id number]]],PROVIDER:[TOKEN:[[id number]],FOLLOWUP:[2 weeks]] PROVIDER:[TOKEN:[[id number]]],PROVIDER:[TOKEN:[[id number]],FOLLOWUP:[2 weeks]],PROVIDER:[TOKEN:[[id number]],FOLLOWUP:[2 weeks],ESTABLISHEDPATIENT:[T]],PROVIDER:[TOKEN:[[id number]],FOLLOWUP:[1 month]] PROVIDER:[TOKEN:[[id number]]],PROVIDER:[TOKEN:[[id number]],FOLLOWUP:[2 weeks]],PROVIDER:[TOKEN:[[id number]],FOLLOWUP:[2 weeks],ESTABLISHEDPATIENT:[T]],PROVIDER:[TOKEN:[[id number]],FOLLOWUP:[1 month]],PROVIDER:[TOKEN:[[id number]],FOLLOWUP:[1 month]]

## 2025-05-21 NOTE — OCCUPATIONAL THERAPY INITIAL EVALUATION ADULT - PERTINENT HX OF CURRENT PROBLEM, REHAB EVAL
74-year-old female with a PMHx of left trigger finger, AS, MR, osteoporosis, DM with Charcot foot, and HTN, who was evaluated in the neurosurgery outpatient office due to known focal severe left M1 Middle Cerebral Artery narrowing (confirmed on MRA 5/12/2025). She has had three prior strokes while on Aspirin and Plavix, however she was found to be subtherapeutic on Plavix and subsequently switched to Brilinta. She is also s/p loop recorder placement. The patient had an MRA NOVA 5 /12/2025 and now presents for a diagnostic cerebral angiogram. NPO except medication after midnight last night. IV normal saline on arrival. Brilinta 90mg bid and Aspirin 81mg daily PO last dose this morning. (20 May 2025 09:48)      INTERVAL EVENTS:   POD #1  s/p left MCA stent x1 and balloon angioplasty via right radial arteriotomy.

## 2025-05-21 NOTE — PHYSICAL THERAPY INITIAL EVALUATION ADULT - GAIT TRAINING, PT EVAL
will ambulate 100 ft with RW, modified I by d/c; will negotiate 1 FOS with 1 rail, CS by d/c will ambulate 100 ft with RW, modified I by d/c; will negotiate 3 steps with 1 rail, CS by d/c (will be staying in brother's house post d/c)

## 2025-05-21 NOTE — DISCHARGE NOTE PROVIDER - CARE PROVIDER_API CALL
Jared Eden  Neurosurgery  [address] [place] [address], [address]  [place] [address]  Phone: [phone]  Fax: [phone]  Follow Up Time:    Jared Eden  Neurosurgery  [address] [place] [address], [address]  [place] [address]  Phone: [phone]  Fax: [phone]  Follow Up Time:     Maritza Poe  Neurology  [address] [place] [address]  Phone: [phone]  Fax: [phone]  Follow Up Time: 2 weeks   Jared Eden  Neurosurgery  [address] [facility], [address]  [place] [address]  Phone: [phone]  Fax: [phone]  Follow Up Time:     Maritza Poe  Neurology  [address] [place] [address]  Phone: [phone]  Fax: [phone]  Follow Up Time: 2 weeks    Juan Graf  [address] [address]  Phone: [phone]  Fax: [phone]  Established Patient  Follow Up Time: 2 weeks    Rachel Barber  Endocrinology/Metab/Diabetes  [address] [place] [address], [address]  [place] [address]  Phone: [phone]  Fax: [phone]  Follow Up Time: 1 month   Jared Eden  Neurosurgery  [address] [facility], [address]  [place] [address]  Phone: [phone]  Fax: [phone]  Follow Up Time:     Maritza Poe  Neurology  [address] [place] [address]  Phone: [phone]  Fax: [phone]  Follow Up Time: 2 weeks    Juan Graf  [address] [address]  Phone: [phone]  Fax: [phone]  Established Patient  Follow Up Time: 2 weeks    Rachel Barber  Endocrinology/Metab/Diabetes  [address] [facility], [address]  [place] [address]  Phone: [phone]  Fax: [phone]  Follow Up Time: 1 month    Raheem Park  Hematology  [address] [place] [address]  Phone: [phone]  Fax: [phone]  Follow Up Time: 1 month

## 2025-05-21 NOTE — OCCUPATIONAL THERAPY INITIAL EVALUATION ADULT - REHAB POTENTIAL, OT EVAL
pt baseline for functional transfers and ADLs, no skilled OT needs while inpatient- DC OT DISPLAY PLAN FREE TEXT

## 2025-05-21 NOTE — DISCHARGE NOTE PROVIDER - CARE PROVIDERS DIRECT ADDRESSES
,[email or web address] ,[email or web address] ,[email or web address] ,[email or web address]

## 2025-05-21 NOTE — DISCHARGE NOTE PROVIDER - HOSPITAL COURSE
74-year-old female with a PMHx of left trigger finger, AS, MR, osteoporosis, DM with Charcot foot, and HTN, who was evaluated in the neurosurgery outpatient office due to known focal severe left M1 Middle Cerebral Artery narrowing (confirmed on MRA 5/12/2025). She has had three prior strokes while on Aspirin and Plavix, however she was found to be subtherapeutic on Plavix and subsequently switched to Brilinta. She is also s/p loop recorder placement. The patient had an MRA NOVA 5 /12/2025 and now presents for a diagnostic cerebral angiogram. NPO except medication after midnight last night. IV normal saline on arrival. Brilinta 90mg bid and Aspirin 81mg daily PO pre procedure.  The patient is now POD 1 s/p diagnostic cerebral angiogram and left MCA angioplasty and stenting with Dr Eden. She has no acute complaints on exam. The right radial arteriotomy site remains c/d/i.  H/H dropped 5/21 from 8.7/ 25.6 to 7.9/ 23.6.      Hospital course:  74y Female with PMH     During this hospital course, patient had a (ischemic/hemorrhagic) stroke located in (left/right.....) as seen on (MRI/CT).   The stroke etiology is likely secondary to:  []atrial fibrillation  []small vessel disease from atherosclerotic risk factors  []other:  []etiology workup still in progress    Patient had the following workup done in house:  CT Head:   MR Head Non Contrast:  CT Angio Head:  CT Angio Neck:  []echo  []labs  []other    Physical exam at discharge:    New medications on discharge:  Labs to be followed up:  Imaging to be done as outpatient:  Further outpatient workup:       Hospital course:  Patient is a 75 yo F w/PMHx of left trigger finger, AS, MR, osteoporosis, DM with Charcot foot, HTN, and three prior stroke s/p loop recorder placement who was evaluated in the neurosurgery outpatient office due to known focal severe left M1 Middle Cerebral Artery narrowing (confirmed on MRA 5/12/2025). She has had three prior strokes while on Aspirin and Plavix, however she was found to be subtherapeutic on Plavix and subsequently switched to Brilinta. She is also on Crestor 4mg and is s/p loop recorder placement. The patient had an MRA NOVA 5 /12/2025 and now s/p L MCA stent and balloon angioplasty POD#3. Her exam as been stable and she is now ready for discharge.    Discharge diagnosis:  Severe L MCA stenosis s/p L MCA stent and balloon angioplasty    Patient had the following workup done in house:  None    Physical exam at discharge:  <<<<<NEURO EXAM>>>>>  General: Appearance is consistent with chronologic age. No abnormal facies.  Cognitive/Language: The patient is oriented to person, place, time and date. Recent and remote memory intact. Language with normal repetition, comprehension and naming. Nondysarthric.    Eyes: VFF. Has chronic L eye abduction palsy but otherwise EOMI w/o nystagmus or reported double vision. PERRL. No ptosis/weakness of eyelid closure.    Face: Facial sensation normal V1 - 3, no facial asymmetry.    Ears/Nose/Throat: Hearing grossly intact b/l. Palate elevates midline. Tongue and uvula midline.   Motor examination: Normal tone. No tremors or involuntary movements.  Strength Exam (MRC scale): 5/5 BL UE and LE strength  Reflexes: 2+ b/l biceps, triceps, brachioradialis, patellar and Achilles reflexes. Plantar response downgoing b/l.  Sensory examination: Intact to light touch, temperature, and vibration in all extremities.  Cerebellum: FTN/HKS intact. No dysmetria or dysdiadochokinesia.    Gait: Able to walk with a walker, wide based    NIHSS: 0    New medications on discharge: c/w aspirin and brillinta  Labs to be followed up: None  Imaging to be done as outpatient: None  Further outpatient workup: Follow up with Dr. Eden on 5/30/2025, follow up in stroke clinic within 2 weeks       Hospital course:  Patient is a 73 yo F w/PMHx of left trigger finger, AS, MR, osteoporosis, DM with Charcot foot, HTN, and three prior stroke s/p loop recorder placement who was evaluated in the neurosurgery outpatient office due to known focal severe left M1 Middle Cerebral Artery narrowing (confirmed on MRA 5/12/2025). She has had three prior strokes while on Aspirin and Plavix, however she was found to be subtherapeutic on Plavix and subsequently switched to Brilinta. She is also on Crestor 4mg and is s/p loop recorder placement. The patient had an MRA NOVA 5 /12/2025 and now s/p L MCA stent and balloon angioplasty POD#3. Her exam as been stable and she is now ready for discharge.    Discharge diagnosis:  Severe L MCA stenosis s/p L MCA stent and balloon angioplasty    Patient had the following workup done in house:  None    Physical exam at discharge:  <<<<<NEURO EXAM>>>>>  General: Appearance is consistent with chronologic age. No abnormal facies.  Cognitive/Language: The patient is oriented to person, place, time and date. Recent and remote memory intact. Language with normal repetition, comprehension and naming. Nondysarthric.    Eyes: VFF. Has chronic L eye abduction palsy but otherwise EOMI w/o nystagmus or reported double vision. PERRL. No ptosis/weakness of eyelid closure.    Face: Facial sensation normal V1 - 3, no facial asymmetry.    Ears/Nose/Throat: Hearing grossly intact b/l. Palate elevates midline. Tongue and uvula midline.   Motor examination: Normal tone. No tremors or involuntary movements.  Strength Exam (MRC scale): 5/5 BL UE and LE strength  Reflexes: 2+ b/l biceps, triceps, brachioradialis, patellar and Achilles reflexes. Plantar response downgoing b/l.  Sensory examination: Intact to light touch, temperature, and vibration in all extremities.  Cerebellum: FTN/HKS intact. No dysmetria or dysdiadochokinesia.    Gait: Able to walk with a walker, wide based    NIHSS: 0    New medications on discharge: c/w aspirin and brillinta, start amlodipine 5 mg daily, decrease levothyroxine dose to 125 mcg daily   Labs to be followed up: None  Imaging to be done as outpatient: None  Further outpatient workup: Follow up with Dr. Eden on 5/30/2025, follow up in stroke clinic within 2 weeks, follow up with endocrinology within 4 weeks for repeat TSH and T4 levels     Hospital course:  Patient is a 75 yo F w/PMHx of left trigger finger, AS, MR, osteoporosis, DM with Charcot foot, HTN, and three prior stroke s/p loop recorder placement who was evaluated in the neurosurgery outpatient office due to known focal severe left M1 Middle Cerebral Artery narrowing (confirmed on MRA 5/12/2025). She has had three prior strokes while on Aspirin and Plavix, however she was found to be subtherapeutic on Plavix and subsequently switched to Brilinta. She is also on Crestor 4mg and is s/p loop recorder placement. The patient had an MRA NOVA 5 /12/2025 and now s/p L MCA stent and balloon angioplasty POD#3. Her exam as been stable and she is now ready for discharge.    Discharge diagnosis:  Severe L MCA stenosis s/p L MCA stent and balloon angioplasty    Patient had the following workup done in house:  None    Physical exam at discharge:  <<<<<NEURO EXAM>>>>>  General: Appearance is consistent with chronologic age. No abnormal facies.  Cognitive/Language: The patient is oriented to person, place, time and date. Recent and remote memory intact. Language with normal repetition, comprehension and naming. Nondysarthric.    Eyes: VFF. Has chronic L eye abduction palsy but otherwise EOMI w/o nystagmus or reported double vision. PERRL. No ptosis/weakness of eyelid closure.    Face: Facial sensation normal V1 - 3, no facial asymmetry.    Ears/Nose/Throat: Hearing grossly intact b/l. Palate elevates midline. Tongue and uvula midline.   Motor examination: Normal tone. No tremors or involuntary movements.  Strength Exam (MRC scale): 5/5 BL UE and LE strength  Reflexes: 2+ b/l biceps, triceps, brachioradialis, patellar and Achilles reflexes. Plantar response downgoing b/l.  Sensory examination: Intact to light touch, temperature, and vibration in all extremities.  Cerebellum: FTN/HKS intact. No dysmetria or dysdiadochokinesia.    Gait: Able to walk with a walker, wide based    NIHSS: 0    New medications on discharge: c/w aspirin and brillinta, start amlodipine 5 mg daily, decrease levothyroxine dose to 125 mcg daily   Labs to be followed up: None  Imaging to be done as outpatient: None  Further outpatient workup: Follow up with Dr. Eden on 5/30/2025, follow up in stroke clinic within 2 weeks, follow up with endocrinology within 4 weeks for repeat TSH and T4 levels, follow up with PCP within 2 weeks     Hospital course:  Patient is a 73 yo F w/PMHx of left trigger finger, AS, MR, osteoporosis, DM with Charcot foot, HTN, and three prior stroke s/p loop recorder placement who was evaluated in the neurosurgery outpatient office due to known focal severe left M1 Middle Cerebral Artery narrowing (confirmed on MRA 5/12/2025). She has had three prior strokes while on Aspirin and Plavix, however she was found to be subtherapeutic on Plavix and subsequently switched to Brilinta. She is also on Crestor 4mg and is s/p loop recorder placement. The patient had an MRA NOVA 5 /12/2025 and now s/p L MCA stent and balloon angioplasty POD#3. Her exam as been stable and she is now ready for discharge.    Discharge diagnosis:  Severe L MCA stenosis s/p L MCA stent and balloon angioplasty    Patient had the following workup done in house:  None    Physical exam at discharge:  <<<<<NEURO EXAM>>>>>  General: Appearance is consistent with chronologic age. No abnormal facies.  Cognitive/Language: The patient is oriented to person, place, time and date. Recent and remote memory intact. Language with normal repetition, comprehension and naming. Nondysarthric.    Eyes: VFF. Has chronic L eye abduction palsy but otherwise EOMI w/o nystagmus or reported double vision. PERRL. No ptosis/weakness of eyelid closure.    Face: Facial sensation normal V1 - 3, no facial asymmetry.    Ears/Nose/Throat: Hearing grossly intact b/l. Palate elevates midline. Tongue and uvula midline.   Motor examination: Normal tone. No tremors or involuntary movements.  Strength Exam (MRC scale): 5/5 BL UE and LE strength  Reflexes: 2+ b/l biceps, triceps, brachioradialis, patellar and Achilles reflexes. Plantar response downgoing b/l.  Sensory examination: Intact to light touch, temperature, and vibration in all extremities.  Cerebellum: FTN/HKS intact. No dysmetria or dysdiadochokinesia.    Gait: Able to walk with a walker, wide based    NIHSS: 0    New medications on discharge: c/w aspirin and brillinta, start amlodipine 5 mg daily, decrease levothyroxine dose to 125 mcg daily   Labs to be followed up: None  Imaging to be done as outpatient: None  Further outpatient workup: Follow up with Dr. Eden on 5/30/2025, follow up in stroke clinic within 2 weeks, follow up with endocrinology within 4 weeks for repeat TSH and T4 levels, follow up with PCP within 2 weeks, follow up with hematology within 4 weeks for your anemia.     Hospital course:  Patient is a 73 yo F w/PMHx of left trigger finger, AS, MR, osteoporosis, DM with Charcot foot, HTN, and three prior stroke s/p loop recorder placement who was evaluated in the neurosurgery outpatient office due to known focal severe left M1 Middle Cerebral Artery narrowing (confirmed on MRA 5/12/2025). She has had three prior strokes while on Aspirin and Plavix, however she was found to be subtherapeutic on Plavix and subsequently switched to Brilinta. She is also on Crestor 4mg and is s/p loop recorder placement. The patient had an MRA NOVA 5 /12/2025 and now s/p L MCA stent and balloon angioplasty POD#3. Her exam as been stable and she is now ready for discharge.    Discharge diagnosis:  Severe L MCA stenosis s/p L MCA stent and balloon angioplasty    Patient had the following workup done in house:  None    Physical exam at discharge:  <<<<<NEURO EXAM>>>>>  General: Appearance is consistent with chronologic age. No abnormal facies.  Cognitive/Language: The patient is oriented to person, place, time and date. Recent and remote memory intact. Language with normal repetition, comprehension and naming. Nondysarthric.    Eyes: VFF. Has chronic L eye abduction palsy but otherwise EOMI w/o nystagmus or reported double vision. PERRL. No ptosis/weakness of eyelid closure.    Face: Facial sensation normal V1 - 3, no facial asymmetry.    Ears/Nose/Throat: Hearing grossly intact b/l. Palate elevates midline. Tongue and uvula midline.   Motor examination: Normal tone. No tremors or involuntary movements.  Strength Exam (MRC scale): 5/5 BL UE and LE strength  Reflexes: 2+ b/l biceps, triceps, brachioradialis, patellar and Achilles reflexes. Plantar response downgoing b/l.  Sensory examination: Intact to light touch, temperature, and vibration in all extremities.  Cerebellum: FTN/HKS intact. No dysmetria or dysdiadochokinesia.    Gait: Able to walk with a walker, wide based    NIHSS: 0    New medications on discharge: c/w aspirin and brillinta, start amlodipine 5 mg daily, decrease levothyroxine dose to 125 mcg daily   Labs to be followed up: B12, Folate  Imaging to be done as outpatient: None  Further outpatient workup: Follow up with Dr. Eden on 5/30/2025, follow up in stroke clinic within 2 weeks, follow up with endocrinology within 4 weeks for repeat TSH and T4 levels, follow up with PCP within 2 weeks, follow up with hematology within 4 weeks for your anemia.     Hospital course:  Patient is a 73 yo F w/PMHx of left trigger finger, AS, MR, osteoporosis, DM with Charcot foot, HTN, and three prior stroke s/p loop recorder placement who was evaluated in the neurosurgery outpatient office due to known focal severe left M1 Middle Cerebral Artery narrowing (confirmed on MRA 5/12/2025). She has had three prior strokes while on Aspirin and Plavix, however she was found to be subtherapeutic on Plavix and subsequently switched to Brilinta. She is also on Crestor 4mg and is s/p loop recorder placement. The patient had an MRA NOVA 5 /12/2025 and now s/p L MCA stent and balloon angioplasty POD#3. Her exam as been stable and she is now ready for discharge.    Discharge diagnosis:  Severe L MCA stenosis s/p L MCA stent and balloon angioplasty    Patient had the following workup done in house:  None    Physical exam at discharge:  <<<<<NEURO EXAM>>>>>  General: Appearance is consistent with chronologic age. No abnormal facies.  Cognitive/Language: The patient is oriented to person, place, time and date. Recent and remote memory intact. Language with normal repetition, comprehension and naming. Nondysarthric.    Eyes: VFF. Has chronic L eye abduction palsy but otherwise EOMI w/o nystagmus or reported double vision. PERRL. No ptosis/weakness of eyelid closure.    Face: Facial sensation normal V1 - 3, no facial asymmetry.    Ears/Nose/Throat: Hearing grossly intact b/l. Palate elevates midline. Tongue and uvula midline.   Motor examination: Normal tone. No tremors or involuntary movements.  Strength Exam (MRC scale): 5/5 BL UE and LE strength  Reflexes: 2+ b/l biceps, triceps, brachioradialis, patellar and Achilles reflexes. Plantar response downgoing b/l.  Sensory examination: Intact to light touch, temperature, and vibration in all extremities.  Cerebellum: FTN/HKS intact. No dysmetria or dysdiadochokinesia.    Gait: Able to walk with a walker, wide based    NIHSS: 0    New medications on discharge: c/w aspirin and brillinta, start amlodipine 5 mg daily, decrease levothyroxine dose to 125 mcg daily   Labs to be followed up: B12, Folate  Imaging to be done as outpatient: None  Further outpatient workup: Follow up with Dr. Eden on 5/30/2025, follow up in stroke clinic within 2 weeks, follow up with endocrinology within 4 weeks for repeat TSH and T4 levels, follow up with PCP within 2 weeks, follow up with hematology within 4 weeks for your anemia.    Stroke attending attestation:  Pt is a 73 yo F with PMHx of HTN, HLD, DM, CORTEZ (noncompliant with CPAP), remote smoker, aortic stenosis, left lentiform nucleus/corona radiata small scattered strokes (03/2025) due to L MCA focal severe stenosis, who presented for elective L MCA stenting    Impr: S/p Left MCA angioplasty and stenting 5/20  Cr and Hb stable  Optimize glucose and BP control  Continue ASA/brilinta/statin  D/c home with outpt stroke and CAREY f/u

## 2025-05-21 NOTE — DISCHARGE NOTE PROVIDER - NSDCFUADDAPPT_GEN_ALL_CORE_FT
Please call the endocrinologist for an appointment (Dr. Barber's office).  Please call Dr. Graf for an appointment within 2 weeks. Please call the endocrinologist for an appointment (Dr. Barber's office).  Please call Dr. Graf for an appointment within 2 weeks.  Please call the hematologist for an appointment regarding your anemia

## 2025-05-21 NOTE — OCCUPATIONAL THERAPY INITIAL EVALUATION ADULT - GENERAL OBSERVATIONS, REHAB EVAL
Pt received semi tobar in bed in NAD, + tele, +BP cuff, =pulse oxi, + O2 via NC, removed by RN and O2 sat stable throughout session, + L wrist A line, R access for DSA, left seated in b/s chair in NAd, vitals stable and SBP within parameters throughout

## 2025-05-21 NOTE — PHYSICAL THERAPY INITIAL EVALUATION ADULT - GENERAL OBSERVATIONS, REHAB EVAL
[id number] pm. 73 y/o  rec'd/left in b/s chair, nad, + tele. pt is A+Ox4, has h/o decreased depth perception 2/2 eye misalignment. pt ambulated 80 ft with RW, CS. vitals: before ambulation 138/63 77 99% on RA., post ambulation 173/71 87 96%.

## 2025-05-21 NOTE — CHART NOTE - NSCHARTNOTEFT_GEN_A_CORE
Transfer from: [facility]    Transfer to: Stroke Unit    Accepting Physician: Dr. Poe     HPI:  74-year-old female with a PMHx of left trigger finger, AS, MR, osteoporosis, DM with Charcot foot, and HTN, who was evaluated in the neurosurgery outpatient office due to known focal severe left M1 Middle Cerebral Artery narrowing (confirmed on MRA 5/12/2025). She has had three prior strokes while on Aspirin and Plavix, however she was found to be subtherapeutic on Plavix and subsequently switched to Brilinta. She is also s/p loop recorder placement. The patient had an MRA NOVA 5 /12/2025 and now presents for a diagnostic cerebral angiogram. NPO except medication after midnight last night. IV normal saline on arrival. Brilinta 90mg bid and Aspirin 81mg daily PO last dose this morning.     INTERVAL EVENTS:     No intra-procedure complications.   Post-NIHSS = 0    ADMISSION SCORES:  GCS: 15  NIHSS: 0    [facility] Hospital Course: Patient admitted to [facility] s/p left MCA stent x1 and balloon angioplasty via right radial arteriotomy yesterday (5/20/25). While in here patient 250mL ns bolus overnight for hypotension, with good effect. Patient has been neurologically and hemodynamically stable for downgrade to stroke unit, 5/21 pm.      NIHSS 0 at this time, neurological exam intact.     ASSESSMENT & PLAN:   74-year-old female with a PMHx of left trigger finger, AS, MR, osteoporosis, DM with Charcot foot, and HTN, who was evaluated in the neurosurgery outpatient office due to known focal severe left M1 Middle Cerebral Artery narrowing (confirmed on MRA 5/12/2025). She has had three prior strokes while on Aspirin and Plavix, however she was found to be subtherapeutic on Plavix and subsequently switched to Brilinta. She is also on Crestor 4mg and is s/p loop recorder placement. The patient had an MRA NOVA 5 /12/2025 and now presents for a diagnostic cerebral angiogram. NPO except medication after midnight last night. IV normal saline on arrival. Brilinta 90mg bid and Aspirin 81mg daily PO last dose this morning.    POD #1  s/p left MCA stent x1 and balloon angioplasty via right radial arteriotomy.     #Neuro:  - NIHSS checks q4h  - DAPT Aspirin 81mg daily and Brilinta 90mg q 12 hours   - PT/OT    #CV: HTN  - -140  - D/C stephanie   - Hold home meds since SBP     #Resp: Sleep apnea   - Non compliant with CPAP   - HOB   - Aspiration precautions  - Keep SaO2 > 92%  - OOB   - IS  10x q 1 while awake     #Renal/Fluid/Electrolytes:- Acute on Chronic renal insuff   - Strict I/Os  -Cr stablized 2.3 ( 2.4)  - Keep euvolemic  - LR at 50cc/hr   - BMP in am if Cr stable will D/C LR  - Keep normonatremic - 135- < 145   - Keep Magnesium level > 2  - Monitor lytes, replete as needed  - No gifty     #GI:-  GERD   - Home Protonix   - DASH and CCD   - Nl LFT's     #Heme/Onc: Anemia of Chronic Disease  - HGB stable   - SCS while in bed    #Endo: Type 2 DM/ Hypothyroid  - TSH- .02 and Free T4- 1.8 decrease dose 125mcg /day - F/U TSH and Free T4 in one mo.  - HISS - home Humalog 75/25  - Start Lispro 7u w/meals TID and Lantus 20u qHs  - Keep normoglycemic    #ID:  - Keep normothermic; avoid fevers    --------------------------------------------------------------------------------------------------------------------------------  FOR FOLLOW UP:  [ ] BMP in am if creat stable d/c IVF  [ ] BS control   [ ] TSH and Free T4 in a month s/p synthroid 150mcg ->125  [ ] Restart Home antiHTN when appropriated

## 2025-05-21 NOTE — PROGRESS NOTE ADULT - SUBJECTIVE AND OBJECTIVE BOX
SUMMARY:HPI:  The patient is a 74-year-old female with a PMHx of left trigger finger, AS, MR, osteoporosis, DM with Charcot foot, and HTN, who was evaluated in the neurosurgery outpatient office due to known focal severe left M1 Middle Cerebral Artery narrowing (confirmed on MRA 5/12/2025). She has had three prior strokes while on Aspirin and Plavix, however she was found to be subtherapeutic on Plavix and subsequently switched to Brilinta. She is also s/p loop recorder placement. The patient had an MRA NOVA 5 /12/2025 and now presents for a diagnostic cerebral angiogram. NPO except medication after midnight last night. IV normal saline on arrival. Brilinta 90mg bid and Aspirin 81mg daily PO last dose this morning. (20 May 2025 09:48)      INTERVAL EVENTS:   POD #1  s/p left MCA stent x1 and balloon angioplasty via right radial arteriotomy.   No intra-procedure complications.   Post-NIHSS = 0    ADMISSION SCORES:  GCS: 15  NIHSS: 0    OVERNIGHT EVENTS:     Allergies    clindamycin (Rash)    Intolerances        REVIEW OF SYSTEMS:    [ X] All ROS addressed below are non-contributory, except:  Neuro: [ ] Headache [ ] Back pain [ ] Numbness [ ] Weakness [ ] Ataxia [ ] Dizziness [ ] Aphasia [ ] Dysarthria [ ] Visual disturbance  Resp: [ ] Shortness of breath/dyspnea, [ ] Orthopnea [ ] Cough  CV: [ ] Chest pain [ ] Palpitation [ ] Lightheadedness [ ] Syncope  Renal: [ ] Thirst [ ] Edema  GI: [ ] Nausea [ ] Emesis [ ] Abdominal pain [ ] Constipation [ ] Diarrhea  Hem: [ ] Hematemesis [ ] bright red blood per rectum  ID: [ ] Fever [ ] Chills [ ] Dysuria  ENT: [ ] Rhinorrhea    DEVICES:   [ ] PIV [ ] ET tube [ ] arterial line [ ] durbin    VITALS: [ ] Reviewed  Vital Signs Last 24 Hrs  T(C): 35.9 (21 May 2025 04:00), Max: 36.1 (20 May 2025 09:23)  T(F): 96.7 (21 May 2025 04:00), Max: 97 (20 May 2025 09:23)  HR: 84 (21 May 2025 07:00) (67 - 87)  BP: 139/60 (20 May 2025 09:35) (139/60 - 139/60)  RR: 26 (21 May 2025 07:00) (13 - 29)  SpO2: 99% (21 May 2025 07:00) (88% - 100%)    Parameters below as of 21 May 2025 06:00  Patient On (Oxygen Delivery Method): nasal cannula  O2 Flow (L/min): 2        20 May 2025 07:01  -  21 May 2025 07:00  --------------------------------------------------------  IN:    Oral Fluid: 350 mL    sodium chloride 0.9%: 200 mL    Sodium Chloride 0.9% Bolus: 250 mL  Total IN: 800 mL    OUT:    Voided (mL): 1300 mL  Total OUT: 1300 mL    Total NET: -500 mL    MEDICATIONS  (STANDING):  aspirin  chewable 81 milliGRAM(s) Oral daily  chlorhexidine 2% Cloths 1 Application(s) Topical <User Schedule>  dextrose 5%. 1000 milliLiter(s) (50 mL/Hr) IV Continuous <Continuous>  dextrose 5%. 1000 milliLiter(s) (100 mL/Hr) IV Continuous <Continuous>  dextrose 50% Injectable 25 Gram(s) IV Push once  dextrose 50% Injectable 25 Gram(s) IV Push once  gabapentin 400 milliGRAM(s) Oral daily  glucagon  Injectable 1 milliGRAM(s) IntraMuscular once  insulin lispro (ADMELOG) corrective regimen sliding scale   SubCutaneous three times a day before meals  levothyroxine 150 MICROGram(s) Oral daily  lisinopril 10 milliGRAM(s) Oral daily  pantoprazole  Injectable 40 milliGRAM(s) IV Push daily  rosuvastatin 40 milliGRAM(s) Oral at bedtime  ticagrelor 90 milliGRAM(s) Oral every 12 hours        CAPILLARY BLOOD GLUCOSE      POCT Blood Glucose.: 245 mg/dL (21 May 2025 06:01)  POCT Blood Glucose.: 201 mg/dL (20 May 2025 17:10)  POCT Blood Glucose.: 93 mg/dL (20 May 2025 13:03)  POCT Blood Glucose.: 179 mg/dL (20 May 2025 09:42)        LABS:  PT/INR - ( 20 May 2025 13:24 )   PT: 12.40 sec;   INR: 1.05 ratio         PTT - ( 20 May 2025 13:24 )  PTT:>200.0 sec  05-21    141  |  106  |  30[H]  ----------------------------<  254[H]  4.9   |  25  |  2.4[H]    Ca    8.7      21 May 2025 05:22  Phos  4.1     05-21  Mg     2.1     05-21    TPro  5.0[L]  /  Alb  3.4[L]  /  TBili  0.3  /  DBili  x   /  AST  26  /  ALT  21  /  AlkPhos  114  05-21                          7.9    3.12  )-----------( 147      ( 21 May 2025 05:22 )             23.6       STROKE CORE MEASURES:   HGBA1C  LDL  Trig  TSH and Free T4       IMAGING/DATA: [ X] Reviewed     MR Angio Head w/wo IV Cont (05.12.25 @ 16:25) >  IMPRESSION:    Redemonstrated focal severe stenosis in the left M1 MCA, and P1/P2   junction of the left PCA.    Short segment mildstenosis in the right A2 DIVINE is again noted.    Incidental subcentimeter diverticulum in the left jugular bulb.    MRA NOVA flow rates as above.    Xray Chest 1 View- PORTABLE-Urgent (Xray Chest 1 View- PORTABLE-Urgent .) (03.23.25 @ 23:52) >  IMPRESSION: Low lung volume.     MR Head No Cont (03.21.25 @ 08:08) >  IMPRESSION:    1.  Acute left corona radiata/basal ganglia/p posterior limb/internal   capsule infarct    2.  Chronic right corona radiata infarct    3.  Moderate microvascular ischemic changes.    4.  Focal area left MCA correspond to severe stenosis.    < end of copied text >      Right upper lung field opacity.         CT Angio Neck Stroke Protocol w/ IV Cont (03.20.25 @ 18:30) >  IMPRESSION:    CT PERFUSION:  No perfusion deficits to suggest areas of completed infarction or at risk   territory.    CTA HEAD/NECK:  Near complete stenosis of the left M1 MCA with normal flow seen distally.    Moderate focal stenosis of the A2 segment of the right DIVINE.    Other areas of atherosclerotic disease described above.      EXAMINATION:  PHYSICAL EXAM:    Constitutional: No Acute Distress     Neurological: Awake, alert oriented to person, place and time, Following Commands, PERRL, EOMI, No Gaze Preference, R Face ASymmetry    Motor exam:          Upper extremity                         Delt     Bicep     Tricep    HG                                                 R                                                      L          5/5 5/5 5/5       5/5          Lower extremity                        HF         KF        KE       DF         PF                                                  R                                                      L         5/5 5/5 5/5 5/5 5/5                                                 Sensation: [ ] intact to light touch  [ ] decreased:      Pulmonary: Clear to Auscultation, No rales, No rhonchi, No wheezes     Cardiovascular: S1, S2, Regular rate and rhythm     Gastrointestinal: Soft, Non-tender, Non-distended     Extremities: No calf tenderness        SUMMARY:HPI:  The patient is a 74-year-old female with a PMHx of left trigger finger, AS, MR, osteoporosis, DM type  with Charcot foot, and HTN, who was evaluated in the neurosurgery outpatient office due to known focal severe left M1 Middle Cerebral Artery narrowing (confirmed on MRA 5/12/2025). She has had three prior strokes while on Aspirin and Plavix, however she was found to be subtherapeutic on Plavix and subsequently switched to Brilinta. She is also s/p loop recorder placement. The patient had an MRA NOVA 5 /12/2025 and now presents for a diagnostic cerebral angiogram. NPO except medication after midnight last night. IV normal saline on arrival. Brilinta 90mg bid and Aspirin 81mg daily PO last dose this morning. (20 May 2025 09:48)      INTERVAL EVENTS:   POD #1  s/p left MCA stent x1 and balloon angioplasty via right radial arteriotomy.   No intra-procedure complications.   Post-NIHSS = 0    ADMISSION SCORES:  GCS: 15  NIHSS: 0    OVERNIGHT EVENTS: Bolus 250cc x1 for hypotension     Allergies    clindamycin (Rash)    Intolerances        REVIEW OF SYSTEMS:    [ X] All ROS addressed below are non-contributory, except:  Neuro: [ ] Headache [ ] Back pain [ ] Numbness [ ] Weakness [ ] Ataxia [ ] Dizziness [ ] Aphasia [ ] Dysarthria [ ] Visual disturbance  Resp: [ ] Shortness of breath/dyspnea, [ ] Orthopnea [ ] Cough  CV: [ ] Chest pain [ ] Palpitation [ ] Lightheadedness [ ] Syncope  Renal: [ ] Thirst [ ] Edema  GI: [ ] Nausea [ ] Emesis [ ] Abdominal pain [ ] Constipation [ ] Diarrhea  Hem: [ ] Hematemesis [ ] bright red blood per rectum  ID: [ ] Fever [ ] Chills [ ] Dysuria  ENT: [ ] Rhinorrhea    DEVICES:   [ ] PIV     VITALS: [x ] Reviewed  Vital Signs Last 24 Hrs  T(C): 35.9 (21 May 2025 04:00), Max: 36.1 (20 May 2025 09:23)  T(F): 96.7 (21 May 2025 04:00), Max: 97 (20 May 2025 09:23)  HR: 84 (21 May 2025 07:00) (67 - 87)  BP: 139/60 (20 May 2025 09:35) (139/60 - 139/60)  RR: 26 (21 May 2025 07:00) (13 - 29)  SpO2: 99% (21 May 2025 07:00) (88% - 100%)    Parameters below as of 21 May 2025 06:00  Patient On (Oxygen Delivery Method): nasal cannula  O2 Flow (L/min): 2        20 May 2025 07:01  -  21 May 2025 07:00  --------------------------------------------------------  IN:    Oral Fluid: 350 mL    sodium chloride 0.9%: 200 mL    Sodium Chloride 0.9% Bolus: 250 mL  Total IN: 800 mL    OUT:    Voided (mL): 1300 mL  Total OUT: 1300 mL    Total NET: -500 mL    MEDICATIONS  (STANDING):  aspirin  chewable 81 milliGRAM(s) Oral daily  chlorhexidine 2% Cloths 1 Application(s) Topical <User Schedule>  dextrose 5%. 1000 milliLiter(s) (50 mL/Hr) IV Continuous <Continuous>  dextrose 5%. 1000 milliLiter(s) (100 mL/Hr) IV Continuous <Continuous>  dextrose 50% Injectable 25 Gram(s) IV Push once  dextrose 50% Injectable 25 Gram(s) IV Push once  gabapentin 400 milliGRAM(s) Oral daily  glucagon  Injectable 1 milliGRAM(s) IntraMuscular once  insulin lispro (ADMELOG) corrective regimen sliding scale   SubCutaneous three times a day before meals  levothyroxine 150 MICROGram(s) Oral daily  lisinopril 10 milliGRAM(s) Oral daily  pantoprazole  Injectable 40 milliGRAM(s) po Push daily  rosuvastatin 40 milliGRAM(s) Oral at bedtime  ticagrelor 90 milliGRAM(s) Oral every 12 hours        CAPILLARY BLOOD GLUCOSE  POCT Blood Glucose.: 245 mg/dL (21 May 2025 06:01)  POCT Blood Glucose.: 201 mg/dL (20 May 2025 17:10)  POCT Blood Glucose.: 93 mg/dL (20 May 2025 13:03)  POCT Blood Glucose.: 179 mg/dL (20 May 2025 09:42)        LABS:  PT/INR - ( 20 May 2025 13:24 )   PT: 12.40 sec;   INR: 1.05 ratio    PTT - ( 20 May 2025 13:24 )  PTT:>200.0 sec        05-21    141  |  106  |  30[H]  ----------------------------<  254[H]  4.9   |  25  |  2.4[H] ( 1.8)     Ca    8.7      21 May 2025 05:22  Phos  4.1     05-21  Mg     2.1     05-21    TPro  5.0[L]  /  Alb  3.4[L]  /  TBili  0.3  /  DBili  x   /  AST  26  /  ALT  21  /  AlkPhos  114  05-21                                  7.9  ( 9.6)    3.12 ( 3.9 )   )-----------( 147 ( 174 )     ( 21 May 2025 05:22 )                        23.6       STROKE CORE MEASURES:   HGBA1C- 8.1  LDL- 45  Trig- 191  TSH- < 0  and Free T4- P        IMAGING/DATA: [ X] Reviewed     MR Angio Head w/wo IV Cont (05.12.25 @ 16:25) >  IMPRESSION:    Redemonstrated focal severe stenosis in the left M1 MCA, and P1/P2   junction of the left PCA.    Short segment mildstenosis in the right A2 DIVINE is again noted.    Incidental subcentimeter diverticulum in the left jugular bulb.    MRA NOVA flow rates as above.    Xray Chest 1 View- PORTABLE-Urgent (Xray Chest 1 View- PORTABLE-Urgent .) (03.23.25 @ 23:52) >  IMPRESSION: Low lung volume.     MR Head No Cont (03.21.25 @ 08:08) >  IMPRESSION:    1.  Acute left corona radiata/basal ganglia/p posterior limb/internal   capsule infarct    2.  Chronic right corona radiata infarct    3.  Moderate microvascular ischemic changes.    4.  Focal area left MCA correspond to severe stenosis.    < end of copied text >      Right upper lung field opacity.         CT Angio Neck Stroke Protocol w/ IV Cont (03.20.25 @ 18:30) >  IMPRESSION:    CT PERFUSION:  No perfusion deficits to suggest areas of completed infarction or at risk   territory.    CTA HEAD/NECK:  Near complete stenosis of the left M1 MCA with normal flow seen distally.    Moderate focal stenosis of the A2 segment of the right DIVINE.    Other areas of atherosclerotic disease described above.      EXAMINATION:  PHYSICAL EXAM:    Constitutional: No Acute Distress     Neurological: Awake, alert oriented to person, place and time, Following Commands, PERRL, EOMI, No Gaze Preference, R Face ASymmetry    Motor exam:          Upper extremity                         Delt     Bicep     Tricep    HG                                                 R                                                      L          5/5        5/5        5/5       5/5          Lower extremity                        HF         KF        KE       DF         PF                                                  R                                                      L         5/5        5/5       5/5       5/5          5/5                                                 Sensation: [ ] intact to light touch  [ ] decreased:      Pulmonary: Clear to Auscultation, No rales, No rhonchi, No wheezes     Cardiovascular: S1, S2, Regular rate and rhythm     Gastrointestinal: Soft, Non-tender, Non-distended     Extremities: No calf tenderness        SUMMARY:HPI:  The patient is a 74-year-old female with a PMHx of left trigger finger, AS, MR, osteoporosis, DM type  with Charcot foot, and HTN, who was evaluated in the neurosurgery outpatient office due to known focal severe left M1 Middle Cerebral Artery narrowing (confirmed on MRA 5/12/2025). She has had three prior strokes while on Aspirin and Plavix, however she was found to be subtherapeutic on Plavix and subsequently switched to Brilinta. She is also s/p loop recorder placement. The patient had an MRA NOVA 5 /12/2025 and now presents for a diagnostic cerebral angiogram. NPO except medication after midnight last night. IV normal saline on arrival. Brilinta 90mg bid and Aspirin 81mg daily PO last dose this morning. (20 May 2025 09:48)      INTERVAL EVENTS:     No intra-procedure complications.   Post-NIHSS = 0    ADMISSION SCORES:  GCS: 15  NIHSS: 0    OVERNIGHT EVENTS: Bolus 250cc x1 for hypotension     Allergies    clindamycin (Rash)    Intolerances        REVIEW OF SYSTEMS:    [ X] All ROS addressed below are non-contributory, except:  Neuro: [ ] Headache [ ] Back pain [ ] Numbness [ ] Weakness [ ] Ataxia [ ] Dizziness [ ] Aphasia [ ] Dysarthria [ ] Visual disturbance  Resp: [ ] Shortness of breath/dyspnea, [ ] Orthopnea [ ] Cough  CV: [ ] Chest pain [ ] Palpitation [ ] Lightheadedness [ ] Syncope  Renal: [ ] Thirst [ ] Edema  GI: [ ] Nausea [ ] Emesis [ ] Abdominal pain [ ] Constipation [ ] Diarrhea  Hem: [ ] Hematemesis [ ] bright red blood per rectum  ID: [ ] Fever [ ] Chills [ ] Dysuria  ENT: [ ] Rhinorrhea    DEVICES:   [ ] PIV     VITALS: [x ] Reviewed  Vital Signs Last 24 Hrs  T(C): 35.9 (21 May 2025 04:00), Max: 36.1 (20 May 2025 09:23)  T(F): 96.7 (21 May 2025 04:00), Max: 97 (20 May 2025 09:23)  HR: 84 (21 May 2025 07:00) (67 - 87)  BP: 139/60 (20 May 2025 09:35) (139/60 - 139/60)  RR: 26 (21 May 2025 07:00) (13 - 29)  SpO2: 99% (21 May 2025 07:00) (88% - 100%)    Parameters below as of 21 May 2025 06:00  Patient On (Oxygen Delivery Method): nasal cannula  O2 Flow (L/min): 2        20 May 2025 07:01  -  21 May 2025 07:00  --------------------------------------------------------  IN:    Oral Fluid: 350 mL    sodium chloride 0.9%: 200 mL    Sodium Chloride 0.9% Bolus: 250 mL  Total IN: 800 mL    OUT:    Voided (mL): 1300 mL  Total OUT: 1300 mL    Total NET: -500 mL    MEDICATIONS  (STANDING):  aspirin  chewable 81 milliGRAM(s) Oral daily  chlorhexidine 2% Cloths 1 Application(s) Topical <User Schedule>  dextrose 5%. 1000 milliLiter(s) (50 mL/Hr) IV Continuous <Continuous>  dextrose 5%. 1000 milliLiter(s) (100 mL/Hr) IV Continuous <Continuous>  dextrose 50% Injectable 25 Gram(s) IV Push once  dextrose 50% Injectable 25 Gram(s) IV Push once  gabapentin 400 milliGRAM(s) Oral daily  glucagon  Injectable 1 milliGRAM(s) IntraMuscular once  insulin lispro (ADMELOG) corrective regimen sliding scale   SubCutaneous three times a day before meals  levothyroxine 150 MICROGram(s) Oral daily  lisinopril 10 milliGRAM(s) Oral daily  pantoprazole  Injectable 40 milliGRAM(s) po Push daily  rosuvastatin 40 milliGRAM(s) Oral at bedtime  ticagrelor 90 milliGRAM(s) Oral every 12 hours        CAPILLARY BLOOD GLUCOSE  POCT Blood Glucose.: 245 mg/dL (21 May 2025 06:01)  POCT Blood Glucose.: 201 mg/dL (20 May 2025 17:10)  POCT Blood Glucose.: 93 mg/dL (20 May 2025 13:03)  POCT Blood Glucose.: 179 mg/dL (20 May 2025 09:42)        LABS:  PT/INR - ( 20 May 2025 13:24 )   PT: 12.40 sec;   INR: 1.05 ratio    PTT - ( 20 May 2025 13:24 )  PTT:>200.0 sec        05-21    141  |  106  |  30[H]  ----------------------------<  254[H]  4.9   |  25  |  2.4[H] ( 1.8)     Ca    8.7      21 May 2025 05:22  Phos  4.1     05-21  Mg     2.1     05-21    TPro  5.0[L]  /  Alb  3.4[L]  /  TBili  0.3  /  DBili  x   /  AST  26  /  ALT  21  /  AlkPhos  114  05-21                                  7.9  ( 9.6)    3.12 ( 3.9 )   )-----------( 147 ( 174 )     ( 21 May 2025 05:22 )                        23.6       STROKE CORE MEASURES:   HGBA1C- 8.1  LDL- 45  Trig- 191  TSH- < 0  and Free T4- P        IMAGING/DATA: [ X] Reviewed     MR Angio Head w/wo IV Cont (05.12.25 @ 16:25) >  IMPRESSION:    Redemonstrated focal severe stenosis in the left M1 MCA, and P1/P2   junction of the left PCA.    Short segment mildstenosis in the right A2 DIVINE is again noted.    Incidental subcentimeter diverticulum in the left jugular bulb.    MRA NOVA flow rates as above.    Xray Chest 1 View- PORTABLE-Urgent (Xray Chest 1 View- PORTABLE-Urgent .) (03.23.25 @ 23:52) >  IMPRESSION: Low lung volume.     MR Head No Cont (03.21.25 @ 08:08) >  IMPRESSION:    1.  Acute left corona radiata/basal ganglia/p posterior limb/internal   capsule infarct    2.  Chronic right corona radiata infarct    3.  Moderate microvascular ischemic changes.    4.  Focal area left MCA correspond to severe stenosis.    < end of copied text >      Right upper lung field opacity.         CT Angio Neck Stroke Protocol w/ IV Cont (03.20.25 @ 18:30) >  IMPRESSION:    CT PERFUSION:  No perfusion deficits to suggest areas of completed infarction or at risk   territory.    CTA HEAD/NECK:  Near complete stenosis of the left M1 MCA with normal flow seen distally.    Moderate focal stenosis of the A2 segment of the right DIVINE.    Other areas of atherosclerotic disease described above.      EXAMINATION:  PHYSICAL EXAM:    Constitutional: No Acute Distress     Neurological: Awake, alert oriented to person, place and time, Following Commands, PERRL, EOMI, No Gaze Preference, R Face ASymmetry    Motor exam:          Upper extremity                         Delt     Bicep     Tricep    HG                                                 R                                                      L          5/5        5/5        5/5       5/5          Lower extremity                        HF         KF        KE       DF         PF                                                  R                                                      L         5/5        5/5       5/5       5/5          5/5                                                 Sensation: [ ] intact to light touch  [ ] decreased:      Pulmonary: Clear to Auscultation, No rales, No rhonchi, No wheezes     Cardiovascular: S1, S2, Regular rate and rhythm     Gastrointestinal: Soft, Non-tender, Non-distended     Extremities: No calf tenderness

## 2025-05-21 NOTE — DISCHARGE NOTE PROVIDER - NSDCMRMEDTOKEN_GEN_ALL_CORE_FT
aspirin 81 mg oral tablet, chewable: 1 tab(s) orally once a day  Budeprion  mg/12 hours oral tablet, extended release: 1 tab(s) orally once a day  finasteride 5 mg oral tablet: 1 tablet orally once a day  gabapentin 400 mg oral tablet: 1 tab(s) orally 2 times a day  HumaLOG Mix 75/25 Pen subcutaneous suspension: subcutaneous 2 times a day  levothyroxine 150 mcg (0.15 mg) oral tablet: 1 tablet orally once a day  lisinopril 10 mg oral tablet: 1 tab(s) orally once a day  pantoprazole 40 mg oral delayed release tablet: 1 tab(s) orally once a day (before a meal)  rosuvastatin 40 mg oral tablet: 1 tab(s) orally once a day  sodium bicarbonate: 1 2 times a day  ticagrelor 90 mg oral tablet: 1 tab(s) orally 2 times a day   amLODIPine 5 mg oral tablet: 1 tab(s) orally once a day  aspirin 81 mg oral tablet, chewable: 1 tab(s) orally once a day  Budeprion  mg/12 hours oral tablet, extended release: 1 tab(s) orally once a day  finasteride 5 mg oral tablet: 1 tablet orally once a day  gabapentin 400 mg oral tablet: 1 tab(s) orally 2 times a day  HumaLOG Mix 75/25 Pen subcutaneous suspension: subcutaneous 2 times a day  levothyroxine 125 mcg (0.125 mg) oral tablet: 1 tab(s) orally once a day  lisinopril 10 mg oral tablet: 1 tab(s) orally once a day  pantoprazole 40 mg oral delayed release tablet: 1 tab(s) orally once a day (before a meal)  rosuvastatin 40 mg oral tablet: 1 tab(s) orally once a day  sodium bicarbonate: 1 2 times a day  ticagrelor 90 mg oral tablet: 1 tab(s) orally 2 times a day

## 2025-05-21 NOTE — PHARMACOTHERAPY INTERVENTION NOTE - COMMENTS
GASTON DURAN 74y Female    Assessed patient medications for high-risk medications:    Benzodiazepines  Opioids  High-anticholinergic medications  Sedatives/ sleep medications  Muscle relaxants  Tricyclic antidepressants  Antipsychotics    aspirin  chewable 81 milliGRAM(s) Oral daily  bisacodyl 5 milliGRAM(s) Oral daily PRN  chlorhexidine 2% Cloths 1 Application(s) Topical <User Schedule>  dextrose 5%. 1000 milliLiter(s) IV Continuous <Continuous>  dextrose 5%. 1000 milliLiter(s) IV Continuous <Continuous>  dextrose 50% Injectable 25 Gram(s) IV Push once  dextrose 50% Injectable 25 Gram(s) IV Push once  dextrose Oral Gel 15 Gram(s) Oral once PRN  gabapentin 400 milliGRAM(s) Oral daily  glucagon  Injectable 1 milliGRAM(s) IntraMuscular once  insulin glargine Injectable (LANTUS) 20 Unit(s) SubCutaneous at bedtime  insulin lispro (ADMELOG) corrective regimen sliding scale   SubCutaneous three times a day before meals  insulin lispro Injectable (ADMELOG) 7 Unit(s) SubCutaneous three times a day before meals  levothyroxine 150 MICROGram(s) Oral daily  lisinopril 10 milliGRAM(s) Oral daily  pantoprazole    Tablet 40 milliGRAM(s) Oral daily  rosuvastatin 40 milliGRAM(s) Oral at bedtime  ticagrelor 90 milliGRAM(s) Oral every 12 hours      [ x   ] Team informed of high risk medications   [     ] None of the above medications identified.   [  x  ] Discussed with prescriber and no interventions at this time-- gabapentin home med, controlled, no REBECA, will monitor    Discussed with prescriber, if appropriate, to consider:   [      ]HOLDING   [      ]REDUCING  [   x  ]CONTINUE

## 2025-05-21 NOTE — DISCHARGE NOTE PROVIDER - NSDCCPCAREPLAN_GEN_ALL_CORE_FT
PRINCIPAL DISCHARGE DIAGNOSIS  Diagnosis: Intracranial vascular stenosis  Assessment and Plan of Treatment:      PRINCIPAL DISCHARGE DIAGNOSIS  Diagnosis: Intracranial vascular stenosis  Assessment and Plan of Treatment: You came to the hospital due to having severe narrowing of an artery in the left side of your brain with plan for stent placement and had a stent placed on 5/20. After the procedure, you have felt well and did not have any new symptoms. You are now ready to be discharged home. Please continue to take your aspirin and brillinta as recommended by the neuroendovascular team. Please follow up with Dr. Eden on 5/30/25 and follow up in the stroke clinic within 2 weeks (someone will call you about the stroke clinic appointment). If you notice any new numbness, weakness, speech issues, vision issues, or dizziness, please return to the emergency room.     PRINCIPAL DISCHARGE DIAGNOSIS  Diagnosis: Intracranial vascular stenosis  Assessment and Plan of Treatment: You came to the hospital due to having severe narrowing of an artery in the left side of your brain with plan for stent placement and had a stent placed on 5/20. After the procedure, you have felt well and did not have any new symptoms. You are now ready to be discharged home. Please continue to take your aspirin and brillinta as recommended by the neuroendovascular team. Please follow up with Dr. Eden on 5/30/25 and follow up in the stroke clinic within 2 weeks (someone will call you about the stroke clinic appointment). If you notice any new numbness, weakness, speech issues, vision issues, or dizziness, please return to the emergency room.      SECONDARY DISCHARGE DIAGNOSES  Diagnosis: Hypothyroidism  Assessment and Plan of Treatment: Your TSH levels were very high while in the hospital, likely due to your levothyroxine dose being too high. We decreased the dose to 125 mcg daily. Please take the new dose of levothyroxine as prescribed and follow up in endocrinology clinic within 4 weeks.     PRINCIPAL DISCHARGE DIAGNOSIS  Diagnosis: Intracranial vascular stenosis  Assessment and Plan of Treatment: You came to the hospital due to having severe narrowing of an artery in the left side of your brain with plan for stent placement and had a stent placed on 5/20. After the procedure, you have felt well and did not have any new symptoms. You are now ready to be discharged home. Please continue to take your aspirin and brillinta as recommended by the neuroendovascular team. Please follow up with Dr. Eden on 5/30/25 and follow up in the stroke clinic within 2 weeks (someone will call you about the stroke clinic appointment). If you notice any new numbness, weakness, speech issues, vision issues, or dizziness, please return to the emergency room.      SECONDARY DISCHARGE DIAGNOSES  Diagnosis: Hypothyroidism  Assessment and Plan of Treatment: Your TSH levels were very high while in the hospital, likely due to your levothyroxine dose being too high. We decreased the dose to 125 mcg daily. Please take the new dose of levothyroxine as prescribed and follow up in endocrinology clinic within 4 weeks.    Diagnosis: Anemia of chronic disease  Assessment and Plan of Treatment: Please follow up your B12 and folate level with your primary care provider and the hematologist within 4 weeks.

## 2025-05-22 LAB
ALBUMIN SERPL ELPH-MCNC: 3.4 G/DL — LOW (ref 3.5–5.2)
ALP SERPL-CCNC: 147 U/L — HIGH (ref 30–115)
ALT FLD-CCNC: 30 U/L — SIGNIFICANT CHANGE UP (ref 0–41)
ANION GAP SERPL CALC-SCNC: 8 MMOL/L — SIGNIFICANT CHANGE UP (ref 7–14)
AST SERPL-CCNC: 36 U/L — SIGNIFICANT CHANGE UP (ref 0–41)
BASOPHILS # BLD AUTO: 0.01 K/UL — SIGNIFICANT CHANGE UP (ref 0–0.2)
BASOPHILS NFR BLD AUTO: 0.3 % — SIGNIFICANT CHANGE UP (ref 0–1)
BILIRUB SERPL-MCNC: 0.2 MG/DL — SIGNIFICANT CHANGE UP (ref 0.2–1.2)
BUN SERPL-MCNC: 31 MG/DL — HIGH (ref 10–20)
CA-I BLD-SCNC: 5.2 MG/DL — SIGNIFICANT CHANGE UP (ref 4.5–5.6)
CALCIUM SERPL-MCNC: 9.2 MG/DL — SIGNIFICANT CHANGE UP (ref 8.4–10.5)
CHLORIDE SERPL-SCNC: 107 MMOL/L — SIGNIFICANT CHANGE UP (ref 98–110)
CO2 SERPL-SCNC: 24 MMOL/L — SIGNIFICANT CHANGE UP (ref 17–32)
CREAT SERPL-MCNC: 2.2 MG/DL — HIGH (ref 0.7–1.5)
EGFR: 23 ML/MIN/1.73M2 — LOW
EGFR: 23 ML/MIN/1.73M2 — LOW
EOSINOPHIL # BLD AUTO: 0.13 K/UL — SIGNIFICANT CHANGE UP (ref 0–0.7)
EOSINOPHIL NFR BLD AUTO: 3.4 % — SIGNIFICANT CHANGE UP (ref 0–8)
GLUCOSE BLDC GLUCOMTR-MCNC: 161 MG/DL — HIGH (ref 70–99)
GLUCOSE BLDC GLUCOMTR-MCNC: 170 MG/DL — HIGH (ref 70–99)
GLUCOSE BLDC GLUCOMTR-MCNC: 265 MG/DL — HIGH (ref 70–99)
GLUCOSE BLDC GLUCOMTR-MCNC: 324 MG/DL — HIGH (ref 70–99)
GLUCOSE SERPL-MCNC: 224 MG/DL — HIGH (ref 70–99)
HCT VFR BLD CALC: 26.2 % — LOW (ref 37–47)
HGB BLD-MCNC: 8.8 G/DL — LOW (ref 12–16)
IMM GRANULOCYTES NFR BLD AUTO: 0.5 % — HIGH (ref 0.1–0.3)
LYMPHOCYTES # BLD AUTO: 0.89 K/UL — LOW (ref 1.2–3.4)
LYMPHOCYTES # BLD AUTO: 23.2 % — SIGNIFICANT CHANGE UP (ref 20.5–51.1)
MAGNESIUM SERPL-MCNC: 1.7 MG/DL — LOW (ref 1.8–2.4)
MCHC RBC-ENTMCNC: 33.6 G/DL — SIGNIFICANT CHANGE UP (ref 32–37)
MCHC RBC-ENTMCNC: 36.8 PG — HIGH (ref 27–31)
MCV RBC AUTO: 109.6 FL — HIGH (ref 81–99)
MONOCYTES # BLD AUTO: 0.5 K/UL — SIGNIFICANT CHANGE UP (ref 0.1–0.6)
MONOCYTES NFR BLD AUTO: 13.1 % — HIGH (ref 1.7–9.3)
NEUTROPHILS # BLD AUTO: 2.28 K/UL — SIGNIFICANT CHANGE UP (ref 1.4–6.5)
NEUTROPHILS NFR BLD AUTO: 59.5 % — SIGNIFICANT CHANGE UP (ref 42.2–75.2)
NRBC BLD AUTO-RTO: 0 /100 WBCS — SIGNIFICANT CHANGE UP (ref 0–0)
PHOSPHATE SERPL-MCNC: 3 MG/DL — SIGNIFICANT CHANGE UP (ref 2.1–4.9)
PLATELET # BLD AUTO: 149 K/UL — SIGNIFICANT CHANGE UP (ref 130–400)
PMV BLD: 9.1 FL — SIGNIFICANT CHANGE UP (ref 7.4–10.4)
POTASSIUM SERPL-MCNC: 4.9 MMOL/L — SIGNIFICANT CHANGE UP (ref 3.5–5)
POTASSIUM SERPL-SCNC: 4.9 MMOL/L — SIGNIFICANT CHANGE UP (ref 3.5–5)
PROT SERPL-MCNC: 5.7 G/DL — LOW (ref 6–8)
RBC # BLD: 2.39 M/UL — LOW (ref 4.2–5.4)
RBC # FLD: 14.3 % — SIGNIFICANT CHANGE UP (ref 11.5–14.5)
SODIUM SERPL-SCNC: 139 MMOL/L — SIGNIFICANT CHANGE UP (ref 135–146)
WBC # BLD: 3.83 K/UL — LOW (ref 4.8–10.8)
WBC # FLD AUTO: 3.83 K/UL — LOW (ref 4.8–10.8)

## 2025-05-22 PROCEDURE — 99233 SBSQ HOSP IP/OBS HIGH 50: CPT

## 2025-05-22 PROCEDURE — 99232 SBSQ HOSP IP/OBS MODERATE 35: CPT

## 2025-05-22 RX ORDER — INSULIN GLARGINE-YFGN 100 [IU]/ML
7 INJECTION, SOLUTION SUBCUTANEOUS ONCE
Refills: 0 | Status: COMPLETED | OUTPATIENT
Start: 2025-05-22 | End: 2025-05-22

## 2025-05-22 RX ORDER — MAGNESIUM SULFATE 500 MG/ML
1 SYRINGE (ML) INJECTION ONCE
Refills: 0 | Status: COMPLETED | OUTPATIENT
Start: 2025-05-22 | End: 2025-05-22

## 2025-05-22 RX ORDER — AMLODIPINE BESYLATE 10 MG/1
5 TABLET ORAL DAILY
Refills: 0 | Status: DISCONTINUED | OUTPATIENT
Start: 2025-05-22 | End: 2025-05-23

## 2025-05-22 RX ORDER — LISINOPRIL 5 MG/1
10 TABLET ORAL ONCE
Refills: 0 | Status: DISCONTINUED | OUTPATIENT
Start: 2025-05-22 | End: 2025-05-22

## 2025-05-22 RX ORDER — ENOXAPARIN SODIUM 100 MG/ML
40 INJECTION SUBCUTANEOUS EVERY 24 HOURS
Refills: 0 | Status: DISCONTINUED | OUTPATIENT
Start: 2025-05-22 | End: 2025-05-23

## 2025-05-22 RX ORDER — INSULIN GLARGINE-YFGN 100 [IU]/ML
27 INJECTION, SOLUTION SUBCUTANEOUS AT BEDTIME
Refills: 0 | Status: DISCONTINUED | OUTPATIENT
Start: 2025-05-22 | End: 2025-05-23

## 2025-05-22 RX ORDER — INSULIN LISPRO 100 U/ML
2 INJECTION, SOLUTION INTRAVENOUS; SUBCUTANEOUS ONCE
Refills: 0 | Status: COMPLETED | OUTPATIENT
Start: 2025-05-22 | End: 2025-05-22

## 2025-05-22 RX ORDER — ACETAMINOPHEN 500 MG/5ML
650 LIQUID (ML) ORAL EVERY 6 HOURS
Refills: 0 | Status: DISCONTINUED | OUTPATIENT
Start: 2025-05-22 | End: 2025-05-23

## 2025-05-22 RX ORDER — INSULIN LISPRO 100 U/ML
9 INJECTION, SOLUTION INTRAVENOUS; SUBCUTANEOUS
Refills: 0 | Status: DISCONTINUED | OUTPATIENT
Start: 2025-05-22 | End: 2025-05-23

## 2025-05-22 RX ADMIN — Medication 81 MILLIGRAM(S): at 11:38

## 2025-05-22 RX ADMIN — Medication 100 GRAM(S): at 11:53

## 2025-05-22 RX ADMIN — Medication 40 MILLIGRAM(S): at 11:39

## 2025-05-22 RX ADMIN — Medication 1 APPLICATION(S): at 06:02

## 2025-05-22 RX ADMIN — TICAGRELOR 90 MILLIGRAM(S): 90 TABLET ORAL at 06:02

## 2025-05-22 RX ADMIN — AMLODIPINE BESYLATE 5 MILLIGRAM(S): 10 TABLET ORAL at 12:29

## 2025-05-22 RX ADMIN — INSULIN LISPRO 8: 100 INJECTION, SOLUTION INTRAVENOUS; SUBCUTANEOUS at 11:39

## 2025-05-22 RX ADMIN — INSULIN LISPRO 7 UNIT(S): 100 INJECTION, SOLUTION INTRAVENOUS; SUBCUTANEOUS at 07:46

## 2025-05-22 RX ADMIN — INSULIN GLARGINE-YFGN 27 UNIT(S): 100 INJECTION, SOLUTION SUBCUTANEOUS at 22:26

## 2025-05-22 RX ADMIN — TICAGRELOR 90 MILLIGRAM(S): 90 TABLET ORAL at 17:03

## 2025-05-22 RX ADMIN — INSULIN LISPRO 9 UNIT(S): 100 INJECTION, SOLUTION INTRAVENOUS; SUBCUTANEOUS at 17:04

## 2025-05-22 RX ADMIN — INSULIN LISPRO 6: 100 INJECTION, SOLUTION INTRAVENOUS; SUBCUTANEOUS at 07:46

## 2025-05-22 RX ADMIN — ROSUVASTATIN CALCIUM 40 MILLIGRAM(S): 20 TABLET, FILM COATED ORAL at 22:28

## 2025-05-22 RX ADMIN — LISINOPRIL 10 MILLIGRAM(S): 5 TABLET ORAL at 06:01

## 2025-05-22 RX ADMIN — Medication 125 MICROGRAM(S): at 06:02

## 2025-05-22 RX ADMIN — INSULIN GLARGINE-YFGN 7 UNIT(S): 100 INJECTION, SOLUTION SUBCUTANEOUS at 12:56

## 2025-05-22 RX ADMIN — GABAPENTIN 400 MILLIGRAM(S): 400 CAPSULE ORAL at 11:38

## 2025-05-22 RX ADMIN — INSULIN LISPRO 7 UNIT(S): 100 INJECTION, SOLUTION INTRAVENOUS; SUBCUTANEOUS at 11:40

## 2025-05-22 RX ADMIN — INSULIN LISPRO 2: 100 INJECTION, SOLUTION INTRAVENOUS; SUBCUTANEOUS at 17:03

## 2025-05-22 RX ADMIN — INSULIN LISPRO 2 UNIT(S): 100 INJECTION, SOLUTION INTRAVENOUS; SUBCUTANEOUS at 12:29

## 2025-05-22 NOTE — PROGRESS NOTE ADULT - SUBJECTIVE AND OBJECTIVE BOX
Neurointerventional Progress Note    POD# 2  S/P diagnostic cerebral angiogram + MCA angioplasty and stenting x 1        HPI:  The patient is a 74-year-old female with a PMHx of left trigger finger, AS, MR, osteoporosis, DM with Charcot foot, and HTN, who was evaluated in the neurosurgery outpatient office due to known focal severe left M1 Middle Cerebral Artery narrowing (confirmed on MRA 5/12/2025). She has had three prior strokes while on Aspirin and Plavix, however she was found to be subtherapeutic on Plavix and subsequently switched to Brilinta. She is also s/p loop recorder placement. The patient had an MRA NOVA 5 /12/2025 and now presents for a diagnostic cerebral angiogram. NPO except medication after midnight last night. IV normal saline on arrival. Brilinta 90mg bid and Aspirin 81mg daily PO last dose this morning. (20 May 2025 09:48)  --------------------------    Pt seen and examined at the bedside.  Pt was downgrade OVN to 3E, no major over night events.  At present time pt is resting in bed comfortably, pt states she is tolerating a PO diet and ambulated with PT well.  On exam pt is AO3, following, EOMI, speech clear/fluent, DAVIS x4 with good strength, no drifts, no FND appreciated.  Pt denies HA, n/v, dizziness.  Right arteriotomy site CDI, no s/s hematoma, some proximal ecchymosis, non tender, RUE warm, +2 radial pulses.                  HTN (hypertension)    History of TIAs    Hypothyroid    Arthritis    DM (diabetes mellitus)    CORTEZ on CPAP    Stage 3 chronic kidney disease        24-Hour Events: None    Medication:  aspirin  chewable 81 milliGRAM(s) Oral daily  chlorhexidine 2% Cloths 1 Application(s) Topical <User Schedule>  dextrose 5%. 1000 milliLiter(s) IV Continuous <Continuous>  dextrose 5%. 1000 milliLiter(s) IV Continuous <Continuous>  dextrose 50% Injectable 25 Gram(s) IV Push once  dextrose 50% Injectable 25 Gram(s) IV Push once  gabapentin 400 milliGRAM(s) Oral daily  glucagon  Injectable 1 milliGRAM(s) IntraMuscular once  insulin glargine Injectable (LANTUS) 20 Unit(s) SubCutaneous at bedtime  insulin lispro (ADMELOG) corrective regimen sliding scale   SubCutaneous three times a day before meals  insulin lispro Injectable (ADMELOG) 7 Unit(s) SubCutaneous three times a day before meals  lactated ringers. 1000 milliLiter(s) IV Continuous <Continuous>  levothyroxine 125 MICROGram(s) Oral daily  lisinopril 10 milliGRAM(s) Oral daily  pantoprazole    Tablet 40 milliGRAM(s) Oral daily  rosuvastatin 40 milliGRAM(s) Oral at bedtime  ticagrelor 90 milliGRAM(s) Oral every 12 hours    clindamycin (Rash)      Recent Vitals:  T(F): 97.4 (05-22-25 @ 08:00), Max: 98 (05-21-25 @ 16:00)  HR: 84 (05-22-25 @ 08:00) (73 - 89)  BP: 152/72 (05-22-25 @ 08:00) (114/48 - 152/72)  RR: 17 (05-22-25 @ 08:00) (14 - 26)  SpO2: 98% (05-22-25 @ 08:00) (94% - 100%)    EVD: [ ] [place]: [ ]     ICP:     CPP:     Level(cm):     24hr(ml):     CSF:     W:     R:     C:     P:     G:     LA:          Recent Labs:                        8.8    3.83  )-----------( 149      ( 22 May 2025 05:57 )             26.2     05-22    139  |  107  |  31[H]  ----------------------------<  224[H]  4.9   |  24  |  2.2[H]    Ca    9.2      22 May 2025 05:57  Phos  4.1     05-21  Mg     1.7     05-22    TPro  5.7[L]  /  Alb  3.4[L]  /  TBili  0.2  /  DBili  x   /  AST  36  /  ALT  30  /  AlkPhos  147[H]  05-22    PT/INR - ( 20 May 2025 13:24 )   PT: 12.40 sec;   INR: 1.05 ratio         PTT - ( 20 May 2025 13:24 )  PTT:>200.0 sec  Urinalysis Basic - ( 22 May 2025 05:57 )    Color: x / Appearance: x / SG: x / pH: x  Gluc: 224 mg/dL / Ketone: x  / Bili: x / Urobili: x   Blood: x / Protein: x / Nitrite: x   Leuk Esterase: x / RBC: x / WBC x   Sq Epi: x / Non Sq Epi: x / Bacteria: x      LIVER FUNCTIONS - ( 22 May 2025 05:57 )  Alb: 3.4 g/dL / Pro: 5.7 g/dL / ALK PHOS: 147 U/L / ALT: 30 U/L / AST: 36 U/L / GGT: x             Exam:  General:   Mental Status: AAO x3, recent and remote memory intact. Naming, repetition, and comprehension intact. Attention/concentration intact. No dysarthria or aphasia. Appropriate fund of knowledge.  CN: PERRL, full visual fields, no nystagmus, EOMi, V1-V3 intact b/l and symmetric, face symmetric, hearing intact to finger rub, tongue midline, palate elevation symmetric.   Motor: 5/5 b/l UE/LE,  strength 5/5. Normal bulk and tone. No abnormal movements.   Sensation: Intact to light touch.   Coordination: No dysmetria on finger-to-nose and heel-to-shin.   Gait:   NIHSS:     Radiology:             Assessment:    The patient is a 74-year-old female with a PMHx of left trigger finger, AS, MR, osteoporosis, DM with Charcot foot, and HTN, who was evaluated in the neurosurgery outpatient office due to known focal severe left M1 Middle Cerebral Artery narrowing (confirmed on MRA 5/12/2025). POD 1 s/p diagnostic cerebral angiogram + MCA angioplasty and stenting x 1 with Dr Eden.      Suggestions:  - Continue to monitor the right arm for signs of bleeding, increased swelling, and ischemia; monitor for infection   - Continue DAPT: Aspirin 81mg daily and Brilinta 90mg q 12 hours  - SBP goal 100-140  - Keep euvolemic, trend Cr (2.4 -> 2.3 -> 2.2)  - Diabetes control (A1C 8.6)   - Continued monitoring of CBC abd BMP  - Follow up outpatient with Dr Eden after discharge  - Management per primary team  [id number] Neuroendovascular if there are acute changes in the patient's neurological status/exam or with additional questions or concerns      Plan and care d/w attending and primary nurse, pt updated

## 2025-05-22 NOTE — CONSULT NOTE ADULT - ASSESSMENT
74-year-old female with a PMHx of left trigger finger, AS, MR, osteoporosis, DM with Charcot foot, and HTN, who was evaluated in the neurosurgery outpatient office due to known focal severe left M1 Middle Cerebral Artery narrowing (confirmed on MRA 5/12/2025).   Pt admitted transferred from [facility] TO  stroke [facility] under Neuro - Medicine called to co-manage     [] known focal severe left M1 Middle Cerebral Artery narrowing-  S/P diagnostic cerebral angiogram + MCA angioplasty and stenting 5/20/2025  [] Roger on Chronic kidney disease 3  [] recent CVA (MCA stenosis) s/p ILR    [] HTN   []DM   [] Hypothyroid  [] Leukopenia / Macrocytic anemia  []HLD     Management as per Neurology team   Pt stated that she used to be lisinopril 20 mg- HCTZ , But was recently changed to Only Lisinopril 10 mg as per her kidney doctor   discussed with the patient in details regarding the need to f/u with her PCP and repeat her blood work including CBC, CMP , electrolytes with in one week   also explained to her that can add amlodipine 5 mg daily if blood pressure > 140 until she follows with her primary doctor , risks discussed and she agreed   -she stated that her FS at home range between 70 to 280 usually depend on the diet   encouraged healthy diet and low K diet and she stated understanding   - a1c 8.1 c/w monitor FS and adjust lantus / Lispro as needed - Consider increase lantus to 27 and lispro to 9/9/9  while inpatient and monitor   uses Humalog mix 75/25 at home , can resume on discharge   - TSH 0.02, Free t4 1.8 Levothyroxine decrease by the NSICU from 150 to 125 mcg  continue 125 mcg on discharge and repeat thyroid panel in 3-4 weeks   Outpatient endocrinology    monitor electrolytes   strickt I/Os  avoid nephrotoxins   Creatinine Trend: 2.2<--, 2.3<--, 2.4<--, 2.4<--, 2.4<--, 2.2<--  on gentel hydration LR 50 CC/ h can d/c after completion   adjust meds renally dosed   Monitor CBC and Outpatient Hematology for Macrocytic anemia and leukopenia   monitor Alk phos   outpatient pulm f/u for CORTEZ   Aspirin/ Brilinta statin as per neuro    , 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 Sclerotic aortic valve with normal opening.  outpatient f/u with her cardiologist   outpatient f/u with her PCP and nephro    monitor arm site of the procedure for hematoma or tenderness , vascular checks     DVT ppx as per Primary team Consider SCD and start heparin sq if Ok with the primary team   GI ppx as per Primary team     Discussed with the patient in details and length she stated understanding     Discussed with Primary team     Please call me on teams  if any change on medical condition or with any questions        74-year-old female with a PMHx of left trigger finger, AS, MR, osteoporosis, DM with Charcot foot, and HTN, who was evaluated in the neurosurgery outpatient office due to known focal severe left M1 Middle Cerebral Artery narrowing (confirmed on MRA 5/12/2025).   Pt admitted transferred from [facility] TO  stroke [facility] under Neuro - Medicine called to co-manage     [] known focal severe left M1 Middle Cerebral Artery narrowing-  S/P diagnostic cerebral angiogram + MCA angioplasty and stenting 5/20/2025  [] Roger on Chronic kidney disease 4  [] recent CVA (MCA stenosis) s/p ILR    [] HTN   []DM   [] Hypothyroid  [] Leukopenia / Macrocytic anemia  []HLD     Management as per Neurology team   Pt stated that she used to be lisinopril 20 mg- HCTZ , But was recently changed to Only Lisinopril 10 mg as per her kidney doctor   discussed with the patient in details regarding the need to f/u with her PCP and repeat her blood work including CBC, CMP , electrolytes with in one week   also explained to her that can add amlodipine 5 mg daily if blood pressure > 140 until she follows with her primary doctor , risks discussed and she agreed   -she stated that her FS at home range between 70 to 280 usually depend on the diet   encouraged healthy diet and low K diet and she stated understanding   - a1c 8.1 c/w monitor FS and adjust lantus / Lispro as needed - Consider increase lantus to 27 and lispro to 9/9/9  while inpatient and monitor   uses Humalog mix 75/25 at home , can resume on discharge   - TSH 0.02, Free t4 1.8 Levothyroxine decrease by the NSICU from 150 to 125 mcg  continue 125 mcg on discharge and repeat thyroid panel in 3-4 weeks   Outpatient endocrinology    monitor electrolytes   strickt I/Os  avoid nephrotoxins   Creatinine Trend: 2.2<--, 2.3<--, 2.4<--, 2.4<--, 2.4<--, 2.2<--  on gentel hydration LR 50 CC/ h can d/c after completion   adjust meds renally dosed   Monitor CBC and Outpatient Hematology for Macrocytic anemia and leukopenia   monitor Alk phos   outpatient pulm f/u for CORTEZ   Aspirin/ Brilinta statin as per neuro    , 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 Sclerotic aortic valve with normal opening.  outpatient f/u with her cardiologist   outpatient f/u with her PCP and nephro    monitor arm site of the procedure for hematoma or tenderness , vascular checks     DVT ppx as per Primary team Consider SCD and start heparin sq if Ok with the primary team   GI ppx as per Primary team     Discussed with the patient in details and length she stated understanding     Discussed with Primary team     Please call me on teams  if any change on medical condition or with any questions

## 2025-05-22 NOTE — PROGRESS NOTE ADULT - ASSESSMENT
Patient is a 73 yo F w/PMHx of left trigger finger, AS, MR, osteoporosis, DM with Charcot foot, HTN, and three prior stroke s/p loop recorder placement who was evaluated in the neurosurgery outpatient office due to known focal severe left M1 Middle Cerebral Artery narrowing (confirmed on MRA 5/12/2025). She has had three prior strokes while on Aspirin and Plavix, however she was found to be subtherapeutic on Plavix and subsequently switched to Brilinta. She is also on Crestor 4mg and is s/p loop recorder placement. The patient had an MRA NOVA 5 /12/2025 and now presents for a diagnostic cerebral angiogram. NPO except medication after midnight last night. IV normal saline on arrival. Brilinta 90mg bid and Aspirin 81mg daily PO last dose this morning.    POD #1  s/p left MCA stent x1 and balloon angioplasty via right radial arteriotomy.     #Neuro:  - NIHSS checks q4h  - DAPT Aspirin 81mg daily and Brilinta 90mg q 12 hours   - PT/OT    #CV: HTN  - -140  - D/C stephanie   - Hold home meds since SBP     #Resp: Sleep apnea   - Non compliant with CPAP   - HOB   - Aspiration precautions  - Keep SaO2 > 92%  - OOB   - IS  10x q 1 while awake     #Renal/Fluid/Electrolytes:- Acute on Chronic renal insuff   - Strict I/Os  -Cr stablized 2.3 ( 2.4)  - Keep euvolemic  - LR at 50cc/hr   - BMP in am if Cr stable will D/C LR  - Keep normonatremic - 135- < 145   - Keep Magnesium level > 2  - Monitor lytes, replete as needed  - No durbin     #GI:-  GERD   - Home Protonix   - DASH and CCD   - Nl LFT's     #Heme/Onc: Anemia of Chronic Disease  - HGB stable   - SCS while in bed    #Endo: Type 2 DM/ Hypothyroid  - TSH- .02 and Free T4- 1.8 decrease dose 125mcg /day - F/U TSH and Free T4 in one mo.  - HISS - home Humalog 75/25  - Start Lispro 7u w/meals TID and Lantus 20u qHs  - Keep normoglycemic    #ID:  - Keep normothermic; avoid fevers    --------------------------------------------------------------------------------------------------------------------------------  FOR FOLLOW UP:  [ ] BMP in am if creat stable d/c IVF  [ ] BS control   [ ] TSH and Free T4 in a month s/p synthroid 150mcg ->125  [ ] Restart Home antiHTN when appropriated.   Patient is a 73 yo F w/PMHx of left trigger finger, AS, MR, osteoporosis, DM with Charcot foot, HTN, and three prior stroke s/p loop recorder placement who was evaluated in the neurosurgery outpatient office due to known focal severe left M1 Middle Cerebral Artery narrowing (confirmed on MRA 5/12/2025). She has had three prior strokes while on Aspirin and Plavix, however she was found to be subtherapeutic on Plavix and subsequently switched to Brilinta. She is also on Crestor 4mg and is s/p loop recorder placement. The patient had an MRA NOVA 5 /12/2025 and now presents for a diagnostic cerebral angiogram. NPO except medication after midnight last night. IV normal saline on arrival. Brilinta 90mg bid and Aspirin 81mg daily PO last dose this morning.    #Severe L MCA stenosis s/p left MCA stent x1 and balloon angioplasty via right radial arteriotomy.   - NIHSS checks q8h  - c/w Aspirin 81mg daily and Brilinta 90mg q 12 hours  - c/w home rosuvastatin 40 mg nightly  - -140  - Home PT as per PT recs    #HTN  - c/w lisinopril 10 mg daily  - start amlodipine 5 mg daily, increase to 10 mg if needed    #Sleep apnea   - Noncompliant with CPAP    #BARBER on CKD (resolving)  - Cr downtrending  - f/u AM Cr    #GERD   - c/w home Protonix     #Anemia of Chronic Disease  - HGB stable    #Type 2 DM  - On home humalog 75/25 bid  - Increased lispro to 9 u TIDAC and lantus 27 u qHS  - mISS and FS     Hypothyroid  - TSH- .02 and Free T4- 1.8 decrease dose 125mcg /day   - F/U TSH and Free T4 in one mo outpatient with endocrinology f/u    #MISC  - DVT PPx: Lovenox  - Diet: DASH/CC  - Dispo: Home with home PT

## 2025-05-22 NOTE — CONSULT NOTE ADULT - SUBJECTIVE AND OBJECTIVE BOX
GASTON DURAN  74y, Female  Allergy: clindamycin (Rash)    Hospital Day: 2d    Patient seen and examined earlier today. she feels fine no c/o , Pt denies headache, dizziness sob, chest pain, nausea, vomiting, dysuria constipation or abd pain.     PMH/PSH:  PAST MEDICAL & SURGICAL HISTORY:  HTN (hypertension)      History of TIAs      Hypothyroid      Arthritis      DM (diabetes mellitus)      CORTEZ on CPAP      Stage 3 chronic kidney disease      H/O total knee replacement      H/O carpal tunnel repair      History of trigger finger          LAST 24-Hr EVENTS:    VITALS:  T(F): 97.4 (05-22-25 @ 08:00), Max: 98 (05-21-25 @ 16:00)  HR: 84 (05-22-25 @ 08:00)  BP: 149/57 (05-22-25 @ 09:00) (115/51 - 152/72)  RR: 17 (05-22-25 @ 08:00)  SpO2: 98% (05-22-25 @ 08:00)          TESTS & MEASUREMENTS:  Weight/BMI  103 (05-21-25 @ 20:12)  41.5 (05-21-25 @ 20:12)    05-20-25 @ 07:01  -  05-21-25 @ 07:00  --------------------------------------------------------  IN: 800 mL / OUT: 1300 mL / NET: -500 mL    05-21-25 @ 07:01  -  05-22-25 @ 07:00  --------------------------------------------------------  IN: 570 mL / OUT: 200 mL / NET: 370 mL    05-22-25 @ 07:01  -  05-22-25 @ 11:28  --------------------------------------------------------  IN: 100 mL / OUT: 0 mL / NET: 100 mL                            8.8    3.83  )-----------( 149      ( 22 May 2025 05:57 )             26.2     PT/INR - ( 20 May 2025 13:24 )   PT: 12.40 sec;   INR: 1.05 ratio         PTT - ( 20 May 2025 13:24 )  PTT:>200.0 sec  INR: 1.05 ratio (05-20-25 @ 13:24)    05-22    139  |  107  |  31[H]  ----------------------------<  224[H]  4.9   |  24  |  2.2[H]    Ca    9.2      22 May 2025 05:57  Phos  3.0     05-22  Mg     1.7     05-22    TPro  5.7[L]  /  Alb  3.4[L]  /  TBili  0.2  /  DBili  x   /  AST  36  /  ALT  30  /  AlkPhos  147[H]  05-22    LIVER FUNCTIONS - ( 22 May 2025 05:57 )  Alb: 3.4 g/dL / Pro: 5.7 g/dL / ALK PHOS: 147 U/L / ALT: 30 U/L / AST: 36 U/L / GGT: x                 Urinalysis Basic - ( 22 May 2025 05:57 )    Color: x / Appearance: x / SG: x / pH: x  Gluc: 224 mg/dL / Ketone: x  / Bili: x / Urobili: x   Blood: x / Protein: x / Nitrite: x   Leuk Esterase: x / RBC: x / WBC x   Sq Epi: x / Non Sq Epi: x / Bacteria: x                    A1C with Estimated Average Glucose Result: 8.1 % (05-20-25 @ 13:23)  A1C with Estimated Average Glucose Result: 8.6 % (05-12-25 @ 17:11)          RADIOLOGY, ECG, & ADDITIONAL TESTS:  12 Lead ECG:   Ventricular Rate 77 BPM    Atrial Rate 77 BPM    P-R Interval 200 ms    QRS Duration 150 ms    Q-T Interval 414 ms    QTC Calculation(Bazett) 468 ms    P Axis 55 degrees    R Axis -53 degrees    T Axis 5 degrees    Diagnosis Line Sinus rhythm with occasional Premature ventricular complexes  Right bundle branch block  Left anterior fascicular block  *** Bifascicular block ***  Minimal voltage criteria for LVH, may be normal variant ( R in aVL )  Abnormal ECG    Confirmed by Behuria, Supreeti ([id number]) on 3/21/2025 7:59:33 PM (03-21-25 @ 13:00)      RECENT DIAGNOSTIC ORDERS:  Diet, DASH/TLC:   Sodium & Cholesterol Restricted  Consistent Carbohydrate No Snacks (CSTCHO) (05-21-25 @ 13:27)      MEDICATIONS:  MEDICATIONS  (STANDING):  aspirin  chewable 81 milliGRAM(s) Oral daily  chlorhexidine 2% Cloths 1 Application(s) Topical <User Schedule>  dextrose 5%. 1000 milliLiter(s) (50 mL/Hr) IV Continuous <Continuous>  dextrose 5%. 1000 milliLiter(s) (100 mL/Hr) IV Continuous <Continuous>  dextrose 50% Injectable 25 Gram(s) IV Push once  dextrose 50% Injectable 25 Gram(s) IV Push once  gabapentin 400 milliGRAM(s) Oral daily  glucagon  Injectable 1 milliGRAM(s) IntraMuscular once  insulin glargine Injectable (LANTUS) 20 Unit(s) SubCutaneous at bedtime  insulin lispro (ADMELOG) corrective regimen sliding scale   SubCutaneous three times a day before meals  insulin lispro Injectable (ADMELOG) 7 Unit(s) SubCutaneous three times a day before meals  lactated ringers. 1000 milliLiter(s) (50 mL/Hr) IV Continuous <Continuous>  levothyroxine 125 MICROGram(s) Oral daily  lisinopril 10 milliGRAM(s) Oral daily  magnesium sulfate  IVPB 1 Gram(s) IV Intermittent once  pantoprazole    Tablet 40 milliGRAM(s) Oral daily  rosuvastatin 40 milliGRAM(s) Oral at bedtime  ticagrelor 90 milliGRAM(s) Oral every 12 hours    MEDICATIONS  (PRN):  acetaminophen     Tablet .. 650 milliGRAM(s) Oral every 6 hours PRN Mild Pain (1 - 3)  bisacodyl 5 milliGRAM(s) Oral daily PRN Constipation  dextrose Oral Gel 15 Gram(s) Oral once PRN Blood Glucose LESS THAN 70 milliGRAM(s)/deciliter      HOME MEDICATIONS:  aspirin 81 mg oral tablet, chewable (05-20)  Budeprion  mg/12 hours oral tablet, extended release (05-20)  finasteride 5 mg oral tablet (05-20)  gabapentin 400 mg oral tablet (05-20)  HumaLOG Mix 75/25 Pen subcutaneous suspension (05-20)  levothyroxine 150 mcg (0.15 mg) oral tablet (05-20)  lisinopril 10 mg oral tablet (05-20)  pantoprazole 40 mg oral delayed release tablet (05-20)  rosuvastatin 40 mg oral tablet (05-20)  sodium bicarbonate (05-20)  ticagrelor 90 mg oral tablet (05-20)      PHYSICAL EXAM:  On exam  General: awake, alert, NAD, symmetric face    Lungs:  clear to ausculations b/l, normal resp effort  Heart: regular ryhthm   Abdomen: soft, non tender non distended  Ext: no edema, can move all  his extremities, left forearmed ecchymosis dressing , equal radial pulse   neuro no gross focal

## 2025-05-22 NOTE — CONSULT NOTE ADULT - TIME BILLING
time spent on review of labs, imaging studies, old records, obtaining history, personally examining patient, counselling and communicating with patient, discussions with other health care providers, documentation in electronic health records, independent interpretation of labs, imaging/procedure results and care coordination.

## 2025-05-22 NOTE — PROGRESS NOTE ADULT - SUBJECTIVE AND OBJECTIVE BOX
----------Daily Progress Note----------    Neurology Stroke Progress Note    INTERVAL HOSPITAL COURSE / OVERNIGHT EVENTS:      <<<<<PAST MEDICAL & SURGICAL HISTORY>>>>>  HTN (hypertension)    History of TIAs    Hypothyroid    Arthritis    DM (diabetes mellitus)    CORTEZ on CPAP    Stage 3 chronic kidney disease    H/O total knee replacement    H/O carpal tunnel repair    History of trigger finger      ALLERGIES  clindamycin (Rash)      Home Medications:  Budeprion  mg/12 hours oral tablet, extended release: 1 tab(s) orally once a day (20 May 2025 09:54)  finasteride 5 mg oral tablet: 1 tablet orally once a day (20 May 2025 09:54)  gabapentin 400 mg oral tablet: 1 tab(s) orally 2 times a day (20 May 2025 09:54)  HumaLOG Mix 75/25 Pen subcutaneous suspension: subcutaneous 2 times a day (20 May 2025 09:54)  levothyroxine 150 mcg (0.15 mg) oral tablet: 1 tablet orally once a day (20 May 2025 09:54)  lisinopril 10 mg oral tablet: 1 tab(s) orally once a day (20 May 2025 10:34)  sodium bicarbonate: 1 2 times a day (20 May 2025 09:54)        MEDICATIONS  STANDING MEDICATIONS  aspirin  chewable 81 milliGRAM(s) Oral daily  chlorhexidine 2% Cloths 1 Application(s) Topical <User Schedule>  dextrose 5%. 1000 milliLiter(s) IV Continuous <Continuous>  dextrose 5%. 1000 milliLiter(s) IV Continuous <Continuous>  dextrose 50% Injectable 25 Gram(s) IV Push once  dextrose 50% Injectable 25 Gram(s) IV Push once  gabapentin 400 milliGRAM(s) Oral daily  glucagon  Injectable 1 milliGRAM(s) IntraMuscular once  insulin glargine Injectable (LANTUS) 20 Unit(s) SubCutaneous at bedtime  insulin lispro (ADMELOG) corrective regimen sliding scale   SubCutaneous three times a day before meals  insulin lispro Injectable (ADMELOG) 7 Unit(s) SubCutaneous three times a day before meals  lactated ringers. 1000 milliLiter(s) IV Continuous <Continuous>  levothyroxine 125 MICROGram(s) Oral daily  lisinopril 10 milliGRAM(s) Oral daily  pantoprazole    Tablet 40 milliGRAM(s) Oral daily  rosuvastatin 40 milliGRAM(s) Oral at bedtime  ticagrelor 90 milliGRAM(s) Oral every 12 hours    PRN MEDICATIONS  bisacodyl 5 milliGRAM(s) Oral daily PRN  dextrose Oral Gel 15 Gram(s) Oral once PRN    VITALS:  T(F): 96.6  HR: 86  BP: 149/72  RR: 16  SpO2: 97%    <<<<<NEURO EXAM>>>>>  General:  Appearance is consistent with chronologic age.  No abnormal facies.  Gross skin survey within normal limits.    Cognitive/Language:  The patient is oriented to person, place, time and date.  Recent and remote memory intact.  Fund of knowledge is intact and normal.  Language with normal repetition, comprehension and naming.  Nondysarthric.    Eyes: intact VA, VFF.  EOMI w/o nystagmus, skew or reported double vision.  PERRL.  No ptosis/weakness of eyelid closure.    Face:  Facial sensation normal V1 - 3, no facial asymmetry.    Ears/Nose/Throat:  Hearing grossly intact b/l.  Palate elevates midline.  Tongue and uvula midline.   Motor examination:   Normal tone, bulk and range of motion.  No tenderness, twitching, tremors or involuntary movements.  Strength Exam (MRC scale): 5/5 BL Shoulder abduction, Shoulder elevation, Elbow flexion, Elbow elevation, Wrist extension, Wrist flexion, Wrist Abduction, Wrist Adduction, Hand  strength, Hip flexion, Hip abduction, Hip adduction, Knee flexion, Knee extension, Ankle plantarflexion, Ankle dorsiflexion, Ankle inversion, Ankle eversion  Reflexes:   2+ b/l biceps, triceps, brachioradialis, patella and Achilles.  Plantar response downgoing b/l.  Jaw jerk, Laurie, clonus absent.  Sensory examination:   Intact to light touch and pinprick, pain, temperature and proprioception and vibration in all extremities.  Cerebellum:   FTN/HKS intact with normal TRAVIS in all limbs.  No dysmetria or dysdiadochokinesia.    Gait: Narrow based and normal.    NIHSS:    <<<<<LABS>>>>>                        8.7    3.04  )-----------( 153      ( 21 May 2025 16:23 )             25.3     05-21    137  |  106  |  28[H]  ----------------------------<  328[H]  5.3[H]   |  23  |  2.3[H]    Ca    8.9      21 May 2025 11:14  Phos  4.1     05-21  Mg     2.1     05-21    TPro  5.0[L]  /  Alb  3.4[L]  /  TBili  0.3  /  DBili  x   /  AST  26  /  ALT  21  /  AlkPhos  114  05-21    PT/INR - ( 20 May 2025 13:24 )   PT: 12.40 sec;   INR: 1.05 ratio         PTT - ( 20 May 2025 13:24 )  PTT:>200.0 sec      ----------------------------------------------------------------------------------------------------------------------------------------------------------------------------------------------- ----------Daily Progress Note----------    Neurology Stroke Progress Note    INTERVAL HOSPITAL COURSE / OVERNIGHT EVENTS:  No overnight events, denies any new numbness, weakness, speech issues, vision issues, or dizziness after her    <<<<<PAST MEDICAL & SURGICAL HISTORY>>>>>  HTN (hypertension)    History of TIAs    Hypothyroid    Arthritis    DM (diabetes mellitus)    CORTEZ on CPAP    Stage 3 chronic kidney disease    H/O total knee replacement    H/O carpal tunnel repair    History of trigger finger      ALLERGIES  clindamycin (Rash)      Home Medications:  Budeprion  mg/12 hours oral tablet, extended release: 1 tab(s) orally once a day (20 May 2025 09:54)  finasteride 5 mg oral tablet: 1 tablet orally once a day (20 May 2025 09:54)  gabapentin 400 mg oral tablet: 1 tab(s) orally 2 times a day (20 May 2025 09:54)  HumaLOG Mix 75/25 Pen subcutaneous suspension: subcutaneous 2 times a day (20 May 2025 09:54)  levothyroxine 150 mcg (0.15 mg) oral tablet: 1 tablet orally once a day (20 May 2025 09:54)  lisinopril 10 mg oral tablet: 1 tab(s) orally once a day (20 May 2025 10:34)  sodium bicarbonate: 1 2 times a day (20 May 2025 09:54)        MEDICATIONS  STANDING MEDICATIONS  aspirin  chewable 81 milliGRAM(s) Oral daily  chlorhexidine 2% Cloths 1 Application(s) Topical <User Schedule>  dextrose 5%. 1000 milliLiter(s) IV Continuous <Continuous>  dextrose 5%. 1000 milliLiter(s) IV Continuous <Continuous>  dextrose 50% Injectable 25 Gram(s) IV Push once  dextrose 50% Injectable 25 Gram(s) IV Push once  gabapentin 400 milliGRAM(s) Oral daily  glucagon  Injectable 1 milliGRAM(s) IntraMuscular once  insulin glargine Injectable (LANTUS) 20 Unit(s) SubCutaneous at bedtime  insulin lispro (ADMELOG) corrective regimen sliding scale   SubCutaneous three times a day before meals  insulin lispro Injectable (ADMELOG) 7 Unit(s) SubCutaneous three times a day before meals  lactated ringers. 1000 milliLiter(s) IV Continuous <Continuous>  levothyroxine 125 MICROGram(s) Oral daily  lisinopril 10 milliGRAM(s) Oral daily  pantoprazole    Tablet 40 milliGRAM(s) Oral daily  rosuvastatin 40 milliGRAM(s) Oral at bedtime  ticagrelor 90 milliGRAM(s) Oral every 12 hours    PRN MEDICATIONS  bisacodyl 5 milliGRAM(s) Oral daily PRN  dextrose Oral Gel 15 Gram(s) Oral once PRN    VITALS:  T(F): 96.6  HR: 86  BP: 149/72  RR: 16  SpO2: 97%    <<<<<NEURO EXAM>>>>>  General:  Appearance is consistent with chronologic age.  No abnormal facies.  Gross skin survey within normal limits.    Cognitive/Language:  The patient is oriented to person, place, time and date.  Recent and remote memory intact.  Fund of knowledge is intact and normal.  Language with normal repetition, comprehension and naming.  Nondysarthric.    Eyes: intact VA, VFF.  EOMI w/o nystagmus, skew or reported double vision.  PERRL.  No ptosis/weakness of eyelid closure.    Face:  Facial sensation normal V1 - 3, no facial asymmetry.    Ears/Nose/Throat:  Hearing grossly intact b/l.  Palate elevates midline.  Tongue and uvula midline.   Motor examination:   Normal tone, bulk and range of motion.  No tenderness, twitching, tremors or involuntary movements.  Strength Exam (MRC scale): 5/5 BL Shoulder abduction, Shoulder elevation, Elbow flexion, Elbow elevation, Wrist extension, Wrist flexion, Wrist Abduction, Wrist Adduction, Hand  strength, Hip flexion, Hip abduction, Hip adduction, Knee flexion, Knee extension, Ankle plantarflexion, Ankle dorsiflexion, Ankle inversion, Ankle eversion  Reflexes:   2+ b/l biceps, triceps, brachioradialis, patella and Achilles.  Plantar response downgoing b/l.  Jaw jerk, Laurie, clonus absent.  Sensory examination:   Intact to light touch and pinprick, pain, temperature and proprioception and vibration in all extremities.  Cerebellum:   FTN/HKS intact with normal TRAVIS in all limbs.  No dysmetria or dysdiadochokinesia.    Gait: Narrow based and normal.    NIHSS:    <<<<<LABS>>>>>                        8.7    3.04  )-----------( 153      ( 21 May 2025 16:23 )             25.3     05-21    137  |  106  |  28[H]  ----------------------------<  328[H]  5.3[H]   |  23  |  2.3[H]    Ca    8.9      21 May 2025 11:14  Phos  4.1     05-21  Mg     2.1     05-21    TPro  5.0[L]  /  Alb  3.4[L]  /  TBili  0.3  /  DBili  x   /  AST  26  /  ALT  21  /  AlkPhos  114  05-21    PT/INR - ( 20 May 2025 13:24 )   PT: 12.40 sec;   INR: 1.05 ratio         PTT - ( 20 May 2025 13:24 )  PTT:>200.0 sec      ----------------------------------------------------------------------------------------------------------------------------------------------------------------------------------------------- ----------Daily Progress Note----------    Neurology Stroke Progress Note    INTERVAL HOSPITAL COURSE / OVERNIGHT EVENTS:  No overnight events, denies any new numbness, weakness, speech issues, vision issues, or dizziness after her stent placement, reports feeling well    <<<<<PAST MEDICAL & SURGICAL HISTORY>>>>>  HTN (hypertension)    History of TIAs    Hypothyroid    Arthritis    DM (diabetes mellitus)    CORTEZ on CPAP    Stage 3 chronic kidney disease    H/O total knee replacement    H/O carpal tunnel repair    History of trigger finger      ALLERGIES  clindamycin (Rash)      Home Medications:  Budeprion  mg/12 hours oral tablet, extended release: 1 tab(s) orally once a day (20 May 2025 09:54)  finasteride 5 mg oral tablet: 1 tablet orally once a day (20 May 2025 09:54)  gabapentin 400 mg oral tablet: 1 tab(s) orally 2 times a day (20 May 2025 09:54)  HumaLOG Mix 75/25 Pen subcutaneous suspension: subcutaneous 2 times a day (20 May 2025 09:54)  levothyroxine 150 mcg (0.15 mg) oral tablet: 1 tablet orally once a day (20 May 2025 09:54)  lisinopril 10 mg oral tablet: 1 tab(s) orally once a day (20 May 2025 10:34)  sodium bicarbonate: 1 2 times a day (20 May 2025 09:54)        MEDICATIONS  STANDING MEDICATIONS  aspirin  chewable 81 milliGRAM(s) Oral daily  chlorhexidine 2% Cloths 1 Application(s) Topical <User Schedule>  dextrose 5%. 1000 milliLiter(s) IV Continuous <Continuous>  dextrose 5%. 1000 milliLiter(s) IV Continuous <Continuous>  dextrose 50% Injectable 25 Gram(s) IV Push once  dextrose 50% Injectable 25 Gram(s) IV Push once  gabapentin 400 milliGRAM(s) Oral daily  glucagon  Injectable 1 milliGRAM(s) IntraMuscular once  insulin glargine Injectable (LANTUS) 20 Unit(s) SubCutaneous at bedtime  insulin lispro (ADMELOG) corrective regimen sliding scale   SubCutaneous three times a day before meals  insulin lispro Injectable (ADMELOG) 7 Unit(s) SubCutaneous three times a day before meals  lactated ringers. 1000 milliLiter(s) IV Continuous <Continuous>  levothyroxine 125 MICROGram(s) Oral daily  lisinopril 10 milliGRAM(s) Oral daily  pantoprazole    Tablet 40 milliGRAM(s) Oral daily  rosuvastatin 40 milliGRAM(s) Oral at bedtime  ticagrelor 90 milliGRAM(s) Oral every 12 hours    PRN MEDICATIONS  bisacodyl 5 milliGRAM(s) Oral daily PRN  dextrose Oral Gel 15 Gram(s) Oral once PRN    VITALS:  T(F): 96.6  HR: 86  BP: 149/72  RR: 16  SpO2: 97%    <<<<<NEURO EXAM>>>>>  General: Appearance is consistent with chronologic age. No abnormal facies.  Cognitive/Language: The patient is oriented to person, place, time and date. Recent and remote memory intact. Language with normal repetition, comprehension and naming. Nondysarthric.    Eyes: VFF. Has chronic L eye abduction palsy but otherwise EOMI w/o nystagmus or reported double vision. PERRL. No ptosis/weakness of eyelid closure.    Face: Facial sensation normal V1 - 3, no facial asymmetry.    Ears/Nose/Throat: Hearing grossly intact b/l. Palate elevates midline. Tongue and uvula midline.   Motor examination: Normal tone. No tremors or involuntary movements.  Strength Exam (MRC scale): 5/5 BL UE and LE strength  Reflexes: 2+ b/l biceps, triceps, brachioradialis, patellar and Achilles reflexes. Plantar response downgoing b/l.  Sensory examination: Intact to light touch, temperature, and vibration in all extremities.  Cerebellum: FTN/HKS intact. No dysmetria or dysdiadochokinesia.    Gait: Able to walk with a walker, wide based    NIHSS: 0    <<<<<LABS>>>>>                        8.7    3.04  )-----------( 153      ( 21 May 2025 16:23 )             25.3     05-21    137  |  106  |  28[H]  ----------------------------<  328[H]  5.3[H]   |  23  |  2.3[H]    Ca    8.9      21 May 2025 11:14  Phos  4.1     05-21  Mg     2.1     05-21    TPro  5.0[L]  /  Alb  3.4[L]  /  TBili  0.3  /  DBili  x   /  AST  26  /  ALT  21  /  AlkPhos  114  05-21    PT/INR - ( 20 May 2025 13:24 )   PT: 12.40 sec;   INR: 1.05 ratio         PTT - ( 20 May 2025 13:24 )  PTT:>200.0 sec      -----------------------------------------------------------------------------------------------------------------------------------------------------------------------------------------------

## 2025-05-23 ENCOUNTER — TRANSCRIPTION ENCOUNTER (OUTPATIENT)
Age: 75
End: 2025-05-23

## 2025-05-23 VITALS
HEART RATE: 78 BPM | SYSTOLIC BLOOD PRESSURE: 138 MMHG | OXYGEN SATURATION: 99 % | TEMPERATURE: 98 F | DIASTOLIC BLOOD PRESSURE: 65 MMHG | RESPIRATION RATE: 18 BRPM

## 2025-05-23 LAB
ANION GAP SERPL CALC-SCNC: 9 MMOL/L — SIGNIFICANT CHANGE UP (ref 7–14)
BUN SERPL-MCNC: 33 MG/DL — HIGH (ref 10–20)
CALCIUM SERPL-MCNC: 9.5 MG/DL — SIGNIFICANT CHANGE UP (ref 8.4–10.5)
CHLORIDE SERPL-SCNC: 106 MMOL/L — SIGNIFICANT CHANGE UP (ref 98–110)
CO2 SERPL-SCNC: 26 MMOL/L — SIGNIFICANT CHANGE UP (ref 17–32)
CREAT SERPL-MCNC: 2.1 MG/DL — HIGH (ref 0.7–1.5)
EGFR: 24 ML/MIN/1.73M2 — LOW
EGFR: 24 ML/MIN/1.73M2 — LOW
GLUCOSE BLDC GLUCOMTR-MCNC: 175 MG/DL — HIGH (ref 70–99)
GLUCOSE BLDC GLUCOMTR-MCNC: 179 MG/DL — HIGH (ref 70–99)
GLUCOSE SERPL-MCNC: 147 MG/DL — HIGH (ref 70–99)
HCT VFR BLD CALC: 25.9 % — LOW (ref 37–47)
HGB BLD-MCNC: 8.6 G/DL — LOW (ref 12–16)
MAGNESIUM SERPL-MCNC: 1.7 MG/DL — LOW (ref 1.8–2.4)
MCHC RBC-ENTMCNC: 33.2 G/DL — SIGNIFICANT CHANGE UP (ref 32–37)
MCHC RBC-ENTMCNC: 36 PG — HIGH (ref 27–31)
MCV RBC AUTO: 108.4 FL — HIGH (ref 81–99)
NRBC BLD AUTO-RTO: 0 /100 WBCS — SIGNIFICANT CHANGE UP (ref 0–0)
PLATELET # BLD AUTO: 153 K/UL — SIGNIFICANT CHANGE UP (ref 130–400)
PMV BLD: 9.3 FL — SIGNIFICANT CHANGE UP (ref 7.4–10.4)
POTASSIUM SERPL-MCNC: 4.9 MMOL/L — SIGNIFICANT CHANGE UP (ref 3.5–5)
POTASSIUM SERPL-SCNC: 4.9 MMOL/L — SIGNIFICANT CHANGE UP (ref 3.5–5)
RBC # BLD: 2.39 M/UL — LOW (ref 4.2–5.4)
RBC # FLD: 14.1 % — SIGNIFICANT CHANGE UP (ref 11.5–14.5)
SODIUM SERPL-SCNC: 141 MMOL/L — SIGNIFICANT CHANGE UP (ref 135–146)
WBC # BLD: 3.91 K/UL — LOW (ref 4.8–10.8)
WBC # FLD AUTO: 3.91 K/UL — LOW (ref 4.8–10.8)

## 2025-05-23 PROCEDURE — 99238 HOSP IP/OBS DSCHRG MGMT 30/<: CPT

## 2025-05-23 PROCEDURE — 99232 SBSQ HOSP IP/OBS MODERATE 35: CPT

## 2025-05-23 RX ORDER — AMLODIPINE BESYLATE 10 MG/1
1 TABLET ORAL
Qty: 30 | Refills: 0
Start: 2025-05-23 | End: 2025-06-21

## 2025-05-23 RX ORDER — MAGNESIUM SULFATE 500 MG/ML
2 SYRINGE (ML) INJECTION ONCE
Refills: 0 | Status: COMPLETED | OUTPATIENT
Start: 2025-05-23 | End: 2025-05-23

## 2025-05-23 RX ORDER — LEVOTHYROXINE SODIUM 300 MCG
1 TABLET ORAL
Qty: 30 | Refills: 0
Start: 2025-05-23 | End: 2025-06-21

## 2025-05-23 RX ADMIN — LISINOPRIL 10 MILLIGRAM(S): 5 TABLET ORAL at 05:38

## 2025-05-23 RX ADMIN — INSULIN LISPRO 2: 100 INJECTION, SOLUTION INTRAVENOUS; SUBCUTANEOUS at 11:57

## 2025-05-23 RX ADMIN — Medication 650 MILLIGRAM(S): at 09:05

## 2025-05-23 RX ADMIN — AMLODIPINE BESYLATE 5 MILLIGRAM(S): 10 TABLET ORAL at 05:39

## 2025-05-23 RX ADMIN — GABAPENTIN 400 MILLIGRAM(S): 400 CAPSULE ORAL at 11:59

## 2025-05-23 RX ADMIN — Medication 81 MILLIGRAM(S): at 11:58

## 2025-05-23 RX ADMIN — Medication 25 GRAM(S): at 12:03

## 2025-05-23 RX ADMIN — Medication 650 MILLIGRAM(S): at 09:41

## 2025-05-23 RX ADMIN — INSULIN LISPRO 9 UNIT(S): 100 INJECTION, SOLUTION INTRAVENOUS; SUBCUTANEOUS at 08:20

## 2025-05-23 RX ADMIN — TICAGRELOR 90 MILLIGRAM(S): 90 TABLET ORAL at 05:39

## 2025-05-23 RX ADMIN — Medication 125 MICROGRAM(S): at 05:38

## 2025-05-23 RX ADMIN — INSULIN LISPRO 9 UNIT(S): 100 INJECTION, SOLUTION INTRAVENOUS; SUBCUTANEOUS at 12:01

## 2025-05-23 RX ADMIN — Medication 40 MILLIGRAM(S): at 11:59

## 2025-05-23 RX ADMIN — Medication 1 APPLICATION(S): at 05:41

## 2025-05-23 RX ADMIN — INSULIN LISPRO 2: 100 INJECTION, SOLUTION INTRAVENOUS; SUBCUTANEOUS at 08:20

## 2025-05-23 NOTE — PROGRESS NOTE ADULT - SUBJECTIVE AND OBJECTIVE BOX
----------Daily Progress Note----------    Neurology Stroke Progress Note    INTERVAL HOSPITAL COURSE / OVERNIGHT EVENTS:  No overnight events, reports feeling well, denies any new issues    <<<<<PAST MEDICAL & SURGICAL HISTORY>>>>>  HTN (hypertension)    History of TIAs    Hypothyroid    Arthritis    DM (diabetes mellitus)    CORTEZ on CPAP    Stage 3 chronic kidney disease    H/O total knee replacement    H/O carpal tunnel repair    History of trigger finger      ALLERGIES  clindamycin (Rash)      Home Medications:  Budeprion  mg/12 hours oral tablet, extended release: 1 tab(s) orally once a day (20 May 2025 09:54)  finasteride 5 mg oral tablet: 1 tablet orally once a day (20 May 2025 09:54)  gabapentin 400 mg oral tablet: 1 tab(s) orally 2 times a day (20 May 2025 09:54)  HumaLOG Mix 75/25 Pen subcutaneous suspension: subcutaneous 2 times a day (20 May 2025 09:54)  levothyroxine 150 mcg (0.15 mg) oral tablet: 1 tablet orally once a day (20 May 2025 09:54)  lisinopril 10 mg oral tablet: 1 tab(s) orally once a day (20 May 2025 10:34)  sodium bicarbonate: 1 2 times a day (20 May 2025 09:54)        MEDICATIONS  STANDING MEDICATIONS  amLODIPine   Tablet 5 milliGRAM(s) Oral daily  aspirin  chewable 81 milliGRAM(s) Oral daily  chlorhexidine 2% Cloths 1 Application(s) Topical <User Schedule>  dextrose 5%. 1000 milliLiter(s) IV Continuous <Continuous>  dextrose 5%. 1000 milliLiter(s) IV Continuous <Continuous>  dextrose 50% Injectable 25 Gram(s) IV Push once  dextrose 50% Injectable 25 Gram(s) IV Push once  enoxaparin Injectable 40 milliGRAM(s) SubCutaneous every 24 hours  gabapentin 400 milliGRAM(s) Oral daily  glucagon  Injectable 1 milliGRAM(s) IntraMuscular once  insulin glargine Injectable (LANTUS) 27 Unit(s) SubCutaneous at bedtime  insulin lispro (ADMELOG) corrective regimen sliding scale   SubCutaneous three times a day before meals  insulin lispro Injectable (ADMELOG) 9 Unit(s) SubCutaneous three times a day before meals  lactated ringers. 1000 milliLiter(s) IV Continuous <Continuous>  levothyroxine 125 MICROGram(s) Oral daily  lisinopril 10 milliGRAM(s) Oral daily  pantoprazole    Tablet 40 milliGRAM(s) Oral daily  rosuvastatin 40 milliGRAM(s) Oral at bedtime  ticagrelor 90 milliGRAM(s) Oral every 12 hours    PRN MEDICATIONS  acetaminophen     Tablet .. 650 milliGRAM(s) Oral every 6 hours PRN  bisacodyl 5 milliGRAM(s) Oral daily PRN  dextrose Oral Gel 15 Gram(s) Oral once PRN    VITALS:  T(F): 98.5  HR: 83  BP: 130/76  RR: 18  SpO2: 95%    <<<<<NEURO EXAM>>>>>  General: Appearance is consistent with chronologic age. No abnormal facies.  Cognitive/Language: The patient is oriented to person, place, time and date. Recent and remote memory intact. Language with normal repetition, comprehension and naming. Nondysarthric.    Eyes: VFF. Has chronic L eye abduction palsy but otherwise EOMI w/o nystagmus or reported double vision. PERRL. No ptosis/weakness of eyelid closure.    Face: Facial sensation normal V1 - 3, no facial asymmetry.    Ears/Nose/Throat: Hearing grossly intact b/l. Palate elevates midline. Tongue and uvula midline.   Motor examination: Normal tone. No tremors or involuntary movements.  Strength Exam (MRC scale): 5/5 BL UE and LE strength  Reflexes: 2+ b/l biceps, triceps, brachioradialis, patellar and Achilles reflexes. Plantar response downgoing b/l.  Sensory examination: Intact to light touch, temperature, and vibration in all extremities.  Cerebellum: FTN/HKS intact. No dysmetria or dysdiadochokinesia.    Gait: Able to walk with a walker, wide based    NIHSS: 0    <<<<<LABS>>>>>                        8.6    3.91  )-----------( 153      ( 23 May 2025 06:23 )             25.9     05-22    139  |  107  |  31[H]  ----------------------------<  224[H]  4.9   |  24  |  2.2[H]    Ca    9.2      22 May 2025 05:57  Phos  3.0     05-22  Mg     1.7     05-22    TPro  5.7[L]  /  Alb  3.4[L]  /  TBili  0.2  /  DBili  x   /  AST  36  /  ALT  30  /  AlkPhos  147[H]  05-22    -----------------------------------------------------------------------------------------------------------------------------------------------------------------------------------------------

## 2025-05-23 NOTE — PROGRESS NOTE ADULT - TIME BILLING
Lab Results   Component Value Date    HGBA1C 5 4 04/07/2021     Normal A1c  Sees Endo 
time spent on review of labs, imaging studies, old records, obtaining history, personally examining patient, counselling and communicating with patient, discussions with other health care providers, documentation in electronic health records, independent interpretation of labs, imaging/procedure results and care coordination.
Review of imaging and chart; obtaining history; examination of pt; discussion and coordination of care.
Review of imaging and chart; obtaining history; examination of pt; discussion and coordination of care.

## 2025-05-23 NOTE — DISCHARGE NOTE NURSING/CASE MANAGEMENT/SOCIAL WORK - NSDCFUADDAPPT_GEN_ALL_CORE_FT
Please call the endocrinologist for an appointment (Dr. Barber's office).  Please call Dr. Graf for an appointment within 2 weeks.

## 2025-05-23 NOTE — PROGRESS NOTE ADULT - ASSESSMENT
74-year-old female with a PMHx of left trigger finger, AS, MR, osteoporosis, DM with Charcot foot, and HTN, who was evaluated in the neurosurgery outpatient office due to known focal severe left M1 Middle Cerebral Artery narrowing (confirmed on MRA 5/12/2025).   Pt admitted transferred from [facility] TO  stroke unit under Neuro - Medicine called to co-manage     [] known focal severe left M1 Middle Cerebral Artery narrowing-S/P diagnostic cerebral angiogram + MCA angioplasty and stenting 5/20/2025  [] Roger on Chronic kidney disease 4  [] recent CVA (MCA stenosis) s/p ILR    [] HTN   []DM   [] Hypothyroid  [] Leukopenia / Macrocytic anemia  []HLD     Pt stated that she used to be lisinopril 20 mg- HCTZ , But was recently changed to Only Lisinopril 10 mg as per her kidney doctor   pt aware of the need to f/u with her PCP and repeat her blood work including CBC, CMP , electrolytes with in one week   can add amlodipine 5 mg daily if blood pressure > 140 until she follows with her primary doctor , risks discussed and she agreed   -she stated that her FS at home range between 70 to 280 usually depend on the diet   encouraged healthy diet and low K diet and she stated understanding   - a1c 8.1 c/w monitor FS and adjust lantus / Lispro as needed   uses Humalog mix 75/25 at home , can resume on discharge   - TSH 0.02, Free t4 1.8 Levothyroxine decrease by the NSICU from 150 to 125 mcg  continue 125 mcg on discharge and repeat thyroid panel in 3-4 weeks   Outpatient endocrinology    strickt I/Os  avoid nephrotoxins   Creatinine Trend: 2.2<--, 2.3<--, 2.4<--, 2.4<--, 2.4<--, 2.2<--  adjust meds renally dosed   Monitor CBC and Outpatient Hematology for Macrocytic anemia and leukopenia   monitor Alk phos   outpatient pulm f/u for CORTEZ   Aspirin/ Brilinta statin as per neuro    , 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 Sclerotic aortic valve with normal opening.  outpatient f/u with her cardiologist   outpatient f/u with her PCP and nephro      #Nutrition/Fluids/Electrolytes   - replete K<4 and Mg <2  - ensure regular BMs  - consider Miralax BID, Senna    #DVT Px  SCDs  Heparin       Chart and notes personally reviewed.  Care Discussed with Consultants/Other Providers/ Housestaff [ x] YES [ ] NO   Radiology, labs, old records personally reviewed.    discussed w/ housestaff, nursing, case management, neuro    Time-based billing (NON-critical care).     50 minutes spent on total encounter. The necessity of the time spent during the encounter on this date of service was due to:     time spent on review of labs, imaging studies, old records, obtaining history, personally examining patient, counselling and communicating with patient/ family, entering orders for medications/tests/etc, discussions with other health care providers, documentation in electronic health records, independent interpretation of labs, imaging/procedure results and care coordination.

## 2025-05-23 NOTE — PROGRESS NOTE ADULT - ATTENDING COMMENTS
Pt is a 73 yo F with PMHx of HTN, HLD, DM, CORTEZ (noncompliant with CPAP), remote smoker, aortic stenosis, left lentiform nucleus/corona radiata small scattered strokes (03/2025) due to L MCA focal severe stenosis, who presented for elective L MCA stenting    Impr: S/p Left MCA angioplasty and stenting 5/20  Cr and Hb stable  Optimize glucose and BP control  Continue ASA/brilinta/statin  SBP goal 100-140  Possible d/c home today vs tomorrow
Pt is a 73 yo F with PMHx of HTN, HLD, DM, CORTEZ (noncompliant with CPAP), remote smoker, aortic stenosis, left lentiform nucleus/corona radiata small scattered strokes (03/2025) due to L MCA focal severe stenosis, who presented for elective L MCA stenting    Impr: S/p Left MCA angioplasty and stenting 5/20  Cr and Hb stable  Optimize glucose and BP control  Continue ASA/brilinta/statin

## 2025-05-23 NOTE — PROGRESS NOTE ADULT - ASSESSMENT
Patient is a 73 yo F w/PMHx of left trigger finger, AS, MR, osteoporosis, DM with Charcot foot, HTN, and three prior stroke s/p loop recorder placement who was evaluated in the neurosurgery outpatient office due to known focal severe left M1 Middle Cerebral Artery narrowing (confirmed on MRA 5/12/2025). She has had three prior strokes while on Aspirin and Plavix, however she was found to be subtherapeutic on Plavix and subsequently switched to Brilinta. She is also on Crestor 4mg and is s/p loop recorder placement. The patient had an MRA NOVA 5 /12/2025 and now presents for a diagnostic cerebral angiogram. NPO except medication after midnight last night. IV normal saline on arrival. Brilinta 90mg bid and Aspirin 81mg daily PO last dose this morning.    #Severe L MCA stenosis s/p left MCA stent x1 and balloon angioplasty via right radial arteriotomy.   - NIHSS checks q8h  - c/w Aspirin 81mg daily and Brilinta 90mg q 12 hours  - c/w home rosuvastatin 40 mg nightly  - -140  - Home PT as per PT recs    #HTN  - c/w lisinopril 10 mg daily  - c/w amlodipine 5 mg daily, increase to 10 mg if needed    #Sleep apnea   - Noncompliant with CPAP    #BARBER on CKD (resolving)  - Cr downtrending  - f/u AM Cr    #GERD   - c/w home Protonix     #Anemia of Chronic Disease  - HGB stable    #Type 2 DM  - On home humalog 75/25 bid  - Increased lispro to 9 u TIDAC and lantus 27 u qHS  - mISS and FS     Hypothyroid  - TSH- .02 and Free T4- 1.8 decrease dose 125mcg /day   - F/U TSH and Free T4 in one mo outpatient with endocrinology f/u    #MISC  - DVT PPx: Lovenox  - Diet: DASH/CC  - Dispo: Home with home PT Patient is a 73 yo F w/PMHx of left trigger finger, AS, MR, osteoporosis, DM with Charcot foot, HTN, and three prior stroke s/p loop recorder placement who was evaluated in the neurosurgery outpatient office due to known focal severe left M1 Middle Cerebral Artery narrowing (confirmed on MRA 5/12/2025). She has had three prior strokes while on Aspirin and Plavix, however she was found to be subtherapeutic on Plavix and subsequently switched to Brilinta. She is also on Crestor 4mg and is s/p loop recorder placement. The patient had an MRA NOVA 5 /12/2025 and now s/p L MCA stent and balloon angioplasty POD#2. Her exam as been stable and she is now ready for discharge.    #Severe L MCA stenosis s/p left MCA stent x1 and balloon angioplasty via right radial arteriotomy.   - NIHSS checks q8h  - c/w Aspirin 81mg daily and Brilinta 90mg q 12 hours  - c/w home rosuvastatin 40 mg nightly  - -140  - Home PT as per PT recs    #HTN  - c/w lisinopril 10 mg daily  - c/w amlodipine 5 mg daily, increase to 10 mg if needed    #Sleep apnea   - Noncompliant with CPAP    #BARBER on CKD (resolving)  - Cr downtrending  - f/u AM Cr    #GERD   - c/w home Protonix     #Anemia of Chronic Disease  - HGB stable    #Type 2 DM  - On home humalog 75/25 bid  - Increased lispro to 9 u TIDAC and lantus 27 u qHS  - mISS and FS     Hypothyroid  - TSH- .02 and Free T4- 1.8 decrease dose 125mcg /day   - F/U TSH and Free T4 in one mo outpatient with endocrinology f/u    #MISC  - DVT PPx: Lovenox  - Diet: DASH/CC  - Dispo: Home with home PT

## 2025-05-23 NOTE — DISCHARGE NOTE NURSING/CASE MANAGEMENT/SOCIAL WORK - NSDCPEFALRISK_GEN_ALL_CORE
For information on Fall & Injury Prevention, visit: [email or web address] OR  [email or web address] OR  https://www.cdc.gov/steadi/patient.html

## 2025-05-23 NOTE — DISCHARGE NOTE NURSING/CASE MANAGEMENT/SOCIAL WORK - FINANCIAL ASSISTANCE
[facility] provides services at a reduced cost to those who are determined to be eligible through [facility]’s financial assistance program. Information regarding [facility]’s financial assistance program can be found by going to [email or web address] or by calling [phone].

## 2025-05-23 NOTE — PROGRESS NOTE ADULT - REASON FOR ADMISSION
Intracranial angioplasty and stenting

## 2025-05-23 NOTE — PROGRESS NOTE ADULT - SUBJECTIVE AND OBJECTIVE BOX
Patient is a 74y old  Female who presents with a chief complaint of Intracranial angioplasty and stenting (23 May 2025 07:31)    INTERVAL HPI/OVERNIGHT EVENTS: Patient was examined and seen at bedside. This morning pt is resting comfortably in bed and reports no new issues or overnight events. No complaints, feels well.  ROS: Denies CP, SOB, AP, new weakness  All other systems reviewed and are within normal limits.  InitialHPI:  The patient is a 74-year-old female with a PMHx of left trigger finger, AS, MR, osteoporosis, DM with Charcot foot, and HTN, who was evaluated in the neurosurgery outpatient office due to known focal severe left M1 Middle Cerebral Artery narrowing (confirmed on MRA 5/12/2025). She has had three prior strokes while on Aspirin and Plavix, however she was found to be subtherapeutic on Plavix and subsequently switched to Brilinta. She is also s/p loop recorder placement. The patient had an MRA NOVA 5 /12/2025 and now presents for a diagnostic cerebral angiogram. NPO except medication after midnight last night. IV normal saline on arrival. Brilinta 90mg bid and Aspirin 81mg daily PO last dose this morning. (20 May 2025 09:48)    PAST MEDICAL & SURGICAL HISTORY:  HTN (hypertension)      History of TIAs      Hypothyroid      Arthritis      DM (diabetes mellitus)      CORTEZ on CPAP      Stage 3 chronic kidney disease      H/O total knee replacement      H/O carpal tunnel repair      History of trigger finger          General: NAD, AAO3  HEENT:  EOMI, no LAD  CV: S1 S2  Resp: decreased breath sounds at bases  GI: NT/ND/S +BS  MS: no clubbing/cyanosis/edema, + pulses b/l  Neuro: nonfocal, +reflexes thruout    tele: SR, nonspecific changes (on my own evaluation of tele monitor)    MEDICATIONS  (STANDING):  amLODIPine   Tablet 5 milliGRAM(s) Oral daily  aspirin  chewable 81 milliGRAM(s) Oral daily  chlorhexidine 2% Cloths 1 Application(s) Topical <User Schedule>  dextrose 5%. 1000 milliLiter(s) (50 mL/Hr) IV Continuous <Continuous>  dextrose 5%. 1000 milliLiter(s) (100 mL/Hr) IV Continuous <Continuous>  dextrose 50% Injectable 25 Gram(s) IV Push once  dextrose 50% Injectable 25 Gram(s) IV Push once  enoxaparin Injectable 40 milliGRAM(s) SubCutaneous every 24 hours  gabapentin 400 milliGRAM(s) Oral daily  glucagon  Injectable 1 milliGRAM(s) IntraMuscular once  insulin glargine Injectable (LANTUS) 27 Unit(s) SubCutaneous at bedtime  insulin lispro (ADMELOG) corrective regimen sliding scale   SubCutaneous three times a day before meals  insulin lispro Injectable (ADMELOG) 9 Unit(s) SubCutaneous three times a day before meals  lactated ringers. 1000 milliLiter(s) (50 mL/Hr) IV Continuous <Continuous>  levothyroxine 125 MICROGram(s) Oral daily  lisinopril 10 milliGRAM(s) Oral daily  pantoprazole    Tablet 40 milliGRAM(s) Oral daily  rosuvastatin 40 milliGRAM(s) Oral at bedtime  ticagrelor 90 milliGRAM(s) Oral every 12 hours    MEDICATIONS  (PRN):  acetaminophen     Tablet .. 650 milliGRAM(s) Oral every 6 hours PRN Mild Pain (1 - 3)  bisacodyl 5 milliGRAM(s) Oral daily PRN Constipation  dextrose Oral Gel 15 Gram(s) Oral once PRN Blood Glucose LESS THAN 70 milliGRAM(s)/deciliter    Vital Signs Last 24 Hrs  T(C): 36.8 (23 May 2025 13:16), Max: 36.9 (23 May 2025 04:54)  T(F): 98.3 (23 May 2025 13:16), Max: 98.5 (23 May 2025 04:54)  HR: 78 (23 May 2025 13:16) (78 - 84)  BP: 138/65 (23 May 2025 13:16) (130/76 - 138/65)  BP(mean): 90 (23 May 2025 13:16) (63 - 94)  RR: 18 (23 May 2025 13:16) (18 - 18)  SpO2: 99% (23 May 2025 13:16) (95% - 99%)    Parameters below as of 23 May 2025 13:16  Patient On (Oxygen Delivery Method): room air      CAPILLARY BLOOD GLUCOSE      POCT Blood Glucose.: 179 mg/dL (23 May 2025 11:10)  POCT Blood Glucose.: 175 mg/dL (23 May 2025 07:28)  POCT Blood Glucose.: 170 mg/dL (22 May 2025 21:44)                          8.6    3.91  )-----------( 153      ( 23 May 2025 06:23 )             25.9     05-23    141  |  106  |  33[H]  ----------------------------<  147[H]  4.9   |  26  |  2.1[H]    Ca    9.5      23 May 2025 06:23  Phos  3.0     05-22  Mg     1.7     05-23    TPro  5.7[L]  /  Alb  3.4[L]  /  TBili  0.2  /  DBili  x   /  AST  36  /  ALT  30  /  AlkPhos  147[H]  05-22    LIVER FUNCTIONS - ( 22 May 2025 05:57 )  Alb: 3.4 g/dL / Pro: 5.7 g/dL / ALK PHOS: 147 U/L / ALT: 30 U/L / AST: 36 U/L / GGT: x                 Urinalysis Basic - ( 23 May 2025 06:23 )    Color: x / Appearance: x / SG: x / pH: x  Gluc: 147 mg/dL / Ketone: x  / Bili: x / Urobili: x   Blood: x / Protein: x / Nitrite: x   Leuk Esterase: x / RBC: x / WBC x   Sq Epi: x / Non Sq Epi: x / Bacteria: x              Chart, Consultant(s) Notes Reviewed:  [x ] YES  [ ] NO  Care Discussed with Consultants/Other Providers/ Housestaff [ x] YES  [ ] NO  Radiology, labs, old available records personally reviewed.    < from: MR Angio Neck w/wo IV Cont (05.12.25 @ 16:25) >  Redemonstrated focal severe stenosis in the left M1 MCA, and P1/P2   junction of the left PCA.    Short segment mildstenosis in the right A2 DIVINE is again noted.    Incidental subcentimeter diverticulum in the left jugular bulb.    MRA NOVA flow rates as above.    < end of copied text >

## 2025-05-23 NOTE — DISCHARGE NOTE NURSING/CASE MANAGEMENT/SOCIAL WORK - PATIENT PORTAL LINK FT
You can access the FollowMyHealth Patient Portal offered by [facility] by registering at the following website: [email or web address]. By joining [facility]’s FollowMyHealth portal, you will also be able to view your health information using other applications (apps) compatible with our system.

## 2025-05-24 LAB
FOLATE SERPL-MCNC: 10 NG/ML — SIGNIFICANT CHANGE UP
VIT B12 SERPL-MCNC: 462 PG/ML — SIGNIFICANT CHANGE UP (ref 232–1245)

## 2025-05-27 ENCOUNTER — NON-APPOINTMENT (OUTPATIENT)
Age: 75
End: 2025-05-27

## 2025-05-29 ENCOUNTER — APPOINTMENT (OUTPATIENT)
Dept: CARDIOLOGY | Facility: CLINIC | Age: 75
End: 2025-05-29
Payer: MEDICARE

## 2025-05-29 ENCOUNTER — NON-APPOINTMENT (OUTPATIENT)
Age: 75
End: 2025-05-29

## 2025-05-29 PROBLEM — E11.9 TYPE 2 DIABETES MELLITUS WITHOUT COMPLICATIONS: Chronic | Status: ACTIVE | Noted: 2025-05-12

## 2025-05-29 PROCEDURE — 93298 REM INTERROG DEV EVAL SCRMS: CPT

## 2025-05-30 ENCOUNTER — APPOINTMENT (OUTPATIENT)
Dept: NEUROSURGERY | Facility: CLINIC | Age: 75
End: 2025-05-30

## 2025-05-30 VITALS
SYSTOLIC BLOOD PRESSURE: 141 MMHG | DIASTOLIC BLOOD PRESSURE: 60 MMHG | BODY MASS INDEX: 38.83 KG/M2 | HEIGHT: 62 IN | WEIGHT: 211 LBS

## 2025-05-30 DIAGNOSIS — Z86.79 OTHER SPECIFIED POSTPROCEDURAL STATES: ICD-10-CM

## 2025-05-30 DIAGNOSIS — Z09 ENCOUNTER FOR FOLLOW-UP EXAMINATION AFTER COMPLETED TREATMENT FOR CONDITIONS OTHER THAN MALIGNANT NEOPLASM: ICD-10-CM

## 2025-05-30 DIAGNOSIS — Z98.890 OTHER SPECIFIED POSTPROCEDURAL STATES: ICD-10-CM

## 2025-05-30 DIAGNOSIS — I66.02 OCCLUSION AND STENOSIS OF LEFT MIDDLE CEREBRAL ARTERY: ICD-10-CM

## 2025-05-30 PROCEDURE — 99213 OFFICE O/P EST LOW 20 MIN: CPT

## 2025-05-30 RX ORDER — AMLODIPINE BESYLATE 5 MG/1
TABLET ORAL
Refills: 0 | Status: ACTIVE | COMMUNITY

## 2025-05-30 RX ORDER — PANTOPRAZOLE 40 MG/1
TABLET, DELAYED RELEASE ORAL
Refills: 0 | Status: ACTIVE | COMMUNITY

## 2025-05-30 NOTE — ASU PATIENT PROFILE, ADULT - TEACHING/LEARNING FACTORS INFLUENCE READINESS TO LEARN
Orders:    B-Type Natriuretic Peptide; Future    Comprehensive Metabolic Panel; Future    TSH with reflex to Free T4 if abnormal; Future    
  Orders:    TSH with reflex to Free T4 if abnormal; Future    
none

## 2025-06-05 DIAGNOSIS — M19.90 UNSPECIFIED OSTEOARTHRITIS, UNSPECIFIED SITE: ICD-10-CM

## 2025-06-05 DIAGNOSIS — Z79.890 HORMONE REPLACEMENT THERAPY: ICD-10-CM

## 2025-06-05 DIAGNOSIS — I66.02 OCCLUSION AND STENOSIS OF LEFT MIDDLE CEREBRAL ARTERY: ICD-10-CM

## 2025-06-05 DIAGNOSIS — N17.9 ACUTE KIDNEY FAILURE, UNSPECIFIED: ICD-10-CM

## 2025-06-05 DIAGNOSIS — I12.9 HYPERTENSIVE CHRONIC KIDNEY DISEASE WITH STAGE 1 THROUGH STAGE 4 CHRONIC KIDNEY DISEASE, OR UNSPECIFIED CHRONIC KIDNEY DISEASE: ICD-10-CM

## 2025-06-05 DIAGNOSIS — N18.4 CHRONIC KIDNEY DISEASE, STAGE 4 (SEVERE): ICD-10-CM

## 2025-06-05 DIAGNOSIS — Z87.891 PERSONAL HISTORY OF NICOTINE DEPENDENCE: ICD-10-CM

## 2025-06-05 DIAGNOSIS — M81.0 AGE-RELATED OSTEOPOROSIS WITHOUT CURRENT PATHOLOGICAL FRACTURE: ICD-10-CM

## 2025-06-05 DIAGNOSIS — D63.1 ANEMIA IN CHRONIC KIDNEY DISEASE: ICD-10-CM

## 2025-06-05 DIAGNOSIS — E83.42 HYPOMAGNESEMIA: ICD-10-CM

## 2025-06-05 DIAGNOSIS — K21.9 GASTRO-ESOPHAGEAL REFLUX DISEASE WITHOUT ESOPHAGITIS: ICD-10-CM

## 2025-06-05 DIAGNOSIS — Z88.1 ALLERGY STATUS TO OTHER ANTIBIOTIC AGENTS: ICD-10-CM

## 2025-06-05 DIAGNOSIS — Z79.02 LONG TERM (CURRENT) USE OF ANTITHROMBOTICS/ANTIPLATELETS: ICD-10-CM

## 2025-06-05 DIAGNOSIS — E03.9 HYPOTHYROIDISM, UNSPECIFIED: ICD-10-CM

## 2025-06-05 DIAGNOSIS — Z95.818 PRESENCE OF OTHER CARDIAC IMPLANTS AND GRAFTS: ICD-10-CM

## 2025-06-05 DIAGNOSIS — G47.33 OBSTRUCTIVE SLEEP APNEA (ADULT) (PEDIATRIC): ICD-10-CM

## 2025-06-05 DIAGNOSIS — D72.819 DECREASED WHITE BLOOD CELL COUNT, UNSPECIFIED: ICD-10-CM

## 2025-06-05 DIAGNOSIS — Z79.82 LONG TERM (CURRENT) USE OF ASPIRIN: ICD-10-CM

## 2025-06-05 DIAGNOSIS — Z91.199 PATIENT'S NONCOMPLIANCE WITH OTHER MEDICAL TREATMENT AND REGIMEN DUE TO UNSPECIFIED REASON: ICD-10-CM

## 2025-06-05 DIAGNOSIS — Z79.4 LONG TERM (CURRENT) USE OF INSULIN: ICD-10-CM

## 2025-06-05 DIAGNOSIS — E11.22 TYPE 2 DIABETES MELLITUS WITH DIABETIC CHRONIC KIDNEY DISEASE: ICD-10-CM

## 2025-06-05 DIAGNOSIS — I08.0 RHEUMATIC DISORDERS OF BOTH MITRAL AND AORTIC VALVES: ICD-10-CM

## 2025-06-05 DIAGNOSIS — Z86.73 PERSONAL HISTORY OF TRANSIENT ISCHEMIC ATTACK (TIA), AND CEREBRAL INFARCTION WITHOUT RESIDUAL DEFICITS: ICD-10-CM

## 2025-07-01 ENCOUNTER — APPOINTMENT (OUTPATIENT)
Dept: CARDIOLOGY | Facility: CLINIC | Age: 75
End: 2025-07-01
Payer: MEDICARE

## 2025-07-01 ENCOUNTER — NON-APPOINTMENT (OUTPATIENT)
Age: 75
End: 2025-07-01

## 2025-07-01 PROCEDURE — 93298 REM INTERROG DEV EVAL SCRMS: CPT

## 2025-07-17 ENCOUNTER — APPOINTMENT (OUTPATIENT)
Dept: NEUROLOGY | Facility: CLINIC | Age: 75
End: 2025-07-17
Payer: MEDICARE

## 2025-07-17 VITALS
OXYGEN SATURATION: 99 % | BODY MASS INDEX: 39.56 KG/M2 | HEIGHT: 62 IN | WEIGHT: 215 LBS | RESPIRATION RATE: 20 BRPM | SYSTOLIC BLOOD PRESSURE: 120 MMHG | DIASTOLIC BLOOD PRESSURE: 76 MMHG | HEART RATE: 82 BPM

## 2025-07-17 PROCEDURE — G2211 COMPLEX E/M VISIT ADD ON: CPT

## 2025-07-17 PROCEDURE — 99214 OFFICE O/P EST MOD 30 MIN: CPT

## 2025-07-17 RX ORDER — LEVOTHYROXINE SODIUM 0.12 MG/1
125 TABLET ORAL DAILY
Refills: 0 | Status: ACTIVE | COMMUNITY

## 2025-07-17 RX ORDER — INSULIN LISPRO 100 [IU]/ML
(75-25) 100 INJECTION, SUSPENSION SUBCUTANEOUS
Refills: 0 | Status: ACTIVE | COMMUNITY

## 2025-07-18 ENCOUNTER — APPOINTMENT (OUTPATIENT)
Dept: CARDIOLOGY | Facility: CLINIC | Age: 75
End: 2025-07-18
Payer: MEDICARE

## 2025-07-18 VITALS
SYSTOLIC BLOOD PRESSURE: 132 MMHG | HEIGHT: 62 IN | OXYGEN SATURATION: 96 % | WEIGHT: 215 LBS | DIASTOLIC BLOOD PRESSURE: 66 MMHG | BODY MASS INDEX: 39.56 KG/M2 | HEART RATE: 89 BPM

## 2025-07-18 PROBLEM — M79.89 SWELLING OF LOWER EXTREMITY: Status: ACTIVE | Noted: 2025-07-18

## 2025-07-18 PROCEDURE — 99214 OFFICE O/P EST MOD 30 MIN: CPT | Mod: 25

## 2025-07-18 PROCEDURE — 93000 ELECTROCARDIOGRAM COMPLETE: CPT

## 2025-07-18 RX ORDER — CARVEDILOL 3.12 MG/1
3.12 TABLET, FILM COATED ORAL TWICE DAILY
Qty: 60 | Refills: 2 | Status: ACTIVE | COMMUNITY
Start: 2025-07-18 | End: 1900-01-01

## 2025-07-18 RX ORDER — AMLODIPINE BESYLATE 5 MG/1
5 TABLET ORAL DAILY
Refills: 0 | Status: DISCONTINUED | COMMUNITY
End: 2025-07-18

## 2025-07-24 ENCOUNTER — APPOINTMENT (OUTPATIENT)
Dept: CARDIOLOGY | Facility: CLINIC | Age: 75
End: 2025-07-24
Payer: MEDICARE

## 2025-07-24 VITALS — SYSTOLIC BLOOD PRESSURE: 120 MMHG | DIASTOLIC BLOOD PRESSURE: 70 MMHG

## 2025-07-24 VITALS
SYSTOLIC BLOOD PRESSURE: 99 MMHG | WEIGHT: 207 LBS | HEART RATE: 72 BPM | DIASTOLIC BLOOD PRESSURE: 44 MMHG | HEIGHT: 62 IN | OXYGEN SATURATION: 97 % | BODY MASS INDEX: 38.09 KG/M2

## 2025-07-24 PROCEDURE — 99214 OFFICE O/P EST MOD 30 MIN: CPT | Mod: 25

## 2025-07-24 PROCEDURE — 93000 ELECTROCARDIOGRAM COMPLETE: CPT

## 2025-07-29 ENCOUNTER — APPOINTMENT (OUTPATIENT)
Dept: CV DIAGNOSITCS | Facility: HOSPITAL | Age: 75
End: 2025-07-29
Payer: MEDICARE

## 2025-07-29 ENCOUNTER — RESULT REVIEW (OUTPATIENT)
Age: 75
End: 2025-07-29

## 2025-07-29 ENCOUNTER — OUTPATIENT (OUTPATIENT)
Dept: OUTPATIENT SERVICES | Facility: HOSPITAL | Age: 75
LOS: 1 days | End: 2025-07-29
Payer: MEDICARE

## 2025-07-29 DIAGNOSIS — Z96.659 PRESENCE OF UNSPECIFIED ARTIFICIAL KNEE JOINT: Chronic | ICD-10-CM

## 2025-07-29 DIAGNOSIS — R06.00 DYSPNEA, UNSPECIFIED: ICD-10-CM

## 2025-07-29 DIAGNOSIS — I10 ESSENTIAL (PRIMARY) HYPERTENSION: ICD-10-CM

## 2025-07-29 DIAGNOSIS — Z98.890 OTHER SPECIFIED POSTPROCEDURAL STATES: Chronic | ICD-10-CM

## 2025-07-29 PROCEDURE — 93306 TTE W/DOPPLER COMPLETE: CPT

## 2025-07-29 PROCEDURE — 93306 TTE W/DOPPLER COMPLETE: CPT | Mod: 26

## 2025-07-30 DIAGNOSIS — R06.00 DYSPNEA, UNSPECIFIED: ICD-10-CM

## 2025-08-05 ENCOUNTER — APPOINTMENT (OUTPATIENT)
Dept: CARDIOLOGY | Facility: CLINIC | Age: 75
End: 2025-08-05
Payer: MEDICARE

## 2025-08-05 ENCOUNTER — NON-APPOINTMENT (OUTPATIENT)
Age: 75
End: 2025-08-05

## 2025-08-05 PROCEDURE — 93298 REM INTERROG DEV EVAL SCRMS: CPT

## 2025-08-12 ENCOUNTER — APPOINTMENT (OUTPATIENT)
Dept: CARDIOLOGY | Facility: CLINIC | Age: 75
End: 2025-08-12
Payer: MEDICARE

## 2025-08-12 ENCOUNTER — APPOINTMENT (OUTPATIENT)
Dept: CARDIOLOGY | Facility: CLINIC | Age: 75
End: 2025-08-12

## 2025-08-12 ENCOUNTER — INPATIENT (INPATIENT)
Facility: HOSPITAL | Age: 75
LOS: 0 days | Discharge: HOME | DRG: 641 | End: 2025-08-13
Attending: STUDENT IN AN ORGANIZED HEALTH CARE EDUCATION/TRAINING PROGRAM | Admitting: INTERNAL MEDICINE
Payer: MEDICARE

## 2025-08-12 VITALS
HEIGHT: 62 IN | HEART RATE: 79 BPM | WEIGHT: 210.1 LBS | RESPIRATION RATE: 16 BRPM | OXYGEN SATURATION: 95 % | SYSTOLIC BLOOD PRESSURE: 140 MMHG | DIASTOLIC BLOOD PRESSURE: 78 MMHG | TEMPERATURE: 98 F

## 2025-08-12 DIAGNOSIS — Z96.659 PRESENCE OF UNSPECIFIED ARTIFICIAL KNEE JOINT: Chronic | ICD-10-CM

## 2025-08-12 DIAGNOSIS — Z98.890 OTHER SPECIFIED POSTPROCEDURAL STATES: Chronic | ICD-10-CM

## 2025-08-12 DIAGNOSIS — E16.2 HYPOGLYCEMIA, UNSPECIFIED: ICD-10-CM

## 2025-08-12 DIAGNOSIS — Z87.39 PERSONAL HISTORY OF OTHER DISEASES OF THE MUSCULOSKELETAL SYSTEM AND CONNECTIVE TISSUE: Chronic | ICD-10-CM

## 2025-08-12 LAB
ALBUMIN SERPL ELPH-MCNC: 4 G/DL — SIGNIFICANT CHANGE UP (ref 3.5–5.2)
ALP SERPL-CCNC: 149 U/L — HIGH (ref 30–115)
ALT FLD-CCNC: 31 U/L — SIGNIFICANT CHANGE UP (ref 0–41)
ANION GAP SERPL CALC-SCNC: 12 MMOL/L — SIGNIFICANT CHANGE UP (ref 7–14)
AST SERPL-CCNC: 36 U/L — SIGNIFICANT CHANGE UP (ref 0–41)
BASOPHILS # BLD AUTO: 0 K/UL — SIGNIFICANT CHANGE UP (ref 0–0.2)
BASOPHILS NFR BLD AUTO: 0 % — SIGNIFICANT CHANGE UP (ref 0–1)
BILIRUB SERPL-MCNC: 0.4 MG/DL — SIGNIFICANT CHANGE UP (ref 0.2–1.2)
BUN SERPL-MCNC: 49 MG/DL — HIGH (ref 10–20)
CALCIUM SERPL-MCNC: 9.8 MG/DL — SIGNIFICANT CHANGE UP (ref 8.4–10.5)
CHLORIDE SERPL-SCNC: 105 MMOL/L — SIGNIFICANT CHANGE UP (ref 98–110)
CO2 SERPL-SCNC: 25 MMOL/L — SIGNIFICANT CHANGE UP (ref 17–32)
CREAT SERPL-MCNC: 2.2 MG/DL — HIGH (ref 0.7–1.5)
EGFR: 23 ML/MIN/1.73M2 — LOW
EGFR: 23 ML/MIN/1.73M2 — LOW
EOSINOPHIL # BLD AUTO: 0.03 K/UL — SIGNIFICANT CHANGE UP (ref 0–0.7)
EOSINOPHIL NFR BLD AUTO: 0.9 % — SIGNIFICANT CHANGE UP (ref 0–8)
GAS PNL BLDV: SIGNIFICANT CHANGE UP
GLUCOSE SERPL-MCNC: 60 MG/DL — LOW (ref 70–99)
HCT VFR BLD CALC: 26.9 % — LOW (ref 37–47)
HGB BLD-MCNC: 9 G/DL — LOW (ref 12–16)
LYMPHOCYTES # BLD AUTO: 1.24 K/UL — SIGNIFICANT CHANGE UP (ref 1.2–3.4)
LYMPHOCYTES # BLD AUTO: 33.3 % — SIGNIFICANT CHANGE UP (ref 20.5–51.1)
MCHC RBC-ENTMCNC: 33.5 G/DL — SIGNIFICANT CHANGE UP (ref 32–37)
MCHC RBC-ENTMCNC: 36.6 PG — HIGH (ref 27–31)
MCV RBC AUTO: 109.3 FL — HIGH (ref 81–99)
MONOCYTES # BLD AUTO: 0.47 K/UL — SIGNIFICANT CHANGE UP (ref 0.1–0.6)
MONOCYTES NFR BLD AUTO: 12.6 % — HIGH (ref 1.7–9.3)
NEUTROPHILS # BLD AUTO: 1.71 K/UL — SIGNIFICANT CHANGE UP (ref 1.4–6.5)
NEUTROPHILS NFR BLD AUTO: 46 % — SIGNIFICANT CHANGE UP (ref 42.2–75.2)
PLATELET # BLD AUTO: 197 K/UL — SIGNIFICANT CHANGE UP (ref 130–400)
PMV BLD: 9.1 FL — SIGNIFICANT CHANGE UP (ref 7.4–10.4)
POTASSIUM SERPL-MCNC: 4.4 MMOL/L — SIGNIFICANT CHANGE UP (ref 3.5–5)
POTASSIUM SERPL-SCNC: 4.4 MMOL/L — SIGNIFICANT CHANGE UP (ref 3.5–5)
PROT SERPL-MCNC: 6.1 G/DL — SIGNIFICANT CHANGE UP (ref 6–8)
RBC # BLD: 2.46 M/UL — LOW (ref 4.2–5.4)
RBC # FLD: 15.9 % — HIGH (ref 11.5–14.5)
SODIUM SERPL-SCNC: 142 MMOL/L — SIGNIFICANT CHANGE UP (ref 135–146)
WBC # BLD: 3.72 K/UL — LOW (ref 4.8–10.8)
WBC # FLD AUTO: 3.72 K/UL — LOW (ref 4.8–10.8)

## 2025-08-12 PROCEDURE — 71045 X-RAY EXAM CHEST 1 VIEW: CPT | Mod: 26

## 2025-08-12 PROCEDURE — 80053 COMPREHEN METABOLIC PANEL: CPT

## 2025-08-12 PROCEDURE — 93970 EXTREMITY STUDY: CPT | Mod: 26

## 2025-08-12 PROCEDURE — 85027 COMPLETE CBC AUTOMATED: CPT

## 2025-08-12 PROCEDURE — 82962 GLUCOSE BLOOD TEST: CPT

## 2025-08-12 PROCEDURE — 36415 COLL VENOUS BLD VENIPUNCTURE: CPT

## 2025-08-12 PROCEDURE — 93000 ELECTROCARDIOGRAM COMPLETE: CPT

## 2025-08-12 PROCEDURE — 93970 EXTREMITY STUDY: CPT

## 2025-08-12 PROCEDURE — 99285 EMERGENCY DEPT VISIT HI MDM: CPT

## 2025-08-12 PROCEDURE — 99215 OFFICE O/P EST HI 40 MIN: CPT | Mod: 25

## 2025-08-13 ENCOUNTER — TRANSCRIPTION ENCOUNTER (OUTPATIENT)
Age: 75
End: 2025-08-13

## 2025-08-13 VITALS
SYSTOLIC BLOOD PRESSURE: 142 MMHG | RESPIRATION RATE: 17 BRPM | DIASTOLIC BLOOD PRESSURE: 87 MMHG | OXYGEN SATURATION: 97 % | HEART RATE: 86 BPM | TEMPERATURE: 98 F

## 2025-08-13 DIAGNOSIS — R09.89 OTHER SPECIFIED SYMPTOMS AND SIGNS INVOLVING THE CIRCULATORY AND RESPIRATORY SYSTEMS: ICD-10-CM

## 2025-08-13 LAB
ALBUMIN SERPL ELPH-MCNC: 4 G/DL — SIGNIFICANT CHANGE UP (ref 3.5–5.2)
ALP SERPL-CCNC: 147 U/L — HIGH (ref 30–115)
ALT FLD-CCNC: 28 U/L — SIGNIFICANT CHANGE UP (ref 0–41)
ANION GAP SERPL CALC-SCNC: 9 MMOL/L — SIGNIFICANT CHANGE UP (ref 7–14)
AST SERPL-CCNC: 34 U/L — SIGNIFICANT CHANGE UP (ref 0–41)
BILIRUB SERPL-MCNC: 0.4 MG/DL — SIGNIFICANT CHANGE UP (ref 0.2–1.2)
BUN SERPL-MCNC: 43 MG/DL — HIGH (ref 10–20)
CALCIUM SERPL-MCNC: 9.8 MG/DL — SIGNIFICANT CHANGE UP (ref 8.4–10.5)
CHLORIDE SERPL-SCNC: 106 MMOL/L — SIGNIFICANT CHANGE UP (ref 98–110)
CO2 SERPL-SCNC: 25 MMOL/L — SIGNIFICANT CHANGE UP (ref 17–32)
CREAT SERPL-MCNC: 2.2 MG/DL — HIGH (ref 0.7–1.5)
EGFR: 23 ML/MIN/1.73M2 — LOW
EGFR: 23 ML/MIN/1.73M2 — LOW
GLUCOSE BLDC GLUCOMTR-MCNC: 113 MG/DL — HIGH (ref 70–99)
GLUCOSE BLDC GLUCOMTR-MCNC: 213 MG/DL — HIGH (ref 70–99)
GLUCOSE BLDC GLUCOMTR-MCNC: 320 MG/DL — HIGH (ref 70–99)
GLUCOSE SERPL-MCNC: 216 MG/DL — HIGH (ref 70–99)
HCT VFR BLD CALC: 29.3 % — LOW (ref 37–47)
HGB BLD-MCNC: 9.7 G/DL — LOW (ref 12–16)
MCHC RBC-ENTMCNC: 33.1 G/DL — SIGNIFICANT CHANGE UP (ref 32–37)
MCHC RBC-ENTMCNC: 36.3 PG — HIGH (ref 27–31)
MCV RBC AUTO: 109.7 FL — HIGH (ref 81–99)
NRBC BLD AUTO-RTO: 0 /100 WBCS — SIGNIFICANT CHANGE UP (ref 0–0)
PLATELET # BLD AUTO: 184 K/UL — SIGNIFICANT CHANGE UP (ref 130–400)
PMV BLD: 8.9 FL — SIGNIFICANT CHANGE UP (ref 7.4–10.4)
POTASSIUM SERPL-MCNC: 5 MMOL/L — SIGNIFICANT CHANGE UP (ref 3.5–5)
POTASSIUM SERPL-SCNC: 5 MMOL/L — SIGNIFICANT CHANGE UP (ref 3.5–5)
PROT SERPL-MCNC: 6.3 G/DL — SIGNIFICANT CHANGE UP (ref 6–8)
RBC # BLD: 2.67 M/UL — LOW (ref 4.2–5.4)
RBC # FLD: 15.9 % — HIGH (ref 11.5–14.5)
SODIUM SERPL-SCNC: 140 MMOL/L — SIGNIFICANT CHANGE UP (ref 135–146)
WBC # BLD: 4.22 K/UL — LOW (ref 4.8–10.8)
WBC # FLD AUTO: 4.22 K/UL — LOW (ref 4.8–10.8)

## 2025-08-13 PROCEDURE — 99223 1ST HOSP IP/OBS HIGH 75: CPT

## 2025-08-13 PROCEDURE — 99238 HOSP IP/OBS DSCHRG MGMT 30/<: CPT

## 2025-08-13 RX ORDER — DULOXETINE 20 MG/1
1 CAPSULE, DELAYED RELEASE ORAL
Refills: 0 | DISCHARGE

## 2025-08-13 RX ORDER — BUPROPION HYDROBROMIDE 522 MG/1
1 TABLET, EXTENDED RELEASE ORAL
Refills: 0 | DISCHARGE

## 2025-08-13 RX ORDER — LISINOPRIL 5 MG/1
1 TABLET ORAL
Refills: 0 | DISCHARGE

## 2025-08-13 RX ORDER — SODIUM BICARBONATE 1 MEQ/ML
650 SYRINGE (ML) INTRAVENOUS
Refills: 0 | Status: DISCONTINUED | OUTPATIENT
Start: 2025-08-13 | End: 2025-08-13

## 2025-08-13 RX ORDER — ASPIRIN 325 MG
1 TABLET ORAL
Qty: 0 | Refills: 0 | DISCHARGE
Start: 2025-08-13

## 2025-08-13 RX ORDER — FINASTERIDE 1 MG/1
1 TABLET, FILM COATED ORAL
Refills: 0 | DISCHARGE

## 2025-08-13 RX ORDER — TICAGRELOR 90 MG/1
1 TABLET ORAL
Refills: 0 | DISCHARGE

## 2025-08-13 RX ORDER — GABAPENTIN 400 MG/1
1 CAPSULE ORAL
Refills: 0 | DISCHARGE

## 2025-08-13 RX ORDER — LISINOPRIL 5 MG/1
10 TABLET ORAL DAILY
Refills: 0 | Status: DISCONTINUED | OUTPATIENT
Start: 2025-08-13 | End: 2025-08-13

## 2025-08-13 RX ORDER — GABAPENTIN 400 MG/1
400 CAPSULE ORAL
Refills: 0 | Status: DISCONTINUED | OUTPATIENT
Start: 2025-08-13 | End: 2025-08-13

## 2025-08-13 RX ORDER — INSULIN LISPRO 100 U/ML
100 INJECTION, SOLUTION INTRAVENOUS; SUBCUTANEOUS
Refills: 0 | DISCHARGE

## 2025-08-13 RX ORDER — ASPIRIN 325 MG
81 TABLET ORAL DAILY
Refills: 0 | Status: DISCONTINUED | OUTPATIENT
Start: 2025-08-13 | End: 2025-08-13

## 2025-08-13 RX ORDER — ACETAMINOPHEN 500 MG/5ML
650 LIQUID (ML) ORAL ONCE
Refills: 0 | Status: COMPLETED | OUTPATIENT
Start: 2025-08-13 | End: 2025-08-13

## 2025-08-13 RX ORDER — LEVOTHYROXINE SODIUM 300 MCG
1 TABLET ORAL
Refills: 0 | DISCHARGE

## 2025-08-13 RX ORDER — AMLODIPINE BESYLATE 10 MG/1
1 TABLET ORAL
Refills: 0 | DISCHARGE

## 2025-08-13 RX ORDER — FINASTERIDE 1 MG/1
5 TABLET, FILM COATED ORAL DAILY
Refills: 0 | Status: DISCONTINUED | OUTPATIENT
Start: 2025-08-13 | End: 2025-08-13

## 2025-08-13 RX ORDER — SODIUM BICARBONATE 1 MEQ/ML
650 SYRINGE (ML) INTRAVENOUS
Refills: 0 | DISCHARGE

## 2025-08-13 RX ORDER — DULOXETINE 20 MG/1
60 CAPSULE, DELAYED RELEASE ORAL DAILY
Refills: 0 | Status: DISCONTINUED | OUTPATIENT
Start: 2025-08-13 | End: 2025-08-13

## 2025-08-13 RX ORDER — ROSUVASTATIN CALCIUM 20 MG/1
1 TABLET, FILM COATED ORAL
Refills: 0 | DISCHARGE

## 2025-08-13 RX ORDER — ROSUVASTATIN CALCIUM 20 MG/1
40 TABLET, FILM COATED ORAL AT BEDTIME
Refills: 0 | Status: DISCONTINUED | OUTPATIENT
Start: 2025-08-13 | End: 2025-08-13

## 2025-08-13 RX ADMIN — Medication 650 MILLIGRAM(S): at 14:01

## 2025-08-13 RX ADMIN — Medication 1 APPLICATION(S): at 12:53

## 2025-08-13 RX ADMIN — Medication 650 MILLIGRAM(S): at 14:40

## 2025-08-13 RX ADMIN — Medication 81 MILLIGRAM(S): at 12:53

## 2025-08-13 RX ADMIN — DULOXETINE 60 MILLIGRAM(S): 20 CAPSULE, DELAYED RELEASE ORAL at 12:53

## 2025-08-13 RX ADMIN — FINASTERIDE 5 MILLIGRAM(S): 1 TABLET, FILM COATED ORAL at 12:52

## 2025-08-18 ENCOUNTER — APPOINTMENT (OUTPATIENT)
Dept: CARDIOLOGY | Facility: CLINIC | Age: 75
End: 2025-08-18

## 2025-08-20 DIAGNOSIS — E11.649 TYPE 2 DIABETES MELLITUS WITH HYPOGLYCEMIA WITHOUT COMA: ICD-10-CM

## 2025-08-20 DIAGNOSIS — I12.9 HYPERTENSIVE CHRONIC KIDNEY DISEASE WITH STAGE 1 THROUGH STAGE 4 CHRONIC KIDNEY DISEASE, OR UNSPECIFIED CHRONIC KIDNEY DISEASE: ICD-10-CM

## 2025-08-20 DIAGNOSIS — E78.5 HYPERLIPIDEMIA, UNSPECIFIED: ICD-10-CM

## 2025-08-20 DIAGNOSIS — Z79.82 LONG TERM (CURRENT) USE OF ASPIRIN: ICD-10-CM

## 2025-08-20 DIAGNOSIS — E11.22 TYPE 2 DIABETES MELLITUS WITH DIABETIC CHRONIC KIDNEY DISEASE: ICD-10-CM

## 2025-08-20 DIAGNOSIS — Z86.73 PERSONAL HISTORY OF TRANSIENT ISCHEMIC ATTACK (TIA), AND CEREBRAL INFARCTION WITHOUT RESIDUAL DEFICITS: ICD-10-CM

## 2025-08-20 DIAGNOSIS — Z28.81: ICD-10-CM

## 2025-08-27 ENCOUNTER — OUTPATIENT (OUTPATIENT)
Dept: OUTPATIENT SERVICES | Facility: HOSPITAL | Age: 75
LOS: 1 days | End: 2025-08-27
Payer: MEDICARE

## 2025-08-27 ENCOUNTER — RESULT REVIEW (OUTPATIENT)
Age: 75
End: 2025-08-27

## 2025-08-27 DIAGNOSIS — Z98.890 OTHER SPECIFIED POSTPROCEDURAL STATES: Chronic | ICD-10-CM

## 2025-08-27 DIAGNOSIS — Z98.890 OTHER SPECIFIED POSTPROCEDURAL STATES: ICD-10-CM

## 2025-08-27 DIAGNOSIS — Z87.39 PERSONAL HISTORY OF OTHER DISEASES OF THE MUSCULOSKELETAL SYSTEM AND CONNECTIVE TISSUE: Chronic | ICD-10-CM

## 2025-08-27 PROCEDURE — 70496 CT ANGIOGRAPHY HEAD: CPT | Mod: 26

## 2025-08-27 PROCEDURE — 70498 CT ANGIOGRAPHY NECK: CPT

## 2025-08-27 PROCEDURE — 70496 CT ANGIOGRAPHY HEAD: CPT

## 2025-08-27 PROCEDURE — 70498 CT ANGIOGRAPHY NECK: CPT | Mod: 26

## 2025-08-28 DIAGNOSIS — Z98.890 OTHER SPECIFIED POSTPROCEDURAL STATES: ICD-10-CM

## 2025-09-03 ENCOUNTER — APPOINTMENT (OUTPATIENT)
Dept: NEUROSURGERY | Facility: CLINIC | Age: 75
End: 2025-09-03

## 2025-09-03 VITALS
HEART RATE: 86 BPM | HEIGHT: 62 IN | BODY MASS INDEX: 38.64 KG/M2 | OXYGEN SATURATION: 98 % | DIASTOLIC BLOOD PRESSURE: 70 MMHG | WEIGHT: 210 LBS | SYSTOLIC BLOOD PRESSURE: 136 MMHG

## 2025-09-03 DIAGNOSIS — I66.02 OCCLUSION AND STENOSIS OF LEFT MIDDLE CEREBRAL ARTERY: ICD-10-CM

## 2025-09-03 PROCEDURE — 99214 OFFICE O/P EST MOD 30 MIN: CPT

## 2025-09-09 ENCOUNTER — NON-APPOINTMENT (OUTPATIENT)
Age: 75
End: 2025-09-09

## 2025-09-09 ENCOUNTER — APPOINTMENT (OUTPATIENT)
Dept: CARDIOLOGY | Facility: CLINIC | Age: 75
End: 2025-09-09
Payer: MEDICARE

## 2025-09-09 PROCEDURE — 93298 REM INTERROG DEV EVAL SCRMS: CPT
